# Patient Record
Sex: FEMALE | Race: WHITE | NOT HISPANIC OR LATINO | Employment: OTHER | URBAN - METROPOLITAN AREA
[De-identification: names, ages, dates, MRNs, and addresses within clinical notes are randomized per-mention and may not be internally consistent; named-entity substitution may affect disease eponyms.]

---

## 2019-08-16 ENCOUNTER — AMBULATORY - HEALTHEAST (OUTPATIENT)
Dept: ADDICTION MEDICINE | Facility: CLINIC | Age: 44
End: 2019-08-16

## 2019-08-16 ENCOUNTER — OFFICE VISIT - HEALTHEAST (OUTPATIENT)
Dept: ADDICTION MEDICINE | Facility: CLINIC | Age: 44
End: 2019-08-16

## 2019-08-16 DIAGNOSIS — F10.20 MODERATE ALCOHOL USE DISORDER (H): ICD-10-CM

## 2019-08-16 DIAGNOSIS — F12.20 CANNABIS USE DISORDER, SEVERE, DEPENDENCE (H): ICD-10-CM

## 2019-08-19 ENCOUNTER — OFFICE VISIT - HEALTHEAST (OUTPATIENT)
Dept: ADDICTION MEDICINE | Facility: CLINIC | Age: 44
End: 2019-08-19

## 2019-08-19 DIAGNOSIS — F12.20 CANNABIS USE DISORDER, SEVERE, DEPENDENCE (H): ICD-10-CM

## 2019-08-20 ENCOUNTER — AMBULATORY - HEALTHEAST (OUTPATIENT)
Dept: ADDICTION MEDICINE | Facility: CLINIC | Age: 44
End: 2019-08-20

## 2019-08-21 ENCOUNTER — OFFICE VISIT - HEALTHEAST (OUTPATIENT)
Dept: ADDICTION MEDICINE | Facility: CLINIC | Age: 44
End: 2019-08-21

## 2019-08-21 DIAGNOSIS — F12.20 CANNABIS USE DISORDER, SEVERE, DEPENDENCE (H): ICD-10-CM

## 2019-08-22 ENCOUNTER — AMBULATORY - HEALTHEAST (OUTPATIENT)
Dept: ADDICTION MEDICINE | Facility: CLINIC | Age: 44
End: 2019-08-22

## 2019-08-23 ENCOUNTER — OFFICE VISIT - HEALTHEAST (OUTPATIENT)
Dept: ADDICTION MEDICINE | Facility: CLINIC | Age: 44
End: 2019-08-23

## 2019-08-23 DIAGNOSIS — F12.20 CANNABIS USE DISORDER, SEVERE, DEPENDENCE (H): ICD-10-CM

## 2019-08-23 DIAGNOSIS — F10.20 MODERATE ALCOHOL USE DISORDER (H): ICD-10-CM

## 2019-08-26 ENCOUNTER — OFFICE VISIT - HEALTHEAST (OUTPATIENT)
Dept: ADDICTION MEDICINE | Facility: CLINIC | Age: 44
End: 2019-08-26

## 2019-08-26 DIAGNOSIS — F12.20 CANNABIS USE DISORDER, SEVERE, DEPENDENCE (H): ICD-10-CM

## 2019-08-28 ENCOUNTER — OFFICE VISIT - HEALTHEAST (OUTPATIENT)
Dept: BEHAVIORAL HEALTH | Facility: CLINIC | Age: 44
End: 2019-08-28

## 2019-08-28 ENCOUNTER — OFFICE VISIT - HEALTHEAST (OUTPATIENT)
Dept: ADDICTION MEDICINE | Facility: CLINIC | Age: 44
End: 2019-08-28

## 2019-08-28 DIAGNOSIS — F10.20 SEVERE ALCOHOL USE DISORDER (H): ICD-10-CM

## 2019-08-28 DIAGNOSIS — F17.200 TOBACCO USE DISORDER: ICD-10-CM

## 2019-08-28 DIAGNOSIS — F31.9 BIPOLAR I DISORDER (H): ICD-10-CM

## 2019-08-28 DIAGNOSIS — F12.20 CANNABIS USE DISORDER, SEVERE, DEPENDENCE (H): ICD-10-CM

## 2019-08-28 LAB
AMPHETAMINES UR QL SCN: ABNORMAL
BARBITURATES UR QL: ABNORMAL
BENZODIAZ UR QL: ABNORMAL
CANNABINOIDS UR QL SCN: ABNORMAL
COCAINE UR QL: ABNORMAL
CREAT UR-MCNC: 185.2 MG/DL
METHADONE UR QL SCN: ABNORMAL
OPIATES UR QL SCN: ABNORMAL
OXYCODONE UR QL: ABNORMAL
PCP UR QL SCN: ABNORMAL

## 2019-08-28 ASSESSMENT — MIFFLIN-ST. JEOR: SCORE: 1388.39

## 2019-08-30 ENCOUNTER — OFFICE VISIT - HEALTHEAST (OUTPATIENT)
Dept: ADDICTION MEDICINE | Facility: CLINIC | Age: 44
End: 2019-08-30

## 2019-08-30 DIAGNOSIS — F12.20 CANNABIS USE DISORDER, SEVERE, DEPENDENCE (H): ICD-10-CM

## 2019-09-01 LAB
ETHYL GLUCURONIDE UR CFM-MCNC: <100 NG/ML
ETHYL SULFATE UR CFM-MCNC: <100 NG/ML

## 2019-09-04 ENCOUNTER — OFFICE VISIT - HEALTHEAST (OUTPATIENT)
Dept: ADDICTION MEDICINE | Facility: CLINIC | Age: 44
End: 2019-09-04

## 2019-09-04 DIAGNOSIS — F12.20 CANNABIS USE DISORDER, SEVERE, DEPENDENCE (H): ICD-10-CM

## 2019-09-05 ENCOUNTER — AMBULATORY - HEALTHEAST (OUTPATIENT)
Dept: ADDICTION MEDICINE | Facility: CLINIC | Age: 44
End: 2019-09-05

## 2019-09-06 ENCOUNTER — OFFICE VISIT - HEALTHEAST (OUTPATIENT)
Dept: ADDICTION MEDICINE | Facility: CLINIC | Age: 44
End: 2019-09-06

## 2019-09-06 DIAGNOSIS — F12.20 CANNABIS USE DISORDER, SEVERE, DEPENDENCE (H): ICD-10-CM

## 2019-09-09 ENCOUNTER — OFFICE VISIT - HEALTHEAST (OUTPATIENT)
Dept: ADDICTION MEDICINE | Facility: CLINIC | Age: 44
End: 2019-09-09

## 2019-09-09 DIAGNOSIS — F12.20 CANNABIS USE DISORDER, SEVERE, DEPENDENCE (H): ICD-10-CM

## 2019-09-10 ENCOUNTER — OFFICE VISIT - HEALTHEAST (OUTPATIENT)
Dept: BEHAVIORAL HEALTH | Facility: CLINIC | Age: 44
End: 2019-09-10

## 2019-09-10 ENCOUNTER — AMBULATORY - HEALTHEAST (OUTPATIENT)
Dept: ADDICTION MEDICINE | Facility: CLINIC | Age: 44
End: 2019-09-10

## 2019-09-10 DIAGNOSIS — F10.20 SEVERE ALCOHOL USE DISORDER (H): ICD-10-CM

## 2019-09-10 DIAGNOSIS — F60.3 BORDERLINE PERSONALITY DISORDER (H): ICD-10-CM

## 2019-09-10 DIAGNOSIS — F33.1 DEPRESSION, MAJOR, RECURRENT, MODERATE (H): ICD-10-CM

## 2019-09-10 DIAGNOSIS — F12.20 CANNABIS USE DISORDER, SEVERE, DEPENDENCE (H): ICD-10-CM

## 2019-09-10 DIAGNOSIS — F31.9 BIPOLAR I DISORDER (H): ICD-10-CM

## 2019-09-10 DIAGNOSIS — F41.1 GENERALIZED ANXIETY DISORDER: ICD-10-CM

## 2019-09-11 ENCOUNTER — OFFICE VISIT - HEALTHEAST (OUTPATIENT)
Dept: ADDICTION MEDICINE | Facility: CLINIC | Age: 44
End: 2019-09-11

## 2019-09-11 DIAGNOSIS — F12.20 CANNABIS USE DISORDER, SEVERE, DEPENDENCE (H): ICD-10-CM

## 2019-09-12 ENCOUNTER — AMBULATORY - HEALTHEAST (OUTPATIENT)
Dept: ADDICTION MEDICINE | Facility: CLINIC | Age: 44
End: 2019-09-12

## 2019-09-13 ENCOUNTER — OFFICE VISIT - HEALTHEAST (OUTPATIENT)
Dept: ADDICTION MEDICINE | Facility: CLINIC | Age: 44
End: 2019-09-13

## 2019-09-13 DIAGNOSIS — F12.20 CANNABIS USE DISORDER, SEVERE, DEPENDENCE (H): ICD-10-CM

## 2019-09-16 ENCOUNTER — OFFICE VISIT - HEALTHEAST (OUTPATIENT)
Dept: ADDICTION MEDICINE | Facility: CLINIC | Age: 44
End: 2019-09-16

## 2019-09-16 DIAGNOSIS — F12.20 CANNABIS USE DISORDER, SEVERE, DEPENDENCE (H): ICD-10-CM

## 2019-09-18 ENCOUNTER — OFFICE VISIT - HEALTHEAST (OUTPATIENT)
Dept: ADDICTION MEDICINE | Facility: CLINIC | Age: 44
End: 2019-09-18

## 2019-09-18 DIAGNOSIS — F12.20 CANNABIS USE DISORDER, SEVERE, DEPENDENCE (H): ICD-10-CM

## 2019-09-19 ENCOUNTER — AMBULATORY - HEALTHEAST (OUTPATIENT)
Dept: ADDICTION MEDICINE | Facility: CLINIC | Age: 44
End: 2019-09-19

## 2019-09-23 ENCOUNTER — OFFICE VISIT - HEALTHEAST (OUTPATIENT)
Dept: ADDICTION MEDICINE | Facility: CLINIC | Age: 44
End: 2019-09-23

## 2019-09-23 DIAGNOSIS — F12.20 CANNABIS USE DISORDER, SEVERE, DEPENDENCE (H): ICD-10-CM

## 2019-09-24 ENCOUNTER — OFFICE VISIT - HEALTHEAST (OUTPATIENT)
Dept: BEHAVIORAL HEALTH | Facility: CLINIC | Age: 44
End: 2019-09-24

## 2019-09-24 DIAGNOSIS — F31.9 BIPOLAR I DISORDER (H): ICD-10-CM

## 2019-09-24 DIAGNOSIS — F60.3 BORDERLINE PERSONALITY DISORDER (H): ICD-10-CM

## 2019-09-24 DIAGNOSIS — F10.21 ALCOHOL DEPENDENCE IN EARLY, EARLY PARTIAL, SUSTAINED FULL, OR SUSTAINED PARTIAL REMISSION (H): ICD-10-CM

## 2019-09-24 DIAGNOSIS — F12.20 CANNABIS USE DISORDER, SEVERE, DEPENDENCE (H): ICD-10-CM

## 2019-09-24 DIAGNOSIS — F41.1 GENERALIZED ANXIETY DISORDER: ICD-10-CM

## 2019-09-25 ENCOUNTER — OFFICE VISIT - HEALTHEAST (OUTPATIENT)
Dept: ADDICTION MEDICINE | Facility: CLINIC | Age: 44
End: 2019-09-25

## 2019-09-25 ENCOUNTER — OFFICE VISIT - HEALTHEAST (OUTPATIENT)
Dept: BEHAVIORAL HEALTH | Facility: CLINIC | Age: 44
End: 2019-09-25

## 2019-09-25 DIAGNOSIS — F41.1 GENERALIZED ANXIETY DISORDER: ICD-10-CM

## 2019-09-25 DIAGNOSIS — F12.20 CANNABIS USE DISORDER, SEVERE, DEPENDENCE (H): ICD-10-CM

## 2019-09-25 DIAGNOSIS — F60.3 BORDERLINE PERSONALITY DISORDER (H): ICD-10-CM

## 2019-09-25 DIAGNOSIS — F31.32 BIPOLAR AFFECTIVE DISORDER, CURRENTLY DEPRESSED, MODERATE (H): ICD-10-CM

## 2019-09-25 DIAGNOSIS — F10.20 SEVERE ALCOHOL USE DISORDER (H): ICD-10-CM

## 2019-09-25 LAB
AMPHETAMINES UR QL SCN: ABNORMAL
BARBITURATES UR QL: ABNORMAL
BENZODIAZ UR QL: ABNORMAL
CANNABINOIDS UR QL SCN: ABNORMAL
COCAINE UR QL: ABNORMAL
CREAT UR-MCNC: 214.9 MG/DL
METHADONE UR QL SCN: ABNORMAL
OPIATES UR QL SCN: ABNORMAL
OXYCODONE UR QL: ABNORMAL
PCP UR QL SCN: ABNORMAL

## 2019-09-25 RX ORDER — TRAZODONE HYDROCHLORIDE 50 MG/1
50 TABLET, FILM COATED ORAL
Qty: 30 TABLET | Refills: 0 | Status: SHIPPED | OUTPATIENT
Start: 2019-09-25

## 2019-09-25 RX ORDER — LANOLIN ALCOHOL/MO/W.PET/CERES
9 CREAM (GRAM) TOPICAL AT BEDTIME
Qty: 90 TABLET | Refills: 2 | Status: SHIPPED | OUTPATIENT
Start: 2019-09-25

## 2019-09-25 ASSESSMENT — ANXIETY QUESTIONNAIRES
5. BEING SO RESTLESS THAT IT IS HARD TO SIT STILL: NEARLY EVERY DAY
3. WORRYING TOO MUCH ABOUT DIFFERENT THINGS: NEARLY EVERY DAY
1. FEELING NERVOUS, ANXIOUS, OR ON EDGE: NEARLY EVERY DAY
7. FEELING AFRAID AS IF SOMETHING AWFUL MIGHT HAPPEN: NEARLY EVERY DAY
4. TROUBLE RELAXING: NEARLY EVERY DAY
2. NOT BEING ABLE TO STOP OR CONTROL WORRYING: NEARLY EVERY DAY
GAD7 TOTAL SCORE: 21
IF YOU CHECKED OFF ANY PROBLEMS ON THIS QUESTIONNAIRE, HOW DIFFICULT HAVE THESE PROBLEMS MADE IT FOR YOU TO DO YOUR WORK, TAKE CARE OF THINGS AT HOME, OR GET ALONG WITH OTHER PEOPLE: EXTREMELY DIFFICULT
6. BECOMING EASILY ANNOYED OR IRRITABLE: NEARLY EVERY DAY

## 2019-09-25 ASSESSMENT — MIFFLIN-ST. JEOR: SCORE: 1388.39

## 2019-09-25 ASSESSMENT — PATIENT HEALTH QUESTIONNAIRE - PHQ9: SUM OF ALL RESPONSES TO PHQ QUESTIONS 1-9: 18

## 2019-09-26 ENCOUNTER — AMBULATORY - HEALTHEAST (OUTPATIENT)
Dept: ADDICTION MEDICINE | Facility: CLINIC | Age: 44
End: 2019-09-26

## 2019-09-27 ENCOUNTER — OFFICE VISIT - HEALTHEAST (OUTPATIENT)
Dept: ADDICTION MEDICINE | Facility: CLINIC | Age: 44
End: 2019-09-27

## 2019-09-27 DIAGNOSIS — F12.20 CANNABIS USE DISORDER, SEVERE, DEPENDENCE (H): ICD-10-CM

## 2019-09-28 LAB
ETHYL GLUCURONIDE UR CFM-MCNC: <100 NG/ML
ETHYL SULFATE UR CFM-MCNC: <100 NG/ML

## 2019-09-30 ENCOUNTER — OFFICE VISIT - HEALTHEAST (OUTPATIENT)
Dept: ADDICTION MEDICINE | Facility: CLINIC | Age: 44
End: 2019-09-30

## 2019-09-30 DIAGNOSIS — F12.20 CANNABIS USE DISORDER, SEVERE, DEPENDENCE (H): ICD-10-CM

## 2019-10-02 ENCOUNTER — OFFICE VISIT - HEALTHEAST (OUTPATIENT)
Dept: ADDICTION MEDICINE | Facility: CLINIC | Age: 44
End: 2019-10-02

## 2019-10-02 DIAGNOSIS — F12.20 CANNABIS USE DISORDER, SEVERE, DEPENDENCE (H): ICD-10-CM

## 2019-10-04 ENCOUNTER — OFFICE VISIT - HEALTHEAST (OUTPATIENT)
Dept: ADDICTION MEDICINE | Facility: CLINIC | Age: 44
End: 2019-10-04

## 2019-10-04 ENCOUNTER — AMBULATORY - HEALTHEAST (OUTPATIENT)
Dept: ADDICTION MEDICINE | Facility: CLINIC | Age: 44
End: 2019-10-04

## 2019-10-04 DIAGNOSIS — F12.20 CANNABIS USE DISORDER, SEVERE, DEPENDENCE (H): ICD-10-CM

## 2019-10-07 ENCOUNTER — AMBULATORY - HEALTHEAST (OUTPATIENT)
Dept: LAB | Facility: CLINIC | Age: 44
End: 2019-10-07

## 2019-10-07 ENCOUNTER — OFFICE VISIT - HEALTHEAST (OUTPATIENT)
Dept: ADDICTION MEDICINE | Facility: CLINIC | Age: 44
End: 2019-10-07

## 2019-10-07 ENCOUNTER — COMMUNICATION - HEALTHEAST (OUTPATIENT)
Dept: TELEHEALTH | Facility: CLINIC | Age: 44
End: 2019-10-07

## 2019-10-07 DIAGNOSIS — F10.20 MODERATE ALCOHOL USE DISORDER (H): ICD-10-CM

## 2019-10-07 DIAGNOSIS — F12.20 CANNABIS USE DISORDER, SEVERE, DEPENDENCE (H): ICD-10-CM

## 2019-10-07 DIAGNOSIS — F10.20 SEVERE ALCOHOL USE DISORDER (H): ICD-10-CM

## 2019-10-07 LAB
AMPHETAMINES UR QL SCN: NORMAL
BARBITURATES UR QL: NORMAL
BENZODIAZ UR QL: NORMAL
CANNABINOIDS UR QL SCN: NORMAL
COCAINE UR QL: NORMAL
CREAT UR-MCNC: 110.3 MG/DL
ETHANOL UR CFM-MCNC: <10 MG/DL
METHADONE UR QL SCN: NORMAL
OPIATES UR QL SCN: NORMAL
OXYCODONE UR QL: NORMAL
PCP UR QL SCN: NORMAL

## 2019-10-08 ENCOUNTER — AMBULATORY - HEALTHEAST (OUTPATIENT)
Dept: BEHAVIORAL HEALTH | Facility: CLINIC | Age: 44
End: 2019-10-08

## 2019-10-08 ENCOUNTER — OFFICE VISIT - HEALTHEAST (OUTPATIENT)
Dept: BEHAVIORAL HEALTH | Facility: CLINIC | Age: 44
End: 2019-10-08

## 2019-10-08 DIAGNOSIS — F10.21 ALCOHOL DEPENDENCE IN EARLY, EARLY PARTIAL, SUSTAINED FULL, OR SUSTAINED PARTIAL REMISSION (H): ICD-10-CM

## 2019-10-08 DIAGNOSIS — F41.1 GENERALIZED ANXIETY DISORDER: ICD-10-CM

## 2019-10-08 DIAGNOSIS — F60.3 BORDERLINE PERSONALITY DISORDER (H): ICD-10-CM

## 2019-10-08 DIAGNOSIS — F12.21 MODERATE TETRAHYDROCANNABINOL (THC) DEPENDENCE IN EARLY REMISSION (H): ICD-10-CM

## 2019-10-08 DIAGNOSIS — F31.32 BIPOLAR AFFECTIVE DISORDER, CURRENTLY DEPRESSED, MODERATE (H): ICD-10-CM

## 2019-10-09 ENCOUNTER — OFFICE VISIT - HEALTHEAST (OUTPATIENT)
Dept: ADDICTION MEDICINE | Facility: CLINIC | Age: 44
End: 2019-10-09

## 2019-10-09 DIAGNOSIS — F12.20 CANNABIS USE DISORDER, SEVERE, DEPENDENCE (H): ICD-10-CM

## 2019-10-09 LAB
ETHYL GLUCURONIDE UR CFM-MCNC: <100 NG/ML
ETHYL SULFATE UR CFM-MCNC: <100 NG/ML

## 2019-10-10 ENCOUNTER — AMBULATORY - HEALTHEAST (OUTPATIENT)
Dept: ADDICTION MEDICINE | Facility: CLINIC | Age: 44
End: 2019-10-10

## 2019-10-11 ENCOUNTER — OFFICE VISIT - HEALTHEAST (OUTPATIENT)
Dept: ADDICTION MEDICINE | Facility: CLINIC | Age: 44
End: 2019-10-11

## 2019-10-11 DIAGNOSIS — F12.20 CANNABIS USE DISORDER, SEVERE, DEPENDENCE (H): ICD-10-CM

## 2019-10-14 ENCOUNTER — OFFICE VISIT - HEALTHEAST (OUTPATIENT)
Dept: ADDICTION MEDICINE | Facility: CLINIC | Age: 44
End: 2019-10-14

## 2019-10-14 DIAGNOSIS — F12.20 CANNABIS USE DISORDER, SEVERE, DEPENDENCE (H): ICD-10-CM

## 2019-10-17 ENCOUNTER — AMBULATORY - HEALTHEAST (OUTPATIENT)
Dept: ADDICTION MEDICINE | Facility: CLINIC | Age: 44
End: 2019-10-17

## 2019-10-18 ENCOUNTER — OFFICE VISIT - HEALTHEAST (OUTPATIENT)
Dept: ADDICTION MEDICINE | Facility: CLINIC | Age: 44
End: 2019-10-18

## 2019-10-18 DIAGNOSIS — F12.20 CANNABIS USE DISORDER, SEVERE, DEPENDENCE (H): ICD-10-CM

## 2019-10-22 ENCOUNTER — OFFICE VISIT - HEALTHEAST (OUTPATIENT)
Dept: BEHAVIORAL HEALTH | Facility: CLINIC | Age: 44
End: 2019-10-22

## 2019-10-22 DIAGNOSIS — F41.1 GENERALIZED ANXIETY DISORDER: ICD-10-CM

## 2019-10-22 DIAGNOSIS — F12.21 MODERATE TETRAHYDROCANNABINOL (THC) DEPENDENCE IN EARLY REMISSION (H): ICD-10-CM

## 2019-10-22 DIAGNOSIS — F31.32 BIPOLAR AFFECTIVE DISORDER, CURRENTLY DEPRESSED, MODERATE (H): ICD-10-CM

## 2019-10-22 DIAGNOSIS — F10.21 ALCOHOL DEPENDENCE IN EARLY, EARLY PARTIAL, SUSTAINED FULL, OR SUSTAINED PARTIAL REMISSION (H): ICD-10-CM

## 2019-10-22 DIAGNOSIS — F60.3 BORDERLINE PERSONALITY DISORDER (H): ICD-10-CM

## 2019-10-23 ENCOUNTER — OFFICE VISIT - HEALTHEAST (OUTPATIENT)
Dept: ADDICTION MEDICINE | Facility: CLINIC | Age: 44
End: 2019-10-23

## 2019-10-23 DIAGNOSIS — F12.20 CANNABIS USE DISORDER, SEVERE, DEPENDENCE (H): ICD-10-CM

## 2019-10-24 ENCOUNTER — AMBULATORY - HEALTHEAST (OUTPATIENT)
Dept: ADDICTION MEDICINE | Facility: CLINIC | Age: 44
End: 2019-10-24

## 2019-10-25 ENCOUNTER — OFFICE VISIT - HEALTHEAST (OUTPATIENT)
Dept: ADDICTION MEDICINE | Facility: CLINIC | Age: 44
End: 2019-10-25

## 2019-10-25 DIAGNOSIS — F12.20 CANNABIS USE DISORDER, SEVERE, DEPENDENCE (H): ICD-10-CM

## 2019-10-30 ENCOUNTER — OFFICE VISIT - HEALTHEAST (OUTPATIENT)
Dept: ADDICTION MEDICINE | Facility: CLINIC | Age: 44
End: 2019-10-30

## 2019-10-30 DIAGNOSIS — F12.20 CANNABIS USE DISORDER, SEVERE, DEPENDENCE (H): ICD-10-CM

## 2019-10-31 ENCOUNTER — AMBULATORY - HEALTHEAST (OUTPATIENT)
Dept: ADDICTION MEDICINE | Facility: CLINIC | Age: 44
End: 2019-10-31

## 2019-11-05 ENCOUNTER — AMBULATORY - HEALTHEAST (OUTPATIENT)
Dept: BEHAVIORAL HEALTH | Facility: CLINIC | Age: 44
End: 2019-11-05

## 2019-11-07 ENCOUNTER — AMBULATORY - HEALTHEAST (OUTPATIENT)
Dept: ADDICTION MEDICINE | Facility: CLINIC | Age: 44
End: 2019-11-07

## 2019-11-08 ENCOUNTER — COMMUNICATION - HEALTHEAST (OUTPATIENT)
Dept: ADDICTION MEDICINE | Facility: CLINIC | Age: 44
End: 2019-11-08

## 2019-11-11 ENCOUNTER — OFFICE VISIT - HEALTHEAST (OUTPATIENT)
Dept: ADDICTION MEDICINE | Facility: CLINIC | Age: 44
End: 2019-11-11

## 2019-11-11 DIAGNOSIS — F12.20 CANNABIS USE DISORDER, SEVERE, DEPENDENCE (H): ICD-10-CM

## 2019-11-13 ENCOUNTER — OFFICE VISIT - HEALTHEAST (OUTPATIENT)
Dept: ADDICTION MEDICINE | Facility: CLINIC | Age: 44
End: 2019-11-13

## 2019-11-13 DIAGNOSIS — F12.20 CANNABIS USE DISORDER, SEVERE, DEPENDENCE (H): ICD-10-CM

## 2019-11-14 ENCOUNTER — OFFICE VISIT - HEALTHEAST (OUTPATIENT)
Dept: ADDICTION MEDICINE | Facility: CLINIC | Age: 44
End: 2019-11-14

## 2019-11-14 ENCOUNTER — AMBULATORY - HEALTHEAST (OUTPATIENT)
Dept: ADDICTION MEDICINE | Facility: CLINIC | Age: 44
End: 2019-11-14

## 2019-11-14 DIAGNOSIS — F12.20 CANNABIS USE DISORDER, SEVERE, DEPENDENCE (H): ICD-10-CM

## 2019-11-15 ENCOUNTER — OFFICE VISIT - HEALTHEAST (OUTPATIENT)
Dept: ADDICTION MEDICINE | Facility: CLINIC | Age: 44
End: 2019-11-15

## 2019-11-15 DIAGNOSIS — F12.20 CANNABIS USE DISORDER, SEVERE, DEPENDENCE (H): ICD-10-CM

## 2019-11-18 ENCOUNTER — OFFICE VISIT - HEALTHEAST (OUTPATIENT)
Dept: ADDICTION MEDICINE | Facility: CLINIC | Age: 44
End: 2019-11-18

## 2019-11-18 DIAGNOSIS — F12.20 CANNABIS USE DISORDER, SEVERE, DEPENDENCE (H): ICD-10-CM

## 2019-11-19 ENCOUNTER — OFFICE VISIT - HEALTHEAST (OUTPATIENT)
Dept: BEHAVIORAL HEALTH | Facility: CLINIC | Age: 44
End: 2019-11-19

## 2019-11-19 DIAGNOSIS — F31.32 BIPOLAR AFFECTIVE DISORDER, CURRENTLY DEPRESSED, MODERATE (H): ICD-10-CM

## 2019-11-19 DIAGNOSIS — F10.21 ALCOHOL DEPENDENCE IN EARLY, EARLY PARTIAL, SUSTAINED FULL, OR SUSTAINED PARTIAL REMISSION (H): ICD-10-CM

## 2019-11-19 DIAGNOSIS — F12.21 MODERATE TETRAHYDROCANNABINOL (THC) DEPENDENCE IN EARLY REMISSION (H): ICD-10-CM

## 2019-11-19 DIAGNOSIS — F41.1 GENERALIZED ANXIETY DISORDER: ICD-10-CM

## 2019-11-19 DIAGNOSIS — F60.3 BORDERLINE PERSONALITY DISORDER (H): ICD-10-CM

## 2019-11-21 ENCOUNTER — AMBULATORY - HEALTHEAST (OUTPATIENT)
Dept: ADDICTION MEDICINE | Facility: CLINIC | Age: 44
End: 2019-11-21

## 2019-11-22 ENCOUNTER — OFFICE VISIT - HEALTHEAST (OUTPATIENT)
Dept: ADDICTION MEDICINE | Facility: CLINIC | Age: 44
End: 2019-11-22

## 2019-11-22 DIAGNOSIS — F12.20 CANNABIS USE DISORDER, SEVERE, DEPENDENCE (H): ICD-10-CM

## 2019-11-25 ENCOUNTER — OFFICE VISIT - HEALTHEAST (OUTPATIENT)
Dept: ADDICTION MEDICINE | Facility: CLINIC | Age: 44
End: 2019-11-25

## 2019-11-25 DIAGNOSIS — F12.20 CANNABIS USE DISORDER, SEVERE, DEPENDENCE (H): ICD-10-CM

## 2019-11-27 ENCOUNTER — AMBULATORY - HEALTHEAST (OUTPATIENT)
Dept: ADDICTION MEDICINE | Facility: CLINIC | Age: 44
End: 2019-11-27

## 2019-12-04 ENCOUNTER — OFFICE VISIT - HEALTHEAST (OUTPATIENT)
Dept: ADDICTION MEDICINE | Facility: CLINIC | Age: 44
End: 2019-12-04

## 2019-12-04 DIAGNOSIS — F12.20 CANNABIS USE DISORDER, SEVERE, DEPENDENCE (H): ICD-10-CM

## 2019-12-05 ENCOUNTER — AMBULATORY - HEALTHEAST (OUTPATIENT)
Dept: ADDICTION MEDICINE | Facility: CLINIC | Age: 44
End: 2019-12-05

## 2019-12-06 ENCOUNTER — OFFICE VISIT - HEALTHEAST (OUTPATIENT)
Dept: ADDICTION MEDICINE | Facility: CLINIC | Age: 44
End: 2019-12-06

## 2019-12-06 DIAGNOSIS — F12.20 CANNABIS USE DISORDER, SEVERE, DEPENDENCE (H): ICD-10-CM

## 2019-12-09 ENCOUNTER — OFFICE VISIT - HEALTHEAST (OUTPATIENT)
Dept: ADDICTION MEDICINE | Facility: CLINIC | Age: 44
End: 2019-12-09

## 2019-12-09 DIAGNOSIS — F12.20 CANNABIS USE DISORDER, SEVERE, DEPENDENCE (H): ICD-10-CM

## 2019-12-11 ENCOUNTER — OFFICE VISIT - HEALTHEAST (OUTPATIENT)
Dept: ADDICTION MEDICINE | Facility: CLINIC | Age: 44
End: 2019-12-11

## 2019-12-11 DIAGNOSIS — F12.20 CANNABIS USE DISORDER, SEVERE, DEPENDENCE (H): ICD-10-CM

## 2019-12-12 ENCOUNTER — AMBULATORY - HEALTHEAST (OUTPATIENT)
Dept: ADDICTION MEDICINE | Facility: CLINIC | Age: 44
End: 2019-12-12

## 2019-12-16 ENCOUNTER — AMBULATORY - HEALTHEAST (OUTPATIENT)
Dept: ADDICTION MEDICINE | Facility: CLINIC | Age: 44
End: 2019-12-16

## 2019-12-17 ENCOUNTER — OFFICE VISIT - HEALTHEAST (OUTPATIENT)
Dept: BEHAVIORAL HEALTH | Facility: CLINIC | Age: 44
End: 2019-12-17

## 2019-12-17 DIAGNOSIS — F60.3 BORDERLINE PERSONALITY DISORDER (H): ICD-10-CM

## 2019-12-17 DIAGNOSIS — F31.32 BIPOLAR AFFECTIVE DISORDER, CURRENTLY DEPRESSED, MODERATE (H): ICD-10-CM

## 2019-12-17 DIAGNOSIS — F10.21 ALCOHOL DEPENDENCE IN EARLY, EARLY PARTIAL, SUSTAINED FULL, OR SUSTAINED PARTIAL REMISSION (H): ICD-10-CM

## 2019-12-17 DIAGNOSIS — F41.1 GENERALIZED ANXIETY DISORDER: ICD-10-CM

## 2019-12-18 ENCOUNTER — OFFICE VISIT - HEALTHEAST (OUTPATIENT)
Dept: BEHAVIORAL HEALTH | Facility: CLINIC | Age: 44
End: 2019-12-18

## 2019-12-18 DIAGNOSIS — F12.20 CANNABIS USE DISORDER, SEVERE, DEPENDENCE (H): ICD-10-CM

## 2019-12-18 DIAGNOSIS — F10.20 SEVERE ALCOHOL USE DISORDER (H): ICD-10-CM

## 2019-12-18 DIAGNOSIS — F60.3 BORDERLINE PERSONALITY DISORDER (H): ICD-10-CM

## 2019-12-18 DIAGNOSIS — F31.32 BIPOLAR AFFECTIVE DISORDER, CURRENTLY DEPRESSED, MODERATE (H): ICD-10-CM

## 2019-12-18 DIAGNOSIS — F41.1 GENERALIZED ANXIETY DISORDER: ICD-10-CM

## 2019-12-18 LAB
AMPHETAMINES UR QL SCN: NORMAL
BARBITURATES UR QL: NORMAL
BENZODIAZ UR QL: NORMAL
CANNABINOIDS UR QL SCN: NORMAL
COCAINE UR QL: NORMAL
CREAT UR-MCNC: 85.8 MG/DL
METHADONE UR QL SCN: NORMAL
OPIATES UR QL SCN: NORMAL
OXYCODONE UR QL: NORMAL
PCP UR QL SCN: NORMAL

## 2019-12-18 RX ORDER — GABAPENTIN 300 MG/1
300 CAPSULE ORAL 3 TIMES DAILY
Qty: 90 CAPSULE | Refills: 2 | Status: SHIPPED | OUTPATIENT
Start: 2019-12-18

## 2019-12-18 RX ORDER — DIVALPROEX SODIUM 250 MG/1
250 TABLET, EXTENDED RELEASE ORAL
Refills: 1 | Status: SHIPPED | COMMUNITY
Start: 2019-11-20

## 2019-12-18 RX ORDER — RISPERIDONE 2 MG/1
2 TABLET ORAL 3 TIMES DAILY
Status: SHIPPED | COMMUNITY
Start: 2019-11-09

## 2019-12-18 RX ORDER — NALTREXONE HYDROCHLORIDE 50 MG/1
50 TABLET, FILM COATED ORAL DAILY
Qty: 30 TABLET | Refills: 2 | Status: SHIPPED | OUTPATIENT
Start: 2019-12-18

## 2019-12-18 ASSESSMENT — PATIENT HEALTH QUESTIONNAIRE - PHQ9: SUM OF ALL RESPONSES TO PHQ QUESTIONS 1-9: 14

## 2019-12-18 ASSESSMENT — ANXIETY QUESTIONNAIRES
4. TROUBLE RELAXING: NEARLY EVERY DAY
GAD7 TOTAL SCORE: 18
5. BEING SO RESTLESS THAT IT IS HARD TO SIT STILL: SEVERAL DAYS
3. WORRYING TOO MUCH ABOUT DIFFERENT THINGS: NEARLY EVERY DAY
1. FEELING NERVOUS, ANXIOUS, OR ON EDGE: NEARLY EVERY DAY
6. BECOMING EASILY ANNOYED OR IRRITABLE: MORE THAN HALF THE DAYS
IF YOU CHECKED OFF ANY PROBLEMS ON THIS QUESTIONNAIRE, HOW DIFFICULT HAVE THESE PROBLEMS MADE IT FOR YOU TO DO YOUR WORK, TAKE CARE OF THINGS AT HOME, OR GET ALONG WITH OTHER PEOPLE: VERY DIFFICULT
7. FEELING AFRAID AS IF SOMETHING AWFUL MIGHT HAPPEN: NEARLY EVERY DAY
2. NOT BEING ABLE TO STOP OR CONTROL WORRYING: NEARLY EVERY DAY

## 2019-12-20 LAB
ETHYL GLUCURONIDE UR CFM-MCNC: <100 NG/ML
ETHYL SULFATE UR CFM-MCNC: <100 NG/ML

## 2020-01-14 ENCOUNTER — OFFICE VISIT - HEALTHEAST (OUTPATIENT)
Dept: BEHAVIORAL HEALTH | Facility: CLINIC | Age: 45
End: 2020-01-14

## 2020-01-14 DIAGNOSIS — F12.21 MODERATE TETRAHYDROCANNABINOL (THC) DEPENDENCE IN EARLY REMISSION (H): ICD-10-CM

## 2020-01-14 DIAGNOSIS — F60.3 BORDERLINE PERSONALITY DISORDER (H): ICD-10-CM

## 2020-01-14 DIAGNOSIS — F10.21 ALCOHOL DEPENDENCE IN EARLY, EARLY PARTIAL, SUSTAINED FULL, OR SUSTAINED PARTIAL REMISSION (H): ICD-10-CM

## 2020-01-14 DIAGNOSIS — F41.1 GENERALIZED ANXIETY DISORDER: ICD-10-CM

## 2020-01-14 DIAGNOSIS — F31.32 BIPOLAR AFFECTIVE DISORDER, CURRENTLY DEPRESSED, MODERATE (H): ICD-10-CM

## 2020-01-21 ENCOUNTER — OFFICE VISIT - HEALTHEAST (OUTPATIENT)
Dept: BEHAVIORAL HEALTH | Facility: CLINIC | Age: 45
End: 2020-01-21

## 2020-01-21 DIAGNOSIS — F10.21 ALCOHOL DEPENDENCE IN EARLY, EARLY PARTIAL, SUSTAINED FULL, OR SUSTAINED PARTIAL REMISSION (H): ICD-10-CM

## 2020-01-21 DIAGNOSIS — F12.21 MODERATE TETRAHYDROCANNABINOL (THC) DEPENDENCE IN EARLY REMISSION (H): ICD-10-CM

## 2020-01-21 DIAGNOSIS — F60.3 BORDERLINE PERSONALITY DISORDER (H): ICD-10-CM

## 2020-01-21 DIAGNOSIS — F31.32 BIPOLAR AFFECTIVE DISORDER, CURRENTLY DEPRESSED, MODERATE (H): ICD-10-CM

## 2020-01-21 DIAGNOSIS — F41.1 GENERALIZED ANXIETY DISORDER: ICD-10-CM

## 2020-02-04 ENCOUNTER — COMMUNICATION - HEALTHEAST (OUTPATIENT)
Dept: BEHAVIORAL HEALTH | Facility: CLINIC | Age: 45
End: 2020-02-04

## 2020-02-18 ENCOUNTER — AMBULATORY - HEALTHEAST (OUTPATIENT)
Dept: BEHAVIORAL HEALTH | Facility: CLINIC | Age: 45
End: 2020-02-18

## 2020-02-18 ENCOUNTER — OFFICE VISIT - HEALTHEAST (OUTPATIENT)
Dept: BEHAVIORAL HEALTH | Facility: CLINIC | Age: 45
End: 2020-02-18

## 2020-02-18 DIAGNOSIS — F60.3 BORDERLINE PERSONALITY DISORDER (H): ICD-10-CM

## 2020-02-18 DIAGNOSIS — F10.21 ALCOHOL DEPENDENCE IN EARLY, EARLY PARTIAL, SUSTAINED FULL, OR SUSTAINED PARTIAL REMISSION (H): ICD-10-CM

## 2020-02-18 DIAGNOSIS — F31.32 BIPOLAR AFFECTIVE DISORDER, CURRENTLY DEPRESSED, MODERATE (H): ICD-10-CM

## 2020-02-18 DIAGNOSIS — F41.1 GENERALIZED ANXIETY DISORDER: ICD-10-CM

## 2020-02-18 DIAGNOSIS — F12.21 MODERATE TETRAHYDROCANNABINOL (THC) DEPENDENCE IN EARLY REMISSION (H): ICD-10-CM

## 2020-02-18 ASSESSMENT — ANXIETY QUESTIONNAIRES
4. TROUBLE RELAXING: NEARLY EVERY DAY
IF YOU CHECKED OFF ANY PROBLEMS ON THIS QUESTIONNAIRE, HOW DIFFICULT HAVE THESE PROBLEMS MADE IT FOR YOU TO DO YOUR WORK, TAKE CARE OF THINGS AT HOME, OR GET ALONG WITH OTHER PEOPLE: EXTREMELY DIFFICULT
1. FEELING NERVOUS, ANXIOUS, OR ON EDGE: NEARLY EVERY DAY
3. WORRYING TOO MUCH ABOUT DIFFERENT THINGS: NEARLY EVERY DAY
7. FEELING AFRAID AS IF SOMETHING AWFUL MIGHT HAPPEN: NEARLY EVERY DAY
6. BECOMING EASILY ANNOYED OR IRRITABLE: NEARLY EVERY DAY
GAD7 TOTAL SCORE: 21
2. NOT BEING ABLE TO STOP OR CONTROL WORRYING: NEARLY EVERY DAY
5. BEING SO RESTLESS THAT IT IS HARD TO SIT STILL: NEARLY EVERY DAY

## 2020-02-18 ASSESSMENT — PATIENT HEALTH QUESTIONNAIRE - PHQ9: SUM OF ALL RESPONSES TO PHQ QUESTIONS 1-9: 17

## 2020-03-03 ENCOUNTER — OFFICE VISIT - HEALTHEAST (OUTPATIENT)
Dept: BEHAVIORAL HEALTH | Facility: CLINIC | Age: 45
End: 2020-03-03

## 2020-03-03 DIAGNOSIS — F12.21 MODERATE TETRAHYDROCANNABINOL (THC) DEPENDENCE IN EARLY REMISSION (H): ICD-10-CM

## 2020-03-03 DIAGNOSIS — F31.32 BIPOLAR AFFECTIVE DISORDER, CURRENTLY DEPRESSED, MODERATE (H): ICD-10-CM

## 2020-03-03 DIAGNOSIS — F60.3 BORDERLINE PERSONALITY DISORDER (H): ICD-10-CM

## 2020-03-03 DIAGNOSIS — F41.1 GENERALIZED ANXIETY DISORDER: ICD-10-CM

## 2020-03-03 DIAGNOSIS — F10.21 ALCOHOL DEPENDENCE IN EARLY, EARLY PARTIAL, SUSTAINED FULL, OR SUSTAINED PARTIAL REMISSION (H): ICD-10-CM

## 2020-03-05 ASSESSMENT — ANXIETY QUESTIONNAIRES
2. NOT BEING ABLE TO STOP OR CONTROL WORRYING: NEARLY EVERY DAY
3. WORRYING TOO MUCH ABOUT DIFFERENT THINGS: NEARLY EVERY DAY
1. FEELING NERVOUS, ANXIOUS, OR ON EDGE: NEARLY EVERY DAY
5. BEING SO RESTLESS THAT IT IS HARD TO SIT STILL: NEARLY EVERY DAY
6. BECOMING EASILY ANNOYED OR IRRITABLE: NEARLY EVERY DAY
4. TROUBLE RELAXING: NEARLY EVERY DAY
7. FEELING AFRAID AS IF SOMETHING AWFUL MIGHT HAPPEN: NEARLY EVERY DAY
IF YOU CHECKED OFF ANY PROBLEMS ON THIS QUESTIONNAIRE, HOW DIFFICULT HAVE THESE PROBLEMS MADE IT FOR YOU TO DO YOUR WORK, TAKE CARE OF THINGS AT HOME, OR GET ALONG WITH OTHER PEOPLE: EXTREMELY DIFFICULT
GAD7 TOTAL SCORE: 21

## 2020-03-05 ASSESSMENT — PATIENT HEALTH QUESTIONNAIRE - PHQ9: SUM OF ALL RESPONSES TO PHQ QUESTIONS 1-9: 17

## 2020-03-16 ENCOUNTER — AMBULATORY - HEALTHEAST (OUTPATIENT)
Dept: BEHAVIORAL HEALTH | Facility: CLINIC | Age: 45
End: 2020-03-16

## 2020-03-17 ENCOUNTER — OFFICE VISIT - HEALTHEAST (OUTPATIENT)
Dept: BEHAVIORAL HEALTH | Facility: CLINIC | Age: 45
End: 2020-03-17

## 2020-03-17 DIAGNOSIS — F31.32 BIPOLAR AFFECTIVE DISORDER, CURRENTLY DEPRESSED, MODERATE (H): ICD-10-CM

## 2020-03-17 DIAGNOSIS — F12.21 MODERATE TETRAHYDROCANNABINOL (THC) DEPENDENCE IN EARLY REMISSION (H): ICD-10-CM

## 2020-03-17 DIAGNOSIS — F60.3 BORDERLINE PERSONALITY DISORDER (H): ICD-10-CM

## 2020-03-17 DIAGNOSIS — F10.21 ALCOHOL DEPENDENCE IN EARLY, EARLY PARTIAL, SUSTAINED FULL, OR SUSTAINED PARTIAL REMISSION (H): ICD-10-CM

## 2020-03-17 DIAGNOSIS — F41.1 GENERALIZED ANXIETY DISORDER: ICD-10-CM

## 2020-03-26 ENCOUNTER — OFFICE VISIT - HEALTHEAST (OUTPATIENT)
Dept: BEHAVIORAL HEALTH | Facility: CLINIC | Age: 45
End: 2020-03-26

## 2020-03-26 DIAGNOSIS — F41.1 GENERALIZED ANXIETY DISORDER: ICD-10-CM

## 2020-03-26 DIAGNOSIS — F60.3 BORDERLINE PERSONALITY DISORDER (H): ICD-10-CM

## 2020-03-26 DIAGNOSIS — F31.32 BIPOLAR AFFECTIVE DISORDER, CURRENTLY DEPRESSED, MODERATE (H): ICD-10-CM

## 2020-03-26 DIAGNOSIS — F10.10 ALCOHOL ABUSE: ICD-10-CM

## 2020-03-31 ENCOUNTER — OFFICE VISIT - HEALTHEAST (OUTPATIENT)
Dept: BEHAVIORAL HEALTH | Facility: CLINIC | Age: 45
End: 2020-03-31

## 2020-03-31 DIAGNOSIS — F31.32 BIPOLAR AFFECTIVE DISORDER, CURRENTLY DEPRESSED, MODERATE (H): ICD-10-CM

## 2020-03-31 DIAGNOSIS — F41.1 GENERALIZED ANXIETY DISORDER: ICD-10-CM

## 2020-03-31 DIAGNOSIS — F10.10 ALCOHOL ABUSE: ICD-10-CM

## 2020-03-31 DIAGNOSIS — F12.21 MODERATE TETRAHYDROCANNABINOL (THC) DEPENDENCE IN EARLY REMISSION (H): ICD-10-CM

## 2020-03-31 DIAGNOSIS — F60.3 BORDERLINE PERSONALITY DISORDER (H): ICD-10-CM

## 2020-04-14 ENCOUNTER — OFFICE VISIT - HEALTHEAST (OUTPATIENT)
Dept: BEHAVIORAL HEALTH | Facility: CLINIC | Age: 45
End: 2020-04-14

## 2020-04-14 DIAGNOSIS — F12.21 MODERATE TETRAHYDROCANNABINOL (THC) DEPENDENCE IN EARLY REMISSION (H): ICD-10-CM

## 2020-04-14 DIAGNOSIS — F41.1 GENERALIZED ANXIETY DISORDER: ICD-10-CM

## 2020-04-14 DIAGNOSIS — F60.3 BORDERLINE PERSONALITY DISORDER (H): ICD-10-CM

## 2020-04-14 DIAGNOSIS — F31.32 BIPOLAR AFFECTIVE DISORDER, CURRENTLY DEPRESSED, MODERATE (H): ICD-10-CM

## 2020-04-14 DIAGNOSIS — F10.21 ALCOHOL DEPENDENCE IN EARLY, EARLY PARTIAL, SUSTAINED FULL, OR SUSTAINED PARTIAL REMISSION (H): ICD-10-CM

## 2020-04-28 ENCOUNTER — OFFICE VISIT - HEALTHEAST (OUTPATIENT)
Dept: BEHAVIORAL HEALTH | Facility: CLINIC | Age: 45
End: 2020-04-28

## 2020-04-28 DIAGNOSIS — F41.1 GENERALIZED ANXIETY DISORDER: ICD-10-CM

## 2020-04-28 DIAGNOSIS — F31.32 BIPOLAR AFFECTIVE DISORDER, CURRENTLY DEPRESSED, MODERATE (H): ICD-10-CM

## 2020-04-28 DIAGNOSIS — F12.21 MODERATE TETRAHYDROCANNABINOL (THC) DEPENDENCE IN EARLY REMISSION (H): ICD-10-CM

## 2020-04-28 DIAGNOSIS — F60.3 BORDERLINE PERSONALITY DISORDER (H): ICD-10-CM

## 2020-04-28 DIAGNOSIS — F10.21 ALCOHOL DEPENDENCE IN EARLY, EARLY PARTIAL, SUSTAINED FULL, OR SUSTAINED PARTIAL REMISSION (H): ICD-10-CM

## 2020-05-12 ENCOUNTER — OFFICE VISIT - HEALTHEAST (OUTPATIENT)
Dept: BEHAVIORAL HEALTH | Facility: CLINIC | Age: 45
End: 2020-05-12

## 2020-05-12 DIAGNOSIS — F60.3 BORDERLINE PERSONALITY DISORDER (H): ICD-10-CM

## 2020-05-12 DIAGNOSIS — F12.21 MODERATE TETRAHYDROCANNABINOL (THC) DEPENDENCE IN EARLY REMISSION (H): ICD-10-CM

## 2020-05-12 DIAGNOSIS — F31.32 BIPOLAR AFFECTIVE DISORDER, CURRENTLY DEPRESSED, MODERATE (H): ICD-10-CM

## 2020-05-12 DIAGNOSIS — F41.1 GENERALIZED ANXIETY DISORDER: ICD-10-CM

## 2020-05-12 DIAGNOSIS — F10.21 ALCOHOL DEPENDENCE IN EARLY, EARLY PARTIAL, SUSTAINED FULL, OR SUSTAINED PARTIAL REMISSION (H): ICD-10-CM

## 2020-05-18 ENCOUNTER — AMBULATORY - HEALTHEAST (OUTPATIENT)
Dept: BEHAVIORAL HEALTH | Facility: CLINIC | Age: 45
End: 2020-05-18

## 2020-05-26 ENCOUNTER — OFFICE VISIT - HEALTHEAST (OUTPATIENT)
Dept: BEHAVIORAL HEALTH | Facility: CLINIC | Age: 45
End: 2020-05-26

## 2020-05-26 DIAGNOSIS — F31.32 BIPOLAR AFFECTIVE DISORDER, CURRENTLY DEPRESSED, MODERATE (H): ICD-10-CM

## 2020-05-26 DIAGNOSIS — F41.1 GENERALIZED ANXIETY DISORDER: ICD-10-CM

## 2020-05-26 DIAGNOSIS — F10.21 ALCOHOL DEPENDENCE IN EARLY, EARLY PARTIAL, SUSTAINED FULL, OR SUSTAINED PARTIAL REMISSION (H): ICD-10-CM

## 2020-05-26 DIAGNOSIS — F12.21 MODERATE TETRAHYDROCANNABINOL (THC) DEPENDENCE IN EARLY REMISSION (H): ICD-10-CM

## 2020-05-26 DIAGNOSIS — F60.3 BORDERLINE PERSONALITY DISORDER (H): ICD-10-CM

## 2020-05-26 ASSESSMENT — ANXIETY QUESTIONNAIRES
2. NOT BEING ABLE TO STOP OR CONTROL WORRYING: NEARLY EVERY DAY
IF YOU CHECKED OFF ANY PROBLEMS ON THIS QUESTIONNAIRE, HOW DIFFICULT HAVE THESE PROBLEMS MADE IT FOR YOU TO DO YOUR WORK, TAKE CARE OF THINGS AT HOME, OR GET ALONG WITH OTHER PEOPLE: EXTREMELY DIFFICULT
4. TROUBLE RELAXING: NEARLY EVERY DAY
7. FEELING AFRAID AS IF SOMETHING AWFUL MIGHT HAPPEN: NEARLY EVERY DAY
5. BEING SO RESTLESS THAT IT IS HARD TO SIT STILL: MORE THAN HALF THE DAYS
3. WORRYING TOO MUCH ABOUT DIFFERENT THINGS: NEARLY EVERY DAY
GAD7 TOTAL SCORE: 20
6. BECOMING EASILY ANNOYED OR IRRITABLE: NEARLY EVERY DAY
1. FEELING NERVOUS, ANXIOUS, OR ON EDGE: NEARLY EVERY DAY

## 2020-05-26 ASSESSMENT — PATIENT HEALTH QUESTIONNAIRE - PHQ9: SUM OF ALL RESPONSES TO PHQ QUESTIONS 1-9: 13

## 2020-06-09 ENCOUNTER — OFFICE VISIT - HEALTHEAST (OUTPATIENT)
Dept: BEHAVIORAL HEALTH | Facility: CLINIC | Age: 45
End: 2020-06-09

## 2020-06-09 DIAGNOSIS — F60.3 BORDERLINE PERSONALITY DISORDER (H): ICD-10-CM

## 2020-06-09 DIAGNOSIS — F31.32 BIPOLAR AFFECTIVE DISORDER, CURRENTLY DEPRESSED, MODERATE (H): ICD-10-CM

## 2020-06-09 DIAGNOSIS — F12.21 MODERATE TETRAHYDROCANNABINOL (THC) DEPENDENCE IN EARLY REMISSION (H): ICD-10-CM

## 2020-06-09 DIAGNOSIS — F41.1 GENERALIZED ANXIETY DISORDER: ICD-10-CM

## 2020-06-09 DIAGNOSIS — F10.21 ALCOHOL DEPENDENCE IN EARLY, EARLY PARTIAL, SUSTAINED FULL, OR SUSTAINED PARTIAL REMISSION (H): ICD-10-CM

## 2020-06-23 ENCOUNTER — OFFICE VISIT - HEALTHEAST (OUTPATIENT)
Dept: BEHAVIORAL HEALTH | Facility: CLINIC | Age: 45
End: 2020-06-23

## 2020-06-23 DIAGNOSIS — F12.21 MODERATE TETRAHYDROCANNABINOL (THC) DEPENDENCE IN EARLY REMISSION (H): ICD-10-CM

## 2020-06-23 DIAGNOSIS — F60.3 BORDERLINE PERSONALITY DISORDER (H): ICD-10-CM

## 2020-06-23 DIAGNOSIS — F10.21 ALCOHOL DEPENDENCE IN EARLY, EARLY PARTIAL, SUSTAINED FULL, OR SUSTAINED PARTIAL REMISSION (H): ICD-10-CM

## 2020-06-23 DIAGNOSIS — F31.32 BIPOLAR AFFECTIVE DISORDER, CURRENTLY DEPRESSED, MODERATE (H): ICD-10-CM

## 2020-06-23 DIAGNOSIS — F41.1 GENERALIZED ANXIETY DISORDER: ICD-10-CM

## 2020-07-07 ENCOUNTER — OFFICE VISIT - HEALTHEAST (OUTPATIENT)
Dept: BEHAVIORAL HEALTH | Facility: CLINIC | Age: 45
End: 2020-07-07

## 2020-07-07 DIAGNOSIS — F60.3 BORDERLINE PERSONALITY DISORDER (H): ICD-10-CM

## 2020-07-07 DIAGNOSIS — F31.32 BIPOLAR AFFECTIVE DISORDER, CURRENTLY DEPRESSED, MODERATE (H): ICD-10-CM

## 2020-07-07 DIAGNOSIS — F12.21 MODERATE TETRAHYDROCANNABINOL (THC) DEPENDENCE IN EARLY REMISSION (H): ICD-10-CM

## 2020-07-07 DIAGNOSIS — F10.21 ALCOHOL DEPENDENCE IN EARLY, EARLY PARTIAL, SUSTAINED FULL, OR SUSTAINED PARTIAL REMISSION (H): ICD-10-CM

## 2020-07-07 DIAGNOSIS — F41.1 GENERALIZED ANXIETY DISORDER: ICD-10-CM

## 2020-07-21 ENCOUNTER — OFFICE VISIT - HEALTHEAST (OUTPATIENT)
Dept: BEHAVIORAL HEALTH | Facility: CLINIC | Age: 45
End: 2020-07-21

## 2020-07-21 DIAGNOSIS — F31.32 BIPOLAR AFFECTIVE DISORDER, CURRENTLY DEPRESSED, MODERATE (H): ICD-10-CM

## 2020-07-21 DIAGNOSIS — F60.3 BORDERLINE PERSONALITY DISORDER (H): ICD-10-CM

## 2020-07-21 DIAGNOSIS — F10.21 ALCOHOL DEPENDENCE IN EARLY, EARLY PARTIAL, SUSTAINED FULL, OR SUSTAINED PARTIAL REMISSION (H): ICD-10-CM

## 2020-07-21 DIAGNOSIS — F12.21 MODERATE TETRAHYDROCANNABINOL (THC) DEPENDENCE IN EARLY REMISSION (H): ICD-10-CM

## 2020-07-21 DIAGNOSIS — F41.1 GENERALIZED ANXIETY DISORDER: ICD-10-CM

## 2020-08-04 ENCOUNTER — OFFICE VISIT - HEALTHEAST (OUTPATIENT)
Dept: BEHAVIORAL HEALTH | Facility: CLINIC | Age: 45
End: 2020-08-04

## 2020-08-04 DIAGNOSIS — F60.3 BORDERLINE PERSONALITY DISORDER (H): ICD-10-CM

## 2020-08-04 DIAGNOSIS — F41.1 GENERALIZED ANXIETY DISORDER: ICD-10-CM

## 2020-08-04 DIAGNOSIS — F31.32 BIPOLAR AFFECTIVE DISORDER, CURRENTLY DEPRESSED, MODERATE (H): ICD-10-CM

## 2020-08-04 DIAGNOSIS — F10.21 ALCOHOL DEPENDENCE IN EARLY, EARLY PARTIAL, SUSTAINED FULL, OR SUSTAINED PARTIAL REMISSION (H): ICD-10-CM

## 2020-08-04 DIAGNOSIS — F12.21 MODERATE TETRAHYDROCANNABINOL (THC) DEPENDENCE IN EARLY REMISSION (H): ICD-10-CM

## 2020-08-27 ENCOUNTER — AMBULATORY - HEALTHEAST (OUTPATIENT)
Dept: BEHAVIORAL HEALTH | Facility: CLINIC | Age: 45
End: 2020-08-27

## 2020-08-27 ENCOUNTER — OFFICE VISIT - HEALTHEAST (OUTPATIENT)
Dept: BEHAVIORAL HEALTH | Facility: CLINIC | Age: 45
End: 2020-08-27

## 2020-08-27 DIAGNOSIS — F31.32 BIPOLAR AFFECTIVE DISORDER, CURRENTLY DEPRESSED, MODERATE (H): ICD-10-CM

## 2020-08-27 DIAGNOSIS — F10.21 ALCOHOL DEPENDENCE IN EARLY, EARLY PARTIAL, SUSTAINED FULL, OR SUSTAINED PARTIAL REMISSION (H): ICD-10-CM

## 2020-08-27 DIAGNOSIS — F60.3 BORDERLINE PERSONALITY DISORDER (H): ICD-10-CM

## 2020-08-27 DIAGNOSIS — F41.1 GENERALIZED ANXIETY DISORDER: ICD-10-CM

## 2020-09-10 ENCOUNTER — OFFICE VISIT - HEALTHEAST (OUTPATIENT)
Dept: BEHAVIORAL HEALTH | Facility: CLINIC | Age: 45
End: 2020-09-10

## 2020-09-10 DIAGNOSIS — F41.1 GENERALIZED ANXIETY DISORDER: ICD-10-CM

## 2020-09-10 DIAGNOSIS — F12.21 MODERATE TETRAHYDROCANNABINOL (THC) DEPENDENCE IN EARLY REMISSION (H): ICD-10-CM

## 2020-09-10 DIAGNOSIS — F10.21 ALCOHOL DEPENDENCE IN EARLY, EARLY PARTIAL, SUSTAINED FULL, OR SUSTAINED PARTIAL REMISSION (H): ICD-10-CM

## 2020-09-10 DIAGNOSIS — F60.3 BORDERLINE PERSONALITY DISORDER (H): ICD-10-CM

## 2020-09-10 DIAGNOSIS — F31.32 BIPOLAR AFFECTIVE DISORDER, CURRENTLY DEPRESSED, MODERATE (H): ICD-10-CM

## 2020-09-24 ENCOUNTER — OFFICE VISIT - HEALTHEAST (OUTPATIENT)
Dept: BEHAVIORAL HEALTH | Facility: CLINIC | Age: 45
End: 2020-09-24

## 2020-09-24 DIAGNOSIS — F60.3 BORDERLINE PERSONALITY DISORDER (H): ICD-10-CM

## 2020-09-24 DIAGNOSIS — F31.32 BIPOLAR AFFECTIVE DISORDER, CURRENTLY DEPRESSED, MODERATE (H): ICD-10-CM

## 2020-09-24 DIAGNOSIS — F10.21 ALCOHOL DEPENDENCE IN EARLY, EARLY PARTIAL, SUSTAINED FULL, OR SUSTAINED PARTIAL REMISSION (H): ICD-10-CM

## 2020-09-24 DIAGNOSIS — F12.21 MODERATE TETRAHYDROCANNABINOL (THC) DEPENDENCE IN EARLY REMISSION (H): ICD-10-CM

## 2020-09-24 DIAGNOSIS — F41.1 GENERALIZED ANXIETY DISORDER: ICD-10-CM

## 2020-10-08 ENCOUNTER — AMBULATORY - HEALTHEAST (OUTPATIENT)
Dept: BEHAVIORAL HEALTH | Facility: CLINIC | Age: 45
End: 2020-10-08

## 2020-10-08 ENCOUNTER — OFFICE VISIT - HEALTHEAST (OUTPATIENT)
Dept: BEHAVIORAL HEALTH | Facility: CLINIC | Age: 45
End: 2020-10-08

## 2020-10-08 DIAGNOSIS — F10.21 ALCOHOL DEPENDENCE IN EARLY, EARLY PARTIAL, SUSTAINED FULL, OR SUSTAINED PARTIAL REMISSION (H): ICD-10-CM

## 2020-10-08 DIAGNOSIS — F60.3 BORDERLINE PERSONALITY DISORDER (H): ICD-10-CM

## 2020-10-08 DIAGNOSIS — F41.1 GENERALIZED ANXIETY DISORDER: ICD-10-CM

## 2020-10-08 DIAGNOSIS — F12.21 MODERATE TETRAHYDROCANNABINOL (THC) DEPENDENCE IN EARLY REMISSION (H): ICD-10-CM

## 2020-10-08 DIAGNOSIS — F31.32 BIPOLAR AFFECTIVE DISORDER, CURRENTLY DEPRESSED, MODERATE (H): ICD-10-CM

## 2020-10-13 ENCOUNTER — OFFICE VISIT - HEALTHEAST (OUTPATIENT)
Dept: BEHAVIORAL HEALTH | Facility: CLINIC | Age: 45
End: 2020-10-13

## 2020-10-13 DIAGNOSIS — F41.1 GENERALIZED ANXIETY DISORDER: ICD-10-CM

## 2020-10-13 DIAGNOSIS — F31.32 BIPOLAR AFFECTIVE DISORDER, CURRENTLY DEPRESSED, MODERATE (H): ICD-10-CM

## 2020-10-13 DIAGNOSIS — F60.3 BORDERLINE PERSONALITY DISORDER (H): ICD-10-CM

## 2020-10-13 DIAGNOSIS — F10.21 ALCOHOL DEPENDENCE IN EARLY, EARLY PARTIAL, SUSTAINED FULL, OR SUSTAINED PARTIAL REMISSION (H): ICD-10-CM

## 2020-10-13 DIAGNOSIS — F12.21 MODERATE TETRAHYDROCANNABINOL (THC) DEPENDENCE IN EARLY REMISSION (H): ICD-10-CM

## 2020-10-22 ENCOUNTER — OFFICE VISIT - HEALTHEAST (OUTPATIENT)
Dept: BEHAVIORAL HEALTH | Facility: CLINIC | Age: 45
End: 2020-10-22

## 2020-10-22 DIAGNOSIS — F31.32 BIPOLAR AFFECTIVE DISORDER, CURRENTLY DEPRESSED, MODERATE (H): ICD-10-CM

## 2020-10-22 DIAGNOSIS — F60.3 BORDERLINE PERSONALITY DISORDER (H): ICD-10-CM

## 2020-10-22 DIAGNOSIS — F10.21 ALCOHOL DEPENDENCE IN EARLY, EARLY PARTIAL, SUSTAINED FULL, OR SUSTAINED PARTIAL REMISSION (H): ICD-10-CM

## 2020-10-22 DIAGNOSIS — F41.1 GENERALIZED ANXIETY DISORDER: ICD-10-CM

## 2020-10-22 DIAGNOSIS — F12.21 MODERATE TETRAHYDROCANNABINOL (THC) DEPENDENCE IN EARLY REMISSION (H): ICD-10-CM

## 2020-11-05 ENCOUNTER — OFFICE VISIT - HEALTHEAST (OUTPATIENT)
Dept: BEHAVIORAL HEALTH | Facility: CLINIC | Age: 45
End: 2020-11-05

## 2020-11-05 DIAGNOSIS — F60.3 BORDERLINE PERSONALITY DISORDER (H): ICD-10-CM

## 2020-11-05 DIAGNOSIS — F41.1 GENERALIZED ANXIETY DISORDER: ICD-10-CM

## 2020-11-05 DIAGNOSIS — F10.21 ALCOHOL DEPENDENCE IN EARLY, EARLY PARTIAL, SUSTAINED FULL, OR SUSTAINED PARTIAL REMISSION (H): ICD-10-CM

## 2020-11-05 DIAGNOSIS — F31.32 BIPOLAR AFFECTIVE DISORDER, CURRENTLY DEPRESSED, MODERATE (H): ICD-10-CM

## 2020-11-11 ENCOUNTER — OFFICE VISIT - HEALTHEAST (OUTPATIENT)
Dept: BEHAVIORAL HEALTH | Facility: CLINIC | Age: 45
End: 2020-11-11

## 2020-11-11 DIAGNOSIS — F41.1 GENERALIZED ANXIETY DISORDER: ICD-10-CM

## 2020-11-11 DIAGNOSIS — F31.32 BIPOLAR AFFECTIVE DISORDER, CURRENTLY DEPRESSED, MODERATE (H): ICD-10-CM

## 2020-11-11 DIAGNOSIS — F12.21 MODERATE TETRAHYDROCANNABINOL (THC) DEPENDENCE IN EARLY REMISSION (H): ICD-10-CM

## 2020-11-11 DIAGNOSIS — F60.3 BORDERLINE PERSONALITY DISORDER (H): ICD-10-CM

## 2020-11-11 DIAGNOSIS — F10.21 ALCOHOL DEPENDENCE IN EARLY, EARLY PARTIAL, SUSTAINED FULL, OR SUSTAINED PARTIAL REMISSION (H): ICD-10-CM

## 2020-11-19 ENCOUNTER — OFFICE VISIT - HEALTHEAST (OUTPATIENT)
Dept: BEHAVIORAL HEALTH | Facility: CLINIC | Age: 45
End: 2020-11-19

## 2020-11-19 DIAGNOSIS — F10.21 ALCOHOL DEPENDENCE IN EARLY, EARLY PARTIAL, SUSTAINED FULL, OR SUSTAINED PARTIAL REMISSION (H): ICD-10-CM

## 2020-11-19 DIAGNOSIS — F41.1 GENERALIZED ANXIETY DISORDER: ICD-10-CM

## 2020-11-19 DIAGNOSIS — F12.21 MODERATE TETRAHYDROCANNABINOL (THC) DEPENDENCE IN EARLY REMISSION (H): ICD-10-CM

## 2020-11-19 DIAGNOSIS — F60.3 BORDERLINE PERSONALITY DISORDER (H): ICD-10-CM

## 2020-11-19 DIAGNOSIS — F31.32 BIPOLAR AFFECTIVE DISORDER, CURRENTLY DEPRESSED, MODERATE (H): ICD-10-CM

## 2020-12-03 ENCOUNTER — OFFICE VISIT - HEALTHEAST (OUTPATIENT)
Dept: BEHAVIORAL HEALTH | Facility: CLINIC | Age: 45
End: 2020-12-03

## 2020-12-03 DIAGNOSIS — F31.32 BIPOLAR AFFECTIVE DISORDER, CURRENTLY DEPRESSED, MODERATE (H): ICD-10-CM

## 2020-12-03 DIAGNOSIS — F10.21 ALCOHOL DEPENDENCE IN EARLY, EARLY PARTIAL, SUSTAINED FULL, OR SUSTAINED PARTIAL REMISSION (H): ICD-10-CM

## 2020-12-03 DIAGNOSIS — F12.21 MODERATE TETRAHYDROCANNABINOL (THC) DEPENDENCE IN EARLY REMISSION (H): ICD-10-CM

## 2020-12-03 DIAGNOSIS — F60.3 BORDERLINE PERSONALITY DISORDER (H): ICD-10-CM

## 2020-12-03 DIAGNOSIS — F41.1 GENERALIZED ANXIETY DISORDER: ICD-10-CM

## 2020-12-17 ENCOUNTER — AMBULATORY - HEALTHEAST (OUTPATIENT)
Dept: BEHAVIORAL HEALTH | Facility: CLINIC | Age: 45
End: 2020-12-17

## 2020-12-17 ENCOUNTER — OFFICE VISIT - HEALTHEAST (OUTPATIENT)
Dept: BEHAVIORAL HEALTH | Facility: CLINIC | Age: 45
End: 2020-12-17

## 2020-12-17 ENCOUNTER — COMMUNICATION - HEALTHEAST (OUTPATIENT)
Dept: BEHAVIORAL HEALTH | Facility: CLINIC | Age: 45
End: 2020-12-17

## 2020-12-17 DIAGNOSIS — F41.1 GENERALIZED ANXIETY DISORDER: ICD-10-CM

## 2020-12-17 DIAGNOSIS — F12.21 MODERATE TETRAHYDROCANNABINOL (THC) DEPENDENCE IN EARLY REMISSION (H): ICD-10-CM

## 2020-12-17 DIAGNOSIS — F60.3 BORDERLINE PERSONALITY DISORDER (H): ICD-10-CM

## 2020-12-17 DIAGNOSIS — F10.21 ALCOHOL DEPENDENCE IN EARLY, EARLY PARTIAL, SUSTAINED FULL, OR SUSTAINED PARTIAL REMISSION (H): ICD-10-CM

## 2020-12-17 DIAGNOSIS — F31.32 BIPOLAR AFFECTIVE DISORDER, CURRENTLY DEPRESSED, MODERATE (H): ICD-10-CM

## 2020-12-17 ASSESSMENT — ANXIETY QUESTIONNAIRES
2. NOT BEING ABLE TO STOP OR CONTROL WORRYING: MORE THAN HALF THE DAYS
3. WORRYING TOO MUCH ABOUT DIFFERENT THINGS: SEVERAL DAYS
GAD7 TOTAL SCORE: 12
GAD7 TOTAL SCORE: 12
1. FEELING NERVOUS, ANXIOUS, OR ON EDGE: MORE THAN HALF THE DAYS
7. FEELING AFRAID AS IF SOMETHING AWFUL MIGHT HAPPEN: NEARLY EVERY DAY
4. TROUBLE RELAXING: SEVERAL DAYS
2. NOT BEING ABLE TO STOP OR CONTROL WORRYING: SEVERAL DAYS
6. BECOMING EASILY ANNOYED OR IRRITABLE: SEVERAL DAYS
1. FEELING NERVOUS, ANXIOUS, OR ON EDGE: MORE THAN HALF THE DAYS
4. TROUBLE RELAXING: SEVERAL DAYS
7. FEELING AFRAID AS IF SOMETHING AWFUL MIGHT HAPPEN: NEARLY EVERY DAY
5. BEING SO RESTLESS THAT IT IS HARD TO SIT STILL: MORE THAN HALF THE DAYS
3. WORRYING TOO MUCH ABOUT DIFFERENT THINGS: SEVERAL DAYS
5. BEING SO RESTLESS THAT IT IS HARD TO SIT STILL: NEARLY EVERY DAY
6. BECOMING EASILY ANNOYED OR IRRITABLE: SEVERAL DAYS

## 2020-12-17 ASSESSMENT — PATIENT HEALTH QUESTIONNAIRE - PHQ9: SUM OF ALL RESPONSES TO PHQ QUESTIONS 1-9: 14

## 2021-01-14 ENCOUNTER — OFFICE VISIT - HEALTHEAST (OUTPATIENT)
Dept: BEHAVIORAL HEALTH | Facility: CLINIC | Age: 46
End: 2021-01-14

## 2021-01-14 DIAGNOSIS — F41.1 GENERALIZED ANXIETY DISORDER: ICD-10-CM

## 2021-01-14 DIAGNOSIS — F10.21 ALCOHOL DEPENDENCE IN EARLY, EARLY PARTIAL, SUSTAINED FULL, OR SUSTAINED PARTIAL REMISSION (H): ICD-10-CM

## 2021-01-14 DIAGNOSIS — F31.32 BIPOLAR AFFECTIVE DISORDER, CURRENTLY DEPRESSED, MODERATE (H): ICD-10-CM

## 2021-01-14 DIAGNOSIS — F60.3 BORDERLINE PERSONALITY DISORDER (H): ICD-10-CM

## 2021-01-14 DIAGNOSIS — F12.21 MODERATE TETRAHYDROCANNABINOL (THC) DEPENDENCE IN EARLY REMISSION (H): ICD-10-CM

## 2021-01-18 ENCOUNTER — COMMUNICATION - HEALTHEAST (OUTPATIENT)
Dept: BEHAVIORAL HEALTH | Facility: CLINIC | Age: 46
End: 2021-01-18

## 2021-01-21 ENCOUNTER — OFFICE VISIT - HEALTHEAST (OUTPATIENT)
Dept: BEHAVIORAL HEALTH | Facility: CLINIC | Age: 46
End: 2021-01-21

## 2021-01-21 DIAGNOSIS — F12.21 MODERATE TETRAHYDROCANNABINOL (THC) DEPENDENCE IN EARLY REMISSION (H): ICD-10-CM

## 2021-01-21 DIAGNOSIS — F60.3 BORDERLINE PERSONALITY DISORDER (H): ICD-10-CM

## 2021-01-21 DIAGNOSIS — F10.21 ALCOHOL DEPENDENCE IN EARLY, EARLY PARTIAL, SUSTAINED FULL, OR SUSTAINED PARTIAL REMISSION (H): ICD-10-CM

## 2021-01-21 DIAGNOSIS — F31.32 BIPOLAR AFFECTIVE DISORDER, CURRENTLY DEPRESSED, MODERATE (H): ICD-10-CM

## 2021-01-21 DIAGNOSIS — F41.1 GENERALIZED ANXIETY DISORDER: ICD-10-CM

## 2021-01-28 ENCOUNTER — OFFICE VISIT - HEALTHEAST (OUTPATIENT)
Dept: BEHAVIORAL HEALTH | Facility: CLINIC | Age: 46
End: 2021-01-28

## 2021-01-28 DIAGNOSIS — F60.3 BORDERLINE PERSONALITY DISORDER (H): ICD-10-CM

## 2021-01-28 DIAGNOSIS — F12.21 MODERATE TETRAHYDROCANNABINOL (THC) DEPENDENCE IN EARLY REMISSION (H): ICD-10-CM

## 2021-01-28 DIAGNOSIS — F31.32 BIPOLAR AFFECTIVE DISORDER, CURRENTLY DEPRESSED, MODERATE (H): ICD-10-CM

## 2021-01-28 DIAGNOSIS — F10.21 ALCOHOL DEPENDENCE IN EARLY, EARLY PARTIAL, SUSTAINED FULL, OR SUSTAINED PARTIAL REMISSION (H): ICD-10-CM

## 2021-01-28 DIAGNOSIS — F41.1 GENERALIZED ANXIETY DISORDER: ICD-10-CM

## 2021-03-04 ENCOUNTER — OFFICE VISIT - HEALTHEAST (OUTPATIENT)
Dept: BEHAVIORAL HEALTH | Facility: CLINIC | Age: 46
End: 2021-03-04

## 2021-03-04 DIAGNOSIS — F10.21 ALCOHOL DEPENDENCE IN EARLY, EARLY PARTIAL, SUSTAINED FULL, OR SUSTAINED PARTIAL REMISSION (H): ICD-10-CM

## 2021-03-04 DIAGNOSIS — F12.21 MODERATE TETRAHYDROCANNABINOL (THC) DEPENDENCE IN EARLY REMISSION (H): ICD-10-CM

## 2021-03-04 DIAGNOSIS — F60.3 BORDERLINE PERSONALITY DISORDER (H): ICD-10-CM

## 2021-03-04 DIAGNOSIS — F31.32 BIPOLAR AFFECTIVE DISORDER, CURRENTLY DEPRESSED, MODERATE (H): ICD-10-CM

## 2021-03-04 DIAGNOSIS — F41.1 GENERALIZED ANXIETY DISORDER: ICD-10-CM

## 2021-03-18 ENCOUNTER — OFFICE VISIT - HEALTHEAST (OUTPATIENT)
Dept: BEHAVIORAL HEALTH | Facility: CLINIC | Age: 46
End: 2021-03-18

## 2021-03-18 DIAGNOSIS — F31.32 BIPOLAR AFFECTIVE DISORDER, CURRENTLY DEPRESSED, MODERATE (H): ICD-10-CM

## 2021-03-18 DIAGNOSIS — F10.21 ALCOHOL DEPENDENCE IN EARLY, EARLY PARTIAL, SUSTAINED FULL, OR SUSTAINED PARTIAL REMISSION (H): ICD-10-CM

## 2021-03-18 DIAGNOSIS — F41.1 GENERALIZED ANXIETY DISORDER: ICD-10-CM

## 2021-03-18 DIAGNOSIS — F12.21 MODERATE TETRAHYDROCANNABINOL (THC) DEPENDENCE IN EARLY REMISSION (H): ICD-10-CM

## 2021-03-18 DIAGNOSIS — F60.3 BORDERLINE PERSONALITY DISORDER (H): ICD-10-CM

## 2021-04-08 ENCOUNTER — AMBULATORY - HEALTHEAST (OUTPATIENT)
Dept: BEHAVIORAL HEALTH | Facility: CLINIC | Age: 46
End: 2021-04-08

## 2021-04-08 ENCOUNTER — OFFICE VISIT - HEALTHEAST (OUTPATIENT)
Dept: BEHAVIORAL HEALTH | Facility: CLINIC | Age: 46
End: 2021-04-08

## 2021-04-08 DIAGNOSIS — F41.1 GENERALIZED ANXIETY DISORDER: ICD-10-CM

## 2021-04-08 DIAGNOSIS — F60.3 BORDERLINE PERSONALITY DISORDER (H): ICD-10-CM

## 2021-04-08 DIAGNOSIS — F12.21 MODERATE TETRAHYDROCANNABINOL (THC) DEPENDENCE IN EARLY REMISSION (H): ICD-10-CM

## 2021-04-08 DIAGNOSIS — F10.21 ALCOHOL DEPENDENCE IN EARLY, EARLY PARTIAL, SUSTAINED FULL, OR SUSTAINED PARTIAL REMISSION (H): ICD-10-CM

## 2021-04-08 DIAGNOSIS — F31.32 BIPOLAR AFFECTIVE DISORDER, CURRENTLY DEPRESSED, MODERATE (H): ICD-10-CM

## 2021-04-22 ENCOUNTER — OFFICE VISIT - HEALTHEAST (OUTPATIENT)
Dept: BEHAVIORAL HEALTH | Facility: CLINIC | Age: 46
End: 2021-04-22

## 2021-04-22 ENCOUNTER — COMMUNICATION - HEALTHEAST (OUTPATIENT)
Dept: BEHAVIORAL HEALTH | Facility: CLINIC | Age: 46
End: 2021-04-22

## 2021-04-22 DIAGNOSIS — F12.21 MODERATE TETRAHYDROCANNABINOL (THC) DEPENDENCE IN EARLY REMISSION (H): ICD-10-CM

## 2021-04-22 DIAGNOSIS — F60.3 BORDERLINE PERSONALITY DISORDER (H): ICD-10-CM

## 2021-04-22 DIAGNOSIS — F31.32 BIPOLAR AFFECTIVE DISORDER, CURRENTLY DEPRESSED, MODERATE (H): ICD-10-CM

## 2021-04-22 DIAGNOSIS — F41.1 GENERALIZED ANXIETY DISORDER: ICD-10-CM

## 2021-04-22 DIAGNOSIS — F10.21 ALCOHOL DEPENDENCE IN EARLY, EARLY PARTIAL, SUSTAINED FULL, OR SUSTAINED PARTIAL REMISSION (H): ICD-10-CM

## 2021-04-22 ASSESSMENT — ANXIETY QUESTIONNAIRES
2. NOT BEING ABLE TO STOP OR CONTROL WORRYING: SEVERAL DAYS
5. BEING SO RESTLESS THAT IT IS HARD TO SIT STILL: NOT AT ALL
6. BECOMING EASILY ANNOYED OR IRRITABLE: NOT AT ALL
GAD7 TOTAL SCORE: 8
5. BEING SO RESTLESS THAT IT IS HARD TO SIT STILL: SEVERAL DAYS
6. BECOMING EASILY ANNOYED OR IRRITABLE: NOT AT ALL
3. WORRYING TOO MUCH ABOUT DIFFERENT THINGS: SEVERAL DAYS
1. FEELING NERVOUS, ANXIOUS, OR ON EDGE: SEVERAL DAYS
4. TROUBLE RELAXING: SEVERAL DAYS
GAD7 TOTAL SCORE: 6
7. FEELING AFRAID AS IF SOMETHING AWFUL MIGHT HAPPEN: MORE THAN HALF THE DAYS
1. FEELING NERVOUS, ANXIOUS, OR ON EDGE: SEVERAL DAYS
4. TROUBLE RELAXING: SEVERAL DAYS
2. NOT BEING ABLE TO STOP OR CONTROL WORRYING: MORE THAN HALF THE DAYS
3. WORRYING TOO MUCH ABOUT DIFFERENT THINGS: SEVERAL DAYS
7. FEELING AFRAID AS IF SOMETHING AWFUL MIGHT HAPPEN: MORE THAN HALF THE DAYS

## 2021-04-22 ASSESSMENT — PATIENT HEALTH QUESTIONNAIRE - PHQ9: SUM OF ALL RESPONSES TO PHQ QUESTIONS 1-9: 9

## 2021-05-06 ENCOUNTER — OFFICE VISIT - HEALTHEAST (OUTPATIENT)
Dept: BEHAVIORAL HEALTH | Facility: CLINIC | Age: 46
End: 2021-05-06

## 2021-05-06 DIAGNOSIS — F31.32 BIPOLAR AFFECTIVE DISORDER, CURRENTLY DEPRESSED, MODERATE (H): ICD-10-CM

## 2021-05-06 DIAGNOSIS — F41.1 GENERALIZED ANXIETY DISORDER: ICD-10-CM

## 2021-05-06 DIAGNOSIS — F10.21 ALCOHOL DEPENDENCE IN EARLY, EARLY PARTIAL, SUSTAINED FULL, OR SUSTAINED PARTIAL REMISSION (H): ICD-10-CM

## 2021-05-06 DIAGNOSIS — F60.3 BORDERLINE PERSONALITY DISORDER (H): ICD-10-CM

## 2021-05-06 DIAGNOSIS — F12.21 MODERATE TETRAHYDROCANNABINOL (THC) DEPENDENCE IN EARLY REMISSION (H): ICD-10-CM

## 2021-05-20 ENCOUNTER — OFFICE VISIT - HEALTHEAST (OUTPATIENT)
Dept: BEHAVIORAL HEALTH | Facility: CLINIC | Age: 46
End: 2021-05-20

## 2021-05-20 DIAGNOSIS — F41.1 GENERALIZED ANXIETY DISORDER: ICD-10-CM

## 2021-05-20 DIAGNOSIS — F12.21 MODERATE TETRAHYDROCANNABINOL (THC) DEPENDENCE IN EARLY REMISSION (H): ICD-10-CM

## 2021-05-20 DIAGNOSIS — F60.3 BORDERLINE PERSONALITY DISORDER (H): ICD-10-CM

## 2021-05-20 DIAGNOSIS — F10.21 ALCOHOL DEPENDENCE IN EARLY, EARLY PARTIAL, SUSTAINED FULL, OR SUSTAINED PARTIAL REMISSION (H): ICD-10-CM

## 2021-05-20 DIAGNOSIS — F31.32 BIPOLAR AFFECTIVE DISORDER, CURRENTLY DEPRESSED, MODERATE (H): ICD-10-CM

## 2021-05-26 ASSESSMENT — PATIENT HEALTH QUESTIONNAIRE - PHQ9
SUM OF ALL RESPONSES TO PHQ QUESTIONS 1-9: 14
SUM OF ALL RESPONSES TO PHQ QUESTIONS 1-9: 18

## 2021-05-27 ASSESSMENT — PATIENT HEALTH QUESTIONNAIRE - PHQ9
SUM OF ALL RESPONSES TO PHQ QUESTIONS 1-9: 17
SUM OF ALL RESPONSES TO PHQ QUESTIONS 1-9: 13
SUM OF ALL RESPONSES TO PHQ QUESTIONS 1-9: 9
SUM OF ALL RESPONSES TO PHQ QUESTIONS 1-9: 14

## 2021-05-28 ASSESSMENT — ANXIETY QUESTIONNAIRES
GAD7 TOTAL SCORE: 20
GAD7 TOTAL SCORE: 21
GAD7 TOTAL SCORE: 21
GAD7 TOTAL SCORE: 12
GAD7 TOTAL SCORE: 6
GAD7 TOTAL SCORE: 18

## 2021-05-31 NOTE — PROGRESS NOTES
Taylor Bryan attended 3 hours of group on 8/19/19    The group topic was Stress Management, patient was responsive to topic.     Patients engagement in the group session: high     Total group size: 7    Supervising MD: Dr. Sushma Dhillon, [facility]  8/19/2019, 12:09 PM

## 2021-05-31 NOTE — PROGRESS NOTES
"Addiction Services - Initial Services Plan     Patient  Name: Taylor Bryan  MRN: [id number]   : 1975  Admit Date: 19       Patient describes their immediate need: To learn recovery skills to prevent relapse. Establishing services with Occupational Therapy. Developing self-care skills.    Are there any immediate Safety Needs such as (physical, stability, mobility):  Pt is able to get medical care as needed. Pt denies immediate concerns.     Immediate Health Needs and Plan:   None    Vulnerable Adult: No     Issues to be addressed in the first sessions:   Orientation to program    Patient strengths and needs:   Strengths identified as \"persaverence, open-minded, considerate, compassionate, kind, good with boundaries, communicative, and able to hold multiple perspectives at the same time.\"  Needs identified as \"Insecurity, self-care, meeting responsibilities, motivation, opening up to people, trust issues, but primarily I got to find that motivation, that drive.\"    Plan for patient for time between intake and completion of the treatment plan:   Attend all group therapy sessions as directed, complete all written and oral assignments as directed, and remain clean and sober. A relapse, if any, must be reported to staff immediately in order to ensure you are receiving the proper level of care. If you cannot attend a group therapy session you must call contact information provided in intake folder and leave a message before or during group hours.       Vulnerable Adult Review   [X] Review of the Facility Abuse Prevention plan was reviewed with the patient   [X] No Individual Abuse Plan is necessary   [ ] In addition to the Facility Abuse Prevention plan, an Individual Abuse Plan will be put in place       Staff Name/Title: Hilary Hussein   Date: 2019  Time: 9:59 AM        "

## 2021-05-31 NOTE — PROGRESS NOTES
Correct pharmacy verified with patient and confirmed in snapshot? [x] yes []no    Charge captured ? [x] yes  [] no    Medications Phoned  to Pharmacy [] yes [x]no  Name of Pharmacist:  List Medications, including dose, quantity and instructions      Medication Prescriptions given to patient   [] yes  [x] no   List the name of the drug the prescription was written for.       Medications ordered this visit were e-scribed.  Verified by order class [] yes  [x] no    Medication changes or discontinuations were communicated to patient's pharmacy: [] yes  [x] no    UA collected [x] yes  [] no    [place] Prescription Monitoring Program Reviewed? [x] yes  [] no    Referrals were made to:  Psychology     Future appointment was made: [x] yes  [] no  09/25/2019  Dictation completed at time of chart check: [] yes  [x] no    I have checked the documentation for today s encounters and the above information has been reviewed and completed.
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
## 2021-05-31 NOTE — PROGRESS NOTES
Taylor Bryan attended 3 hours of group on 8/21/2019.     The group topic was Stress Management, patient was responsive to topic.     Patients engagement in the group session: low     Total number of patients present 7.     Supervising MD: Dr. Sushma Dhillon, [facility]  8/21/2019, 12:12 PM

## 2021-05-31 NOTE — PROGRESS NOTES
Taylor Carbone attended 3 hours of group today.      The group topic was Stress Management.       Patients engagement in the group session: high      Total group size: 4.      Supervising MD during this group was Dr. Ordaz.          Rizwan Orozco [facility]  8/23/2019  12 pm
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
## 2021-05-31 NOTE — PROGRESS NOTES
Taylor Griffinerner attended 3 hours of group on 8/30/2019.     The group topic was Dual Diagnosis I, patient was responsive to topic.     Patients engagement in the group session: medium     Total number of patients present 8.     Supervising MD: Dr. Sushma Dhillon, [facility]  8/30/2019, 12:25 PM

## 2021-05-31 NOTE — PROGRESS NOTES
Taylor Bryan attended 3 hours of group on 8/26/2019.     The group topic was Dual Diagnosis I, patient was responsive to topic.     Patients engagement in the group session: high     Total number of patients present 6.     Supervising MD: Dr. Sushma Roman, LMFT, [facility]  8/26/2019, 2:20 PM

## 2021-05-31 NOTE — PROGRESS NOTES
Taylor Bryan attended 3 hours of group on 8/28/2019.     The group topic was Dual Diagnosis I, patient was responsive to topic.     Patients engagement in the group session: high     Total number of patients present 6.     Supervising MD: Dr. Sushma Roman  8/28/2019, 1:35 PM

## 2021-05-31 NOTE — PROGRESS NOTES
Patient completed Diagnostic Assessment for Mental Health on 7/23/19 with JOSE Fontenot LADC. Diagnoses at that time were:   Bipolar I disorder, moderate, current or most recent episode depressed  Alcohol use disorder, severe  Cannabis use disorder, severe  Tobacco use disorder, severe  Unspecified intellectual disability     Assessment was reviewed by [facility] staff.     STACIA Johns LADC  8/20/2019, 9:07 AM
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
## 2021-06-01 NOTE — PROGRESS NOTES
Taylor Bryan attended 3 hours of group on 9/6/2019.     The group topic was Dual Diagnosis II, patient was responsive to topic.     Patients engagement in the group session: high    Total number of patients present 12.     Supervising MD: Dr. Sushma Roman  9/6/2019, 2:22 PM

## 2021-06-01 NOTE — PROGRESS NOTES
Taylor Bryan attended 3 hours of group on 9/13/2019.     The group topic was Feelings/Emotions, patient was responsive to topic.     Patients engagement in the group session: high     Total number of patients present 8.     Supervising MD: Dr. Asha Donohue, [facility]  9/13/2019, 2:05 PM

## 2021-06-01 NOTE — PROGRESS NOTES
Taylor Bryan attended 2 hours of group on 9/25/2019.     The group topic was Change, patient was responsive to topic.     Patients engagement in the group session: high     Total number of patients present 11.     Supervising MD: Dr. Sushma Sharma, [facility]  9/25/2019, 1:38 PM

## 2021-06-01 NOTE — PROGRESS NOTES
Taylor Bryan attended 1 hours of group on 9/16/2019.     The group topic was Thinking, patient was responsive to topic.     Patients engagement in the group session: high     Total number of patients present 10.     Supervising MD: Dr. Sushma Roman  9/16/2019, 2:06 PM

## 2021-06-01 NOTE — PROGRESS NOTES
Taylor Bryan attended 2 hours of group on 9/4/2019.     The group topic was Dual Diagnosis II, patient was responsive to topic.     Patients engagement in the group session: high     Total number of patients present 8.     Supervising MD: Dr. Sushma Roman  9/4/2019, 2:27 PM

## 2021-06-01 NOTE — PROGRESS NOTES
Taylor Bryan attended 3 hours of group on 9/11/2019.     The group topic was Feelings/Emotions, patient was responsive to topic.     Patients engagement in the group session: high     Total number of patients present 5.     Supervising MD: Dr. Sushma Roman  9/11/2019, 1:52 PM

## 2021-06-01 NOTE — PROGRESS NOTES
INDIVIDUAL SESSION NOTE    D) Patient met 1:1 with counselor for an individual session lasting 40 minutes.     A) Writer and patient discussed patient's housing concerns. Writer and patient also discussed food resources patient can access due to living on a fixed income. Patient and writer discussed ways to develop skill use.    R) Patient reports being shekhar on DBT skill use and states she wants to begin practicing skills again. Patient reports running out of money this month and that her mother is going to help her. Patient discussed concerns about which place to rent next month and weighed the pros and cons between her options.     T) Patient will be given a DBT diary card to complete weekly.       Patient treatment was reviewed. Changes were  made. Patient was actively and directly involved in the treatment plan review and updates.     Tresa Roman MA, LMFT, [facility]  9/9/2019, 2:43 PM

## 2021-06-01 NOTE — PROGRESS NOTES
Taylor Bryan attended 3 hours of group on 10/2/2019.     The group topic was Addiction 101, patient was responsive to topic.     Patients engagement in the group session: high     Total number of patients present 9.     Supervising MD: Dr. Sushma Roman  10/2/2019, 2:58 PM

## 2021-06-01 NOTE — PROGRESS NOTES
Taylor Bryan attended 3 hours of group today.     The group topic was Dual Diagnosis II, patient was responsive to topic.     Patients engagement in the group session: high     Total group size: 12      Tresa Roman  9/6/2019, 2:10 PM

## 2021-06-01 NOTE — PROGRESS NOTES
Taylor Bryan attended 3 hours of group on 9/27/2019.     The group topic was Change, patient was responsive to topic.     Patients engagement in the group session: high     Total number of patients present 8.     Supervising MD: Dr. Asha Sharma, [facility]  9/27/2019, 12:12 PM

## 2021-06-01 NOTE — PROGRESS NOTES
Taylor Bryan attended 3 hours of group on 9/18/2019.     The group topic was Thinking, patient was responsive to topic.     Patients engagement in the group session: high     Total number of patients present 6.     Supervising MD: Dr. Sushma Roman  9/18/2019, 2:03 PM
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
## 2021-06-01 NOTE — PROGRESS NOTES
Taylor Bryan attended 3 hours of group on 9/9/2019.     The group topic was Feelings/Emotions, patient was responsive to topic.     Patients engagement in the group session: high     Total number of patients present 6.     Supervising MD: Dr. Sushma Roman  9/9/2019, 2:20 PM
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
## 2021-06-01 NOTE — PROGRESS NOTES
Taylor Bryan attended 3 hours of group on 9/30/2019.     The group topic was Addiction 101, patient was responsive to topic.     Patients engagement in the group session: medium     Total number of patients present 8.     Supervising MD: Dr. Sushma Roman  9/30/2019, 1:38 PM

## 2021-06-01 NOTE — PROGRESS NOTES
Taylor Bryan attended 3 hours of group on 9/23/2019.     The group topic was Change, patient was responsive to topic.     Patients engagement in the group session: high     Total number of patients present 10.     Supervising MD: Dr. Sushma Sharma, [facility]  9/23/2019, 2:03 PM
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
## 2021-06-02 NOTE — PROGRESS NOTES
Taylor Bryan attended 3 hours of group on 10/9/2019.     The group topic was Relapse Prevention, patient was responsive to topic.     Patient's engagement in the group session: high     Total number of patients present 11.     Supervising MD: Dr. Sushma Roman  10/9/2019, 4:05 PM

## 2021-06-02 NOTE — PROGRESS NOTES
Taylor Bryan attended 3 hours of group on 10/30/2019.     The group topic was Grief and Loss, patient was responsive to topic.     Patient's engagement in the group session: high     Total number of patients present 11.     Supervising MD: Dr. Sushma Miller  10/30/2019, 12:15 PM

## 2021-06-02 NOTE — PROGRESS NOTES
Taylor Bryan attended 2 hours of group on 10/23/2019.     The group topic was Relationships/Boundaries, patient was responsive to topic.     Patient's engagement in the group session: medium     Total number of patients present 10.     Supervising MD: Dr. Sushma Roman  10/23/2019, 4:26 PM

## 2021-06-02 NOTE — PROGRESS NOTES
Outpatient Mental Health Treatment Plan    Name:  Taylor Bryan  :  1975  MRN:  [id number]    Treatment Plan:  Initial Treatment Plan 10/8/2019  Intake/initial treatment plan date:  10/8/2019  Benefit and risks and alternatives have been discussed: Yes  Is this treatment appropriate with minimal intrusion/restrictions: Yes  Estimated duration of treatment:  Approximately 10+ sessions.  Anticipated frequency of services:  Every 2 weeks  Necessity for frequency: This frequency is needed to establish therapeutic goals and for continuity of care in order to monitor progress.  Necessity for treatment: To address cognitive, behavioral, and/or emotional barriers in order to work toward goals and to improve quality of life.    Session Type: Patient is presenting for an Individual session.    Plan:      ? Depression    Goal:  Decrease average depression level from 10 to 3.   Strategies:    ?[x] Decrease social isolation     [x] Increase involvement in meaningful activities     ?[x] Discuss sleep hygiene     ?[x] Explore thoughts and expectations about self and others     ?[] Process grief (loss of significant person, independence, role,        etc.)     ?[x] Assess for suicide risk     ?[x] Implement physical activity routine (with physician approval)     [x] Consider introduction of bibliotherapy and/or videos     [x] Continue compliance with medical treatment plan (or explore         barriers)       ?  ?Degree to which this is a problem: 4  Degree to which goal is met: 0  Date of Review: 10/8/2019            ?   ? Anxiety    Goal:  Decrease average anxiety level from 9 to 3.   Strategies: ? [x]Learn and practice relaxation techniques and other coping        strategies (e.g., thought stopping, reframing, meditation)     ? [x] Increase involvement in meaningful activities     ? [x] Discuss sleep hygiene     ? [x] Explore thoughts and expectations about self and others     ? [x] Identify and monitor triggers for  panic/anxiety symptoms     ? [x] Implement physical activity routine (with physician approval)     ? [x] Consider introduction of bibliotherapy and/or videos     ? [x] Continue compliance with medical treatment plan (or explore          barriers)                                       []     Degree to which this is a problem: 4  Degree to which goal is met: 0  Date of Review: 10/8/2019     Substance use  Goal:  To be 100% free of substance abuse.   Strategies: ? [x] Consider referral for chemical dependency evaluation     ? [x] Discuss barriers to participating in AA or other peer-facilitated           groups         [x] Address environmental factors which may interfere with                                                    sobriety     ? [x] Explore short-term versus long-term consequences of use     ? [x] Continue compliance with medical treatment plan (or explore          barriers)         ?  Degree to which this is a problem: 3  Degree to which goal is met: 1  Date of Review: 10/8/2019         Functional Impairment:  1=Not at all/Rarely  2=Some days  3=Most Days  4=Every Day    Personal : 4  Family : 3  Social : 4   Work/school : 4    Diagnosis:  Bipolar I, depressed versus Major Depressive Disorder, moderate to severe  Generalized anxiety Disorder  Social anxiety disorder  Borderline Personality Disorder in partial remission  Alcohol Dependency in early remission  THC dependency in early remission    WHODAS 2.0 12-item version: Self-administered: 69%       Scores presented in qualifiers to represent level of disability.  SEVERE Problem (high, extreme, ...) 50-95%     H1= 30  H2= 30  H3= 30    Clinical assessments and measures completed:. KADEN-7 and PHQ-9, Menard Suicide assessment scale     Strengths:  Intelligence, gutsy, tenacious, survivor, kind, knows how to be considerate.  Limitations:  Addiction, dependence on others, laziness, lack of motivation, lack of worth ethic.  Cultural Considerations: Patient  "raised in a middle class family where Catholicism, \"purity\" and rules to live by. Patient raised and now identifies as a \"Roman Catholic witch\" or a \"Roman Catholic douglass\".  Higher power is \"gender neutral Universe\".    Persons responsible for this plan: Patient and Provider            Psychotherapist Signature           Patient Signature:              Guardian Signature             Provider: Performed and documented by JOSE Balderas   Date:  10/8/2019      "

## 2021-06-02 NOTE — PROGRESS NOTES
"INDIVIDUAL SESSION NOTE    D) Patient met 1:1 with counselor for an individual session lasting 45 minutes.     A) Writer and patient discussed patient's recent skill use and urges. Patient and writer worked to challenge some of patient's recent perceptions and judgmental thoughts.     R) Patient endorsed noticing that recently old \"borderline\" behaviors have been increasing. She has noticed being more judgmental of others and that her perception of situations and people have been off. Patient endorsed active skill use and states that she has been able to cope with urges to use by using skills.     T) Patient will continue to fill out DBT diary cards and attend group 3x weekly.       Patient treatment was reviewed. Changes were not made. Patient was actively and directly involved in the treatment plan review and updates.     ROCKY Elliott, [facility]  10/8/2019, 12:15 PM      "
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
## 2021-06-02 NOTE — PROGRESS NOTES
Taylor Bryan attended 3 hours of group on 10/11/2019.     The group topic was Relapse Prevention, patient was responsive to topic.     Patient's engagement in the group session: high     Total number of patients present 10.     Supervising MD: Dr. Sushma Roman  10/11/2019, 1:51 PM

## 2021-06-02 NOTE — PROGRESS NOTES
Taylor Bryan attended 3 hours of group on 10/7/2019.     The group topic was Relapse Prevention, patient was responsive to topic.     Patients engagement in the group session: high     Total number of patients present 9.     Supervising MD: Dr. Sushma Roman  10/7/2019, 4:06 PM

## 2021-06-02 NOTE — PROGRESS NOTES
Taylor Bryan attended 3 hours of group on 10/25/2019.     The group topic was Relationships/Boundaries, patient was responsive to topic.     Patient's engagement in the group session: high     Total number of patients present 8.     Supervising MD: Dr. Sushma Roman  10/25/2019, 1:46 PM

## 2021-06-02 NOTE — PROGRESS NOTES
Taylor Bryan attended 1 hours of group on 10/18/2019. Patient called staff indicating that she would be in late due to lack of sleep.     The group topic was Recovery Support, patient was responsive to topic.     Patient's engagement in the group session: high     Total number of patients present 10.     Supervising MD: Dr. Asha Donohue, [facility]  10/18/2019, 12:08 PM

## 2021-06-02 NOTE — PROGRESS NOTES
Taylor Bryan attended 3 hours of group on 10/14/2019.     The group topic was Recovery Support, patient was responsive to topic.     Patient's engagement in the group session: high     Total number of patients present 12.     Supervising MD: Dr. Asha Donohue, [facility] [place][note 2]  10/14/2019, 12:10 PM

## 2021-06-02 NOTE — PROGRESS NOTES
Taylor Bryan attended 3 hours of group on 10/4/2019.     The group topic was Addiction 101, patient was responsive to topic.     Patients engagement in the group session: high     Total number of patients present 6.     Supervising MD: Dr. Sushma Roman  10/4/2019, 3:04 PM

## 2021-06-02 NOTE — PROGRESS NOTES
"Mental Health Visit Note    10/22/2019    Start time: 3:09pm   Stop Time: 3:58pm   Session # 4    Session Type: Patient is presenting for an Individual session.   Persons present include patient and therapist.    Taylor Bryan is a 44 y.o. female is being seen today for   Chief Complaint   Patient presents with     MH Follow Up     Depression     low mood with interpersonal difficulties     Anxiety     anxiety in the context of interpersonal difficulties   .     New symptoms or complaints: Low mood and anxiety in the context of interpersonal difficulties    Functional Impairment:   Personal: 4  Family: 4  Work: 4  Social:4    Clinical assessment of mental status: [Same MS as 10/8/2019]  Patient's grooming is Disheveled, attire is Appropriate, and age appears Appears Stated. Behavior towards examiner is Cooperative, motor activity is Within normal, and eye contact is Variable contact.  Patient's mood is anxious and Depressed, and affect is Flat, Anxious and Depressed.  Speech/language is Dramatic, attention is Within normal, and concentration is Within normal. Thought process is Within normal, thought content is Within noraml and  Within normal.  Patient's orientation is X 3, memory is  No Evidence of Impairment, judgement is Impairment and Minimal, and estimated intelligence is Average. Demonstrated insight is Adequate while fund of knowledge is adequate.      Suicidal/Homicidal Ideation present: None Reported This Session    Patient's impression of their current status:   \"I walk into a room and it's crickets.\"  Reports mood is anxious and somewhat low.  Processes difficult interpersonal interactions at her sober house.   Processes stress related to needing to move because of pain with doing stairs at her sober house.  Feels that CBD oil she is using is helpful to her anxiety.  Processes some anxiety related to invitation by previous employer to apply for a job. Last worked in Feb-March of 2019.  Previously " "worked as Certified Peer Specialist. \"I'm nervous about office politics.\"     Patient reports follow up with therapy homework: Patient utilized cognitive/DBT skills discussed in session to challenge negative thoughts that came up in the context of difficult interpersonal difficulites.  She structured alone time into her days, using parked car to get away.  She continued attendance at outpt CD treatment, remains sober from substance abuse.    Therapist impression of patients current state: Patient mood stressed (anxiety, depression) in the midst of interpersonal difficulties but is managing stressors without acting out or self-harm.  She remains open and cooperative in session.     Processed negative cognitions, reinforced strengths, validated patient efforts and feelings.  Brainstormed and brainstormed DBT \"crumbling responses\": patient identifies she may quit, hide, let them win.  Discussed use of DEAR MAN: Describe, Express, Assert, Reinforce, Mindful, Act confident, Negotiate.   Discussed running use of CBD oil by her CD treatment providers.     Type of psychotherapeutic technique provided: Client centered, Solution-focused, CBT and DBT, Harm Reduction    Progress toward short term goals:Progress as expected,   Patient utilized cognitive/DBT skills discussed in session to challenge negative thoughts that came up in the context of difficult interpersonal difficulites.  She structured alone time into her days, using parked car to get away.  She continued attendance at outpt CD treatment, remains sober from substance abuse.    Review of long term goals: Not done at today's visit and Date of last review 10/8/2019    Diagnosis:   1. Generalized anxiety disorder    2. Bipolar affective disorder, currently depressed, moderate (H)    3. Borderline personality disorder (H)    4. Alcohol dependence in early, early partial, sustained full, or sustained partial remission (H)    5. Moderate tetrahydrocannabinol (THC) " dependence in early remission (H)        Plan and Follow up: Patient will return for follow-up in 2 weeks. She will run her use of CBD oil past her prescriber.   She will utlize cognitive and DBT skills as discussed in session (DEAR man, fact checking) to challenge negative thoughts that come up in the context of interpersonal difficulties.  She will continue to structure alone time into her day, in her car as necessary but will not drive if she feels strong emotions.  She will adhere to safety plan to call crisis numbers or present to ER should she have impulses to harm hersefl.  She will continue attendance at outpatient CD treatment and abstain from substance abuse.    Discharge Criteria/Planning: Client has chronic symptoms and ongoing therapy for maintenance stability recommended.    Jacquelyn MALDONADO, LICSW  10/22/2019
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
## 2021-06-03 ENCOUNTER — OFFICE VISIT - HEALTHEAST (OUTPATIENT)
Dept: BEHAVIORAL HEALTH | Facility: CLINIC | Age: 46
End: 2021-06-03

## 2021-06-03 VITALS — WEIGHT: 181 LBS | HEIGHT: 61 IN | BODY MASS INDEX: 34.17 KG/M2

## 2021-06-03 VITALS
SYSTOLIC BLOOD PRESSURE: 112 MMHG | BODY MASS INDEX: 34.17 KG/M2 | DIASTOLIC BLOOD PRESSURE: 71 MMHG | HEIGHT: 61 IN | HEART RATE: 75 BPM | WEIGHT: 181 LBS

## 2021-06-03 DIAGNOSIS — F10.21 ALCOHOL DEPENDENCE IN EARLY, EARLY PARTIAL, SUSTAINED FULL, OR SUSTAINED PARTIAL REMISSION (H): ICD-10-CM

## 2021-06-03 DIAGNOSIS — F31.32 BIPOLAR AFFECTIVE DISORDER, CURRENTLY DEPRESSED, MODERATE (H): ICD-10-CM

## 2021-06-03 DIAGNOSIS — F41.1 GENERALIZED ANXIETY DISORDER: ICD-10-CM

## 2021-06-03 DIAGNOSIS — F12.21 MODERATE TETRAHYDROCANNABINOL (THC) DEPENDENCE IN EARLY REMISSION (H): ICD-10-CM

## 2021-06-03 DIAGNOSIS — F60.3 BORDERLINE PERSONALITY DISORDER (H): ICD-10-CM

## 2021-06-03 NOTE — PROGRESS NOTES
Taylor Bryan attended 2 hours of group on 11/22/2019.     The group topic was Wellness, patient was responsive to topic.     Patient's engagement in the group session: medium     Total number of patients present 10.     Supervising MD: Dr. Sushma Roman  11/22/2019, 2:27 PM

## 2021-06-03 NOTE — PROGRESS NOTES
Taylor Bryan attended 3 hours of group on 11/11/2019.     The group topic was Stress Management, patient was responsive to topic.     Patient's engagement in the group session: high     Total number of patients present 8.    Patient discussed recent hospitalization and relapse. Writer and patient scheduled individual session on 11/14 to discuss further.      Supervising MD: Dr. Sushma Roman  11/11/2019, 1:55 PM
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
## 2021-06-03 NOTE — PROGRESS NOTES
"Mental Health Visit Note    11/19/2019    Start time: 3:33pm   Stop Time: 4:02pm [31 minutes]  Session # 5    Session Type: Patient is presenting for an Individual session.   Persons present include patient and therapist.    Taylor Bryan is a 44 y.o. female is being seen today for   Chief Complaint   Patient presents with     Follow-up     Anger     anger with increased stress     Anxiety     anxiety with relational fears on new job     Depression     low mood with difficult emotions     Addiction     in early remission     Alcohol Problem     in early remission   .     New symptoms or complaints: acute anger and anxiety in the context of stress and relational issues in new job    Functional Impairment:   Personal: 4  Family: 4  Work: 4  Social:4    Clinical assessment of mental status: Patient's grooming is Disheveled, attire is Appropriate, and age appears Appears Stated. Behavior towards examiner is Cooperative, motor activity is Within normal, and eye contact is Variable contact.  Patient's mood is Angry, Anxious and Depressed, and affect is Irritable, Flat, Anxious and Depressed.  Speech/language is Dramatic, attention is Within normal, and concentration is Within normal. Thought process is Within normal, thought content is Within noraml and  Within normal.  Patient's orientation is X 3, memory is  No Evidence of Impairment, judgement is Impairment and Minimal, and estimated intelligence is Average. Demonstrated insight is Adequate while fund of knowledge is adequate.      Suicidal/Homicidal Ideation present: None Reported This Session    Patient's impression of their current status:   \"I'm angry and aggressive.  I want to see blood.\"  Patient reports mood as angry.  Patient processes increasing anger x 2 weeks. Endorses \"violent thoughts\" without specific target or imagined action.   Blames medications: took herself off all psych meds while she was using TBD oil, now back on her psych medications.  " "Suggests withdrawal of a medication caused her anger, while starting it again caused anger as well.    Patient reports mixed follow up with therapy homework: She did meet with MD and stopped use of CBD oil.  States she \"lost track\" of coping strategies she has available to her to challenge negative thoughts.  Continues to structure alone time into her day, remaining committed not to drive if she is feeling strong emotions. Remains committed to call crisis numbers or present to ER if she has impulses to harm herself or other person. [facility] and [facility] from [facility].    Therapist impression of patients current state:  Acute emotional dysregulation in the context of stressors with upcoming job, but notable that patient is managing same without acting out towards self or others. She is open and cooperative in session, with some minor lack of insight into her own emotional status. Borderline traits, but willing to utilize skills to manage sx.  Open and cooperative in session.    Processed negative cognitions, reinforced strengths, validated patient efforts and feelings.  Processed anger as a secondary emotion, explored possible triggers.  Patient identifies that she's been worried about a co-worker that she experienced as bullying when she last worked there.  Identifies fear underneath the anger. Discussed strategies to handle behavior from co-worker, actually feeding of the behavior when she is reactive and \"hooked\", and less issue when she can keep her reaction to herself. Discussed alternatives to \"crumbling\" responses (quit, hide, letting them win).   Reminded patient she can choose to manage her reactions outside of her work environment, and can expect that her co-workers would do so as well.     Type of psychotherapeutic technique provided: Client centered, Solution-focused, CBT and DBT, Harm Reduction    Progress toward short term goals:Mixed progress:  She did meet with " "MD and stopped use of CBD oil.  States she \"lost track\" of coping strategies she has available to her to challenge negative thoughts.  Continues to structure alone time into her day, remaining committed not to drive if she is feeling strong emotions. Remains committed to call crisis numbers or present to ER if she has impulses to harm herself or other person. She continues to attend out[facility] treatment and abstains from substance abuse.    Review of long term goals: Not done at today's visit and Date of last review 10/8/2019    Diagnosis:   1. Generalized anxiety disorder    2. Bipolar affective disorder, currently depressed, moderate (H)    3. Borderline personality disorder (H)    4. Alcohol dependence in early, early partial, sustained full, or sustained partial remission (H)    5. Moderate tetrahydrocannabinol (THC) dependence in early remission (H)        Plan and Follow up: Patient will return for follow-up in 2 weeks. She will utilize self-calming techniques with any altercation or perceived maltreatment at her new job.  She will utilize cognitive strategies to challenge negative thoughts that come up in the context of interpersonal difficulties. She will adhere to safety plan to call crisis numbers or present to ED if she has impulses to harm herself or other.  She will continue attendance at out[facility] treatment and abstain from substance abuse.    Discharge Criteria/Planning: Client has chronic symptoms and ongoing therapy for maintenance stability recommended.    Jacquelyn MALDONADO, LICSW  11/19/2019  "
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
## 2021-06-03 NOTE — PROGRESS NOTES
Taylor Bryan attended 3 hours of group on 11/18/2019.     The group topic was Wellness, patient was responsive to topic.     Patient's engagement in the group session: medium     Total number of patients present 9.     Supervising MD: Dr. Sushma Roman  11/18/2019, 3:23 PM

## 2021-06-03 NOTE — PROGRESS NOTES
Taylor Bryan attended 3 hours of group on 11/25/2019.     The group topic was Dual Diagnosis I, patient was responsive to topic.     Patient's engagement in the group session: medium     Total number of patients present 10.     Supervising MD: Dr. Sushma Roman  11/25/2019, 3:22 PM

## 2021-06-03 NOTE — PROGRESS NOTES
"INDIVIDUAL SESSION NOTE    D) Patient met 1:1 with counselor for an individual session lasting 40 minutes.     A) Writer inquired about patient's current mental health stability and how her sobriety has been impacted. Current effectiveness of treatment services was discussed. Writer also discussed concerns about patient's level of disclosure and transparency about mental health concerns and substance use issues. Patient and writer discussed how patient's perception of safety could be impacting her ability to trust providers. Writer informed patient that she would be continuing to attend 2x per week instead of stepping down due to recent relapses, patient was in agreement.     R) Patient reported continuing to experience high levels of rage and attributed this to her medications. Patient reports plan to discuss with her psychiatrist later this afternoon. Patient expressed frustration about this appointment as she felt she knew how it would end. Writer attempted to fact check this and patient level of dysregulation increased. Patient verbalized not trusting writer, her psychiatry, or her therapist and therefore has not been honest with these providers. Patient endorsed drinking a bottle of wine last night as a way to get \"relief\".     T) Patient and writer to follow up in 2 weeks to determine if patient is in need of different treatment services.       Patient treatment was reviewed. Changes were made. Patient was actively and directly involved in the treatment plan review and updates.     Tresa Roman MA, LMFT, [facility]  11/14/2019, 12:18 PM      "

## 2021-06-03 NOTE — PROGRESS NOTES
No show (Late Cancel) for psychotherapy today.  Patient left message that she was admitted (inpatient?). Care Everywhere not signed and does not show admisison.  Will not 'count' this as no show, pending proof of MH or medical admission.  Jacquelyn Amanda, GIANFRANCOSW

## 2021-06-03 NOTE — PROGRESS NOTES
Taylor Bryan attended 3 hours of group today.     The group topic was Stress Management, patient was responsive to topic.     Patient's engagement in the group session: high     Total group size: 7      SHANNA Johns  11/13/2019, 12:18 PM

## 2021-06-03 NOTE — TELEPHONE ENCOUNTER
Writer reached out to patient regarding recent hospitalization. Requested a call back to discuss.
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
## 2021-06-04 VITALS
HEART RATE: 76 BPM | DIASTOLIC BLOOD PRESSURE: 75 MMHG | BODY MASS INDEX: 35.9 KG/M2 | WEIGHT: 190 LBS | SYSTOLIC BLOOD PRESSURE: 120 MMHG

## 2021-06-04 NOTE — PROGRESS NOTES
Taylor Bryan attended 3 hours of group on 12/6/2019.     The group topic was Dual Diagnosis II, patient was responsive to topic.     Patient's engagement in the group session: high     Total number of patients present 8.     Supervising MD: Dr. Sushma Roman  12/6/2019, 1:49 PM
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
## 2021-06-04 NOTE — PROGRESS NOTES
Taylor Bryan attended 2 hours of group on 12/4/2019.     The group topic was Dual Diagnosis II, patient was responsive to topic.     Patient's engagement in the group session: medium     Total number of patients present 7.     Supervising MD: Dr. Sushma Roman  12/4/2019, 1:46 PM

## 2021-06-04 NOTE — PROGRESS NOTES
Taylor Bryan attended 1 hours of group on 12/11/2019.     The group topic was Feelings/Emotions, patient was responsive to topic.     Patient's engagement in the group session: low     Total number of patients present 10.     Supervising MD: Dr. Ordaz     Patient signed IRAIDA for [facility] and stated that today was her last day in group as she was requesting transfer to [facility]'s program.     Tresa Roman  12/11/2019, 3:27 PM

## 2021-06-04 NOTE — PROGRESS NOTES
Taylor Bryan attended 1 hours of group on 12/9/2019.     The group topic was Feelings/Emotions, patient was responsive to topic.     Patient's engagement in the group session: medium     Total number of patients present 7.     Supervising MD: Dr. Sushma Roman  12/9/2019, 2:39 PM
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
## 2021-06-04 NOTE — PROGRESS NOTES
INDIVIDUAL SESSION NOTE    D) Patient met 1:1 with counselor for an individual session lasting 35 minutes.     A) Writer and patient discussed aftercare plans.     R) Patient expressed interest in [employer] [employer] program due to difficulties she was experiencing finding housing and that she would be applying for supplemental insurance due to qualifying for a program for working individuals with a disability. Writer informed patient that she may or may not be accepted into this program and Rayne would need to review her information. If patient does decided to move forward with the referral she would be discharged from the current program due to being referred to another outpatient program.     T) Patient will provide writer with her decision on Friday as to whether or not she wants a referral to [facility] program.       Patient treatment was reviewed. Changes were not made. Patient was actively and directly involved in the treatment plan review and updates.     Tresa Roman   12/9/2019, 2:40 PM

## 2021-06-04 NOTE — PROGRESS NOTES
"Mental Health Visit Note    12/17/2019    Start time: 3:12pm   Stop Time: 3:59  Session # 5    Session Type: Patient is presenting for an Individual session.   Persons present include patient and therapist.    Taylor Bryan is a 44 y.o. female is being seen today for   Chief Complaint   Patient presents with     MH Follow Up     Depression     somewhat improved, working PT     Anxiety     moderate anxiety with housing situation     Alcohol Problem     in early remission   .     New symptoms or complaints: Stress with being unable to find housing that will accept her.    Functional Impairment:   Personal: 3  Family: 3  Work: 2-3  Social: 3    Clinical assessment of mental status: Patient's grooming is Disheveled, attire is Appropriate, and age appears Appears Stated. Behavior towards examiner is Cooperative, motor activity is Within normal, and eye contact is Variable contact.  Patient's mood is Euthymic, and affect is Congruent w/content of speech.  Speech/language is Dramatic, attention is Within normal, and concentration is Within normal. Thought process is Within normal, thought content is Within noraml and  Within normal.  Patient's orientation is X 3, memory is  No Evidence of Impairment, judgement is Impairment and Minimal, and estimated intelligence is Average. Demonstrated insight is Adequate while fund of knowledge is adequate.    Suicidal/Homicidal Ideation present: None Reported This Session    Patient's impression of their current status:   \"I have no drama in my life right now.  It's kind of refreshing.\"  Patient reports mood as still somewhat anxious but improved both in depression and anxiety.  She is working [employer]  for  [employer] 3x/week, 5 hours/week.  \"It's stressful but it's pretty good.  Processes current stressors: staying with her mother right now due to being unable to rent an apartment due to credit history.    She will see psychiatry this week, wants to increase her " "Gabapentin for anxiety.    Patient reports follow up with therapy homework:  She utilized self-soothing strategies on the job: started crocheting to utilize self-soothing on both sides of the brain. Practiced self-calming techniques and cognitive strategies to deal with difficult interpersonal dynamics at her job.  Continues committed to safety plan to call crisis numbers or present to ED should she have impulses to harm herself or others. Continues outpatient CD treatment and is abstaining from Etoh.    Therapist impression of patients current state: Improved mental status, mostly stabilized from acute distress identified last session. Handling new PT job reasonably well. Using skills to navigate borderline personality traits.  Remains open and cooperative in session.     Processed negative cognitions, reinforced strengths, validated patient efforts and feelings. Validated the positive coping skills she utilized to interpersonal issues instead of \"crumbling\" responses (quitting, hiding).  Patient able to identify positives: \"I'm a survivor.  I've built skills over the years.\"     Type of psychotherapeutic technique provided: Client centered, Solution-focused, CBT and DBT, Harm Reduction    Progress toward short term goals:Mixed progress:  She utilized self-soothing strategies on the job: started crocheting to utilize self-soothing on both sides of the brain. Practiced self-calming techniques and cognitive strategies to deal with difficult interpersonal dynamics at her job.  Continues committed to safety plan to call crisis numbers or present to ED should she have impulses to harm herself or others. Continues outpatient CD treatment and is abstaining from Etoh.    Review of long term goals: Not done at today's visit and Date of last review 10/8/2019    Diagnosis:   1. Bipolar affective disorder, currently depressed, moderate (H)    2. Generalized anxiety disorder    3. Borderline personality disorder (H)    4. " Alcohol dependence in early, early partial, sustained full, or sustained partial remission (H)        Plan and Follow up: Patient will return for follow-up in 2 weeks.  She will utilize self calming techniques with any interpersonal altercation or perceived maltreatment at her new job.  She will utilize cognitive strategies to challenge negative thoughts that come up in that context of interpersonal difficulties.  She will adhere to safety plan to call crisis numbers or present to ED if she has impulses to hurt herself or another.  She will continue attendance at outpatient CD treatment and abstain from substance abuse.    Discharge Criteria/Planning: Client has chronic symptoms and ongoing therapy for maintenance stability recommended.    Jacquelyn Amanda MSW, LICSW  12/17/2019

## 2021-06-05 NOTE — PROGRESS NOTES
"Mental Health Visit Note    1/21/2020    Start time: 3:10pm   Stop Time: 3:56  Session # 2    Session Type: Patient is presenting for an Individual session.   Persons present include patient and therapist.    Taylor Bryan is a 44 y.o. female is being seen today for   Chief Complaint   Patient presents with      Follow Up     Anxiety     moderate social anxiety in the context of interpersonal difficulties     Depression     moderate depression with difficulty with low mood and motivation     Addiction     THC addiction in early remission     Alcohol Problem     in early remission   .     New symptoms or complaints: None    Functional Impairment:   Personal: 2-3  Family: 3  Work: 2-3  Social: 3    Clinical assessment of mental status: [Same MS as 1/14/2020]  Patient's grooming is Disheveled, attire is Appropriate, and age appears Appears Stated. Behavior towards examiner is Cooperative, motor activity is Within normal, and eye contact is Variable contact.  Patient's mood is Euthymic, and affect is Congruent w/content of speech.  Speech/language is Dramatic, attention is Within normal, and concentration is Within normal. Thought process is Within normal, thought content is Within noraml and  Within normal.  Patient's orientation is X 3, memory is  No Evidence of Impairment, judgement is Impairment and Minimal, and estimated intelligence is Average. Demonstrated insight is Adequate while fund of knowledge is adequate.    Suicidal/Homicidal Ideation present: None Reported This Session    Patient's impression of their current status:   Patient reports mood is \"pretty good\".    Says that she considers herself jazmine to have the resources that she has in place including new housing and mental health support via psychotherapy with writer.  Patient processes her difficulty with friend who has cut her off saying that patient is a \"downer\".  Patient reports ongoing difficulty maintaining friendships and \"constantly pissing " "people off\".  Has some difficulty making it through her 3-day a week job and she thinks she should reduce to 2 days/week.    Patient reports follow-up with therapy homework: She is using self calming techniques and cognitive strategies to challenge negative thoughts that came up in the context of interpersonal difficulty with a friend/acquaintance.  She remains committed to adhere to safety plan to call crisis numbers or present to ED if she has impulses to hurt herself or another person.  She is attending AA and abstaining from substance abuse.    Therapist impression of patients current state: Patient has some insight into the ways her borderline traits affect relationships and leave her feeling hurt and lonely.  Overall improvement as she is holding down work 2-3 shifts per week.  She is open and cooperative in session.    Processed negative cognitions, reinforced strengths, validated patient efforts and feelings. Offered empathic listening and reflections and questions intended to evoke change talk, explored needs and resources, and provided emotional support. Discussed pros and cons of keeping her hours higher versus reducing.    Type of psychotherapeutic technique provided: Client centered, Solution-focused, CBT and DBT, Motivational Interviewing, Harm Reduction    Progress toward short term goals:Progress as expected,   She is using self calming techniques and cognitive strategies to challenge negative thoughts that came up in the context of interpersonal difficulty with a friend/acquaintance.  She remains committed to adhere to safety plan to call crisis numbers or present to ED if she has impulses to hurt herself or another person.  She is attending AA and abstaining from substance abuse.    Review of long term goals: Not done at today's visit and Date of last review 1/14/2020    Diagnosis:   1. Bipolar affective disorder, currently depressed, moderate (H)    2. Generalized anxiety disorder    3. Borderline " personality disorder (H)    4. Alcohol dependence in early, early partial, sustained full, or sustained partial remission (H)    5. Moderate tetrahydrocannabinol (THC) dependence in early remission (H)        Plan and Follow up: Patient will return for follow-up in 2 weeks.  Patient will utilize self calming techniques as practiced in session to work with interpersonal altercation or difficulties.  She will utilize cognitive strategies to challenge negative thoughts that come up in the context of interpersonal difficulties.  She will adhere to safety plan to call crisis numbers or present to ED if she has impulses to hurt herself or another.  She will continue attendance at recovery support and AA and abstain from substance abuse.    Discharge Criteria/Planning: Client has chronic symptoms and ongoing therapy for maintenance stability recommended.    Jacquelyn MALDONADO, LICSW  1/21/2020

## 2021-06-05 NOTE — TELEPHONE ENCOUNTER
Taylor did not get the message that you were out today.  She showed up at her appointment time and said she wasn't doing good.  Jesica, RN, came out to talk to her, but she states she really needs to see you.  You have nothing for two weeks.  Would you be able to call her or maybe schedule with her during your lunch on Thursday?

## 2021-06-05 NOTE — PROGRESS NOTES
"Mental Health Visit Note    1/14/2020    Start time: 3:10pm   Stop Time: 3:56  Session # 1    Session Type: Patient is presenting for an Individual session.   Persons present include patient and therapist.    Taylor Bryan is a 44 y.o. female is being seen today for   Chief Complaint   Patient presents with     MH Follow Up     Depression     some motivational issues, but mood is overall improved     Anxiety     generalized anxiety, irritability, anxious about driving, social anxiety     Alcohol Problem     in early remission, two month sobriety   .     New symptoms or complaints: None    Functional Impairment:   Personal: 2  Family: 3  Work: 2-3  Social: 3    Clinical assessment of mental status: Patient's grooming is Disheveled, attire is Appropriate, and age appears Appears Stated. Behavior towards examiner is Cooperative, motor activity is Within normal, and eye contact is Variable contact.  Patient's mood is Euthymic, and affect is Congruent w/content of speech.  Speech/language is Dramatic, attention is Within normal, and concentration is Within normal. Thought process is Within normal, thought content is Within noraml and  Within normal.  Patient's orientation is X 3, memory is  No Evidence of Impairment, judgement is Impairment and Minimal, and estimated intelligence is Average. Demonstrated insight is Adequate while fund of knowledge is adequate.    Suicidal/Homicidal Ideation present: None Reported This Session    Patient's impression of their current status:   Patient reports mood is \"pretty good\".    Says that she considers herself jazmine to have the resources that she has in place including new housing and mental health support via psychotherapy.    Patient reports follow-up with therapy homework: Patient is using self calming techniques and cognitive strategies to challenge negative thoughts that came up with interpersonal difficulty at her new job.  Says \"it worked really well, it seemed to turn " "into a nonissue\".  She remains committed to adhere to safety plan to call crisis numbers or present to ED if she has impulses to hurt herself or another.  She is no longer attending chemical dependency treatment but is attending AA and is abstaining from substance use.    Therapist impression of patients current state: Improved emotional status, utilizing skills to manage distress that comes up and that to context of interpersonal relationships.  Mood is improved, traits of borderline personality seem to be the biggest obstacle for patient.  Handling her new part-time job reasonably well.  She remains open and cooperative in session.    Processed negative cognitions, reinforced strengths, validated patient efforts and feelings.  Offered empathic listening and reflections and questions intended to evoke change talk, explored needs and resources, and provided emotional support.   Validated the ways that she has navigated difficult interpersonal dynamics without consequences in her new job.  Discussed the ways that \"crumbling\" responses (quitting, hiding on (have not worked as well for her as using her skills.    Type of psychotherapeutic technique provided: Client centered, Solution-focused, CBT and DBT, Harm Reduction    Progress toward short term goals:Progress as expected,   Patient is using self calming techniques and cognitive strategies to challenge negative thoughts that came up with interpersonal difficulty at her new job.  Says \"it worked really well, it seemed to turn into a nonissue\".  She remains committed to adhere to safety plan to call crisis numbers or present to ED if she has impulses to hurt herself or another.  She is no longer attending chemical dependency treatment but is attending AA and is abstaining from substance use.    Review of long term goals: Long-term goals reviewed   and Treatment Plan updated    Diagnosis:   1. Generalized anxiety disorder    2. Bipolar affective disorder, currently " depressed, moderate (H)    3. Borderline personality disorder (H)    4. Moderate tetrahydrocannabinol (THC) dependence in early remission (H)    5. Alcohol dependence in early, early partial, sustained full, or sustained partial remission (H)        Plan and Follow up: Patient will return for follow-up in 2 weeks. Goals continued from last session: She will utilize self calming techniques with any interpersonal altercation or perceived maltreatment at her new job.  She will utilize cognitive strategies to challenge negative thoughts that come up in that context of interpersonal difficulties.  She will adhere to safety plan to call crisis numbers or present to ED if she has impulses to hurt herself or another.  She will continue attendance at recovery support at  and abstain from substance abuse.    Discharge Criteria/Planning: Client has chronic symptoms and ongoing therapy for maintenance stability recommended.    Jacquelyn MALDONADO, LICSW  1/14/2020

## 2021-06-06 NOTE — PROGRESS NOTES
"Telephone Visit Note    Patient Name: Taylor Bryan                   Date: 3/17/2020                                          Service Type:            Telephone Visit  **Regular 30 minute charge is dropped due to Epic not allowing this encounter to be transferred to telephone visit encounter.  Format not accepting 17993 charge.                The patient has been notified of following:     \"This telephone visit will be conducted via a call between you and your provider. We have found that certain health care needs can be provided without the need for an office visit.  This service lets us provide the care you need with a short phone conversation.    If during the course of the call the provider feels a telephone visit is not appropriate, you will not be charged for this service.\"                 Session Start Time:  4:08pm                   Session End Time:   4:38pm                Session Length:        30 minutes    Session #:      5    Attendees:  persons present include patient and therapist                DATA                            Progress Since Last Session (Related to Symptoms / Goals / Homework):  Patient reports panic attacks and acute anxiety the context of current COVID- public health crisis.  Mood is somewhat low in the context of psychosocial stressors.                             Episode of Care Goals: Patient reports mixed follow up with therapy homework: She has not yet completed DBT flashcards given by writer or choose to skills to bring back to session.  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  She utilized cognitive strategies to challenge negative thoughts that came up around her challenges in helping her clients with the current health crisis.  She remains committed to safety plan to call crisis numbers or present to ED if she has impulses to hurt herself or another person.  She is connected by " "telephone with her peers in recovery AA support and has abstained from substance abuse.                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  She is frightened of contagion in the community as well.  Anxiety and panic around her mother's building going on lockdown, worries about her vulnerability with age.  He realizes that she has been quite dependent financially on her mother.  \"I do not think I could manage without her\".                Intervention:            Processed negative cognitions, reinforced strengths, validated patient efforts and feelings.   Normalized patient challenges on the job given the nature of the crisis.Discussed safety in the community and utilization of handwashing and social distancing to maintain safety.  Discussed the ways that alcohol abuse would put her at more risk for impulsive actions that could increase her exposure.  Brainstormed ways to become more financially independent.                              ASSESSMENT: Current Emotional / Mental Status (status of significant symptoms):              Risk status (Self / Other harm or suicidal ideation)              Patient states commitment to healthy behaviors.              Patient denies current or recent suicidal ideation or behaviors.              Patient denies current or recent homicidal ideation or behaviors.              Patient denies current or recent self injurious behavior or ideation.              Patient denies other safety concerns.              Patient reports no other risk factors.              Patient remains committed to safety plan to call crisis numbers or present to ED if she has impulses to hurt herself or another person.  She is connected by telephone with her peers in recovery AA support and has " abstained from substance abuse.                Recommended that patient call 911 or go to the local ED should there be a change in any of these risk factors.                Attitude:                                   Cooperative               Orientation:                             Oriented x3.              Speech                          Rate / Production:       Normal                           Volume:                       Normal               Mood:                                      Irritable  Anxious, Depressed               Thought Content:                    Clear               Thought Form:                        Coherent  Logical               Insight:                                     Good                 Medication Compliance:              Reports taking all medications as prescribed.                Changes in Health Issues:                No further health issues. Does worry about smoking putting her at increased risk for COVID infection.                  Chemical Use Review:              Substance Use: Denies, Remains sober from both alcohol and THC.                Tobacco Use: Yes  Smoking a pack a day. Due to stress she says she is not interested in reducing cigarettes, understands that she can talk to MD about smoking cessation aides.    Diagnosis:  1. Bipolar affective disorder, currently depressed, moderate (H)    2. Generalized anxiety disorder    3. Borderline personality disorder (H)    4. Alcohol dependence in early, early partial, sustained full, or sustained partial remission (H)    5. Moderate tetrahydrocannabinol (THC) dependence in early remission (H)          PLAN: (Patient Tasks / Therapist Tasks / Other)  Patient to return for care in two weeks.  She asks for extra session given level of her panic.  Patient advised she can schedule into available opening next week.    Continued from last session: She will cut out and make into cards the DBT flashcards given by writer, choose two  skills to bring back to session. Patient will utilize self calming techniques as identified in session to work with interpersonal altercation or difficulties.  She will utilize cognitive strategies to challenge negative thoughts that come up in the context of interpersonal difficulties.  She will adhere to safety plan to call crisis numbers or present to ED if she has impulses to hurt herself or another.  She will continue attendance at recovery support and AA and abstain from substance abuse.      I have reviewed the note as documented above.  This accurately captures the substance of my conversation with the patient.  Jacquelyn Amanda, GIANFRANCOSW

## 2021-06-06 NOTE — PROGRESS NOTES
"Mental Health Visit Note    2/18/2020    Start time: 2:16pm   Stop Time: 3:02pm Session # 3    Session Type: Patient is presenting for an Individual session.   Persons present include patient and therapist.    Taylor Bryan is a 44 y.o. female is being seen today for   Chief Complaint   Patient presents with      Follow Up     Anxiety     anxiety with interpersonal difficulty     Depression     low mood with interpersonal difficulty     Alcohol Problem     in early remission     Addiction     in early remission   .     New symptoms or complaints: None    Functional Impairment:   Personal: 3  Family: 3  Work: 2-3  Social: 3    Clinical assessment of mental status: Patient's grooming is Disheveled, attire is Appropriate, and age appears Appears Stated. Behavior towards examiner is Cooperative, motor activity is Within normal, and eye contact is Variable contact.  Patient's mood is Anxious, and affect is Congruent w/content of speech, Anxious and Depressed.  Speech/language is Dramatic, attention is Within normal, and concentration is Within normal. Thought process is Within normal, thought content is Within noraml and  Within normal.  Patient's orientation is X 3, memory is  No Evidence of Impairment, judgement is Impairment and Minimal, and estimated intelligence is Average. Demonstrated insight is Adequate while fund of knowledge is adequate.    Suicidal/Homicidal Ideation present: None Reported This Session    Patient's impression of their current status:    \"I was so close to drinking but didn't do it.\"  \"I carry around these resentments.\"  \"I get so wrapped up in how other people see me.\"  \"I beat myself up when others comment on my mental illness.\"  Patient reports mood as anxious, mild to moderately depressed in the context of interpersonal difficulties.   Processes difficulty she has when others give her advice. Feels it is indirectly critical, will  ruminate on it for a day or two and allow it to spoil " "other activities for herself.    Patient reports follow up on therapy homework: She utilized self-calming techniques identified in session to work with interpersonal altercation: saying old Orthodox prayers, Netflix movies.  Felt she was able to keep from Etoh abuse by using the skills.   Attending [facility] and is working on 4th step with her peer recovery group. Abstaining from substance use. Reduced work days and felt that this was helpful to her mood.      Therapist impression of patients current state: Patient utilizing new skills to manage emotional dysregulation that results from interpersonal distress.  Some insight into ways that borderline traits affect interpersonal issues. 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  Cooperative and appreciative in session.  Patient would decompensate without regular therapy sessions to support the use of coping and emotional regulation skills.     Processed negative cognitions, reinforced strengths, validated patient efforts and feelings.  Patient admits she gets anxious and afraid that \"the others might be right\" that she's less worthy, lazy, doesn't try hard enough. Discussed using DBT skills to address emotional irregulation.   Brainstormed options to use to self-soothe: songs, old Buddhist prayers, Netflix movies.  Provided patient with DBT skill flashcards.      Type of psychotherapeutic technique provided: Client centered, Solution-focused, CBT and DBT, Harm Reduction    Progress toward short term goals:Progress as expected,   She utilized self-calming techniques identified in session to work with interpersonal altercation: saying old Orthodox prayers, Netflix movies.  Felt she was able to keep from Etoh abuse by using the skills.   Attending [facility] and is working on 4th step with her peer recovery group. Abstaining from substance use. Reduced work days and felt that this was helpful to her mood.      Review of long term goals: Not done at " today's visit and Date of last review 1/14/2020    Diagnosis:   1. Bipolar affective disorder, currently depressed, moderate (H)    2. Generalized anxiety disorder    3. Borderline personality disorder (H)    4. Alcohol dependence in early, early partial, sustained full, or sustained partial remission (H)    5. Moderate tetrahydrocannabinol (THC) dependence in early remission (H)        Plan and Follow up: Patient will return for follow-up in 2 weeks. She will cut out and make into cards the DBT flashcards given by writer, choose two skills to bring back to session.  Goals continued from last session: Patient will utilize self calming techniques as identified in session to work with interpersonal altercation or difficulties.  She will utilize cognitive strategies to challenge negative thoughts that come up in the context of interpersonal difficulties.  She will adhere to safety plan to call crisis numbers or present to ED if she has impulses to hurt herself or another.  She will continue attendance at recovery support and AA and abstain from substance abuse.    Discharge Criteria/Planning: Client has chronic symptoms and ongoing therapy for maintenance stability recommended.    Jacquelyn Amanda MSW, LICSW  2/18/2020

## 2021-06-06 NOTE — PROGRESS NOTES
Called patient at home earlier this evening to offer tele-medicine for her appt tomorrow.  Voicemail left to inform patient that writer will call her and that she can cancel if she wishes not to do tele-health.  Jacquelyn Amanda, GIANFRANCOSW

## 2021-06-06 NOTE — PROGRESS NOTES
"Mental Health Visit Note    3/3/2020    Start time: 4:11pm   Stop Time: 4:56pm Session # 4    Session Type: Patient is presenting for an Individual session.   Persons present include patient and therapist.    Taylor Bryan is a 45 y.o. female is being seen today for   Chief Complaint   Patient presents with     MH Follow Up     Depression     low mood with interpersonal stress     Anxiety     anxiety with interpersonal stress     Alcohol Problem     in early remission   .     New symptoms or complaints: None    Functional Impairment:   Personal: 3  Family: 3  Work: 2-3  Social: 3    Clinical assessment of mental status: Patient's grooming is Disheveled, attire is Appropriate, and age appears Appears Stated. Behavior towards examiner is Cooperative, motor activity is Within normal, and eye contact is Variable contact.  Patient's mood is Depressed and anxious, and affect is Congruent w/content of speech, Anxious and Depressed.  Speech/language is Dramatic, attention is Within normal, and concentration is Within normal. Thought process is Within normal, thought content is Within noraml and  Within normal.  Patient's orientation is X 3, memory is  No Evidence of Impairment, judgement is Impairment and Minimal, and estimated intelligence is Average. Demonstrated insight is Adequate while fund of knowledge is adequate.    Suicidal/Homicidal Ideation present: None Reported This Session    Patient's impression of their current status:    \"When I get stressed I take everyone personally.\"  \"I'd really like to see you every week.\"  Patient reports mood is depressed and anxious.  Patient reports moderately severe depressive symptoms including little interest or pleasure in doing things, depressed mood, sleeping too much, poor energy, overeating, feeling bad about herself.  She denies suicidal ideation, plan, or intent.  Reports \"severe\" symptoms of anxiety including feeling nervous and on edge, not being able to control or " "stop her worry, worrying too much about different things, difficulty relaxing, restlessness, irritability, and feeling afraid that something awful might happen.    Processes her difficulty getting along with others, taking what others do personally.  Processes her financial stress with [employer], discusses her desire for subsidized housing.   She'd like to get an [employer] worker to help solve day to day problems but says she does not qualify for MA at this time.    Patient reports mixed follow-up with therapy homework: Did not yet prepare DBT flashcards given by writer.  Utilize self calming techniques as discussed in session 3 times per week including sensory grounding, watching her TV show, taking a timeout.  States she had a more difficult time utilizing cognitive strategies discussed in session to challenge negative thoughts that came up in the context of interpersonal difficulties.  Remains committed to safety plan to call crisis numbers or present to ED if she has impulses to hurt herself or another.  Continues attendance at  recovery support and abstains from substance use.    Therapist impression of patients current state: Ongoing mood issues in the context of interpersonal stress and difficulty regulating her emotions.  Some question about reliability of assessment scores indicating severe anxiety and depression: suspect that patient may be emphasizing her overall distress level (\"extremely difficult) given that she is holding down PT job, but she is emphatic about the her difficulties.    Processed negative cognitions, reinforced strengths, validated patient efforts and feelings.  Discussed personal boundaries in interpersonal relationships and the value of  remembering that others statements and actions are about them and not about her.  Brainstormed issues around financial stress.  Provided referral to [employer] for exploration of subsidized housing application.  Encourage patient to get " "on  list to increase her options.    Patient brought in MH assessments given on 2/18 for MH treatment plan.  Initially she said scores were from that day (entered for 2/18) then indicates they were more recent (entered for today).  She also indicates that both depression and anxiety sx make it \"Extremely difficult\" to do her work, take care of things at home, or get along with other people.    Scores include: KADEN 7: Total Score 21 and PHQ9 :  PHQ-9 Total Score: 17  , [place] Suicide Rating Scale    Type of psychotherapeutic technique provided: Client centered, Solution-focused, CBT and DBT, Harm Reduction    Progress toward short term goals:Progress as expected,   Did not yet prepare DBT flashcards given by writer.  Utilize self calming techniques as discussed in session 3 times per week including sensory grounding, watching her TV show, taking a timeout.  States she had a more difficult time utilizing cognitive strategies discussed in session to challenge negative thoughts that came up in the context of interpersonal difficulties.  Remains committed to safety plan to call crisis numbers or present to ED if she has impulses to hurt herself or another.  Continues attendance at  recovery support and abstains from substance use.    Review of long term goals: Not done at today's visit and Date of last review 2/18/2019    Diagnosis:   1. Bipolar affective disorder, currently depressed, moderate (H)    2. Generalized anxiety disorder    3. Borderline personality disorder (H)    4. Alcohol dependence in early, early partial, sustained full, or sustained partial remission (H)    5. Moderate tetrahydrocannabinol (THC) dependence in early remission (H)        Plan and Follow up: Patient will return for follow-up in 2 weeks. Homework continued from last session: She will cut out and make into cards the DBT flashcards given by writer, choose two skills to bring back to session. Patient will utilize self calming techniques as " identified in session to work with interpersonal altercation or difficulties.  She will utilize cognitive strategies to challenge negative thoughts that come up in the context of interpersonal difficulties.  She will adhere to safety plan to call crisis numbers or present to ED if she has impulses to hurt herself or another.  She will continue attendance at recovery support and AA and abstain from substance abuse.    Discharge Criteria/Planning: Client has chronic symptoms and ongoing therapy for maintenance stability recommended.    Jacquelyn Amanda MSW, LICSW  3/3/2020

## 2021-06-06 NOTE — PROGRESS NOTES
Outpatient Mental Health Treatment Plan    Name:  Taylor Bryan  :  1975  MRN:  [id number]    Treatment Plan:  Initial Treatment Plan 2020  Intake/initial treatment plan date:  10/8/2019  Benefit and risks and alternatives have been discussed: Yes  Is this treatment appropriate with minimal intrusion/restrictions: Yes  Estimated duration of treatment:  Approximately 10+ sessions.  Anticipated frequency of services:  Every 2 weeks  Necessity for frequency: This frequency is needed to establish therapeutic goals and for continuity of care in order to monitor progress.  Necessity for treatment: To address cognitive, behavioral, and/or emotional barriers in order to work toward goals and to improve quality of life.    Session Type: Patient is presenting for an Individual session.    Plan:      ? Depression    Goal:  Decrease average depression level from 6 to 3.   Strategies:    ?[x] Decrease social isolation     [x] Increase involvement in meaningful activities     ?[x] Discuss sleep hygiene     ?[x] Explore thoughts and expectations about self and others     ?[] Process grief (loss of significant person, independence, role,        etc.)     ?[x] Assess for suicide risk     ?[x] Implement physical activity routine (with physician approval)     [x] Consider introduction of bibliotherapy and/or videos     [x] Continue compliance with medical treatment plan (or explore         barriers)       ?  ?Degree to which this is a problem: 3  Degree to which goal is met: !  Date of Review: 2020            ?   ? Anxiety    Goal:  Decrease average anxiety level from 7 to 3.   Strategies: ? [x]Learn and practice relaxation techniques and other coping        strategies (e.g., thought stopping, reframing, meditation)     ? [x] Increase involvement in meaningful activities     ? [x] Discuss sleep hygiene     ? [x] Explore thoughts and expectations about self and others     ? [x] Identify and monitor triggers for  panic/anxiety symptoms     ? [x] Implement physical activity routine (with physician approval)     ? [x] Consider introduction of bibliotherapy and/or videos     ? [x] Continue compliance with medical treatment plan (or explore          barriers)                                       []     Degree to which this is a problem: 3  Degree to which goal is met: 1  Date of Review: 2/18/2020     Substance use  Goal:  To be 100% free of substance abuse. [Sober 33 days, following 60 days and a single slip.]   Strategies: ? [] Consider referral for chemical dependency evaluation     ? [x] Discuss barriers to participating in AA or other peer-facilitated           groups         [x] Address environmental factors which may interfere with                                                    sobriety     ? [x] Explore short-term versus long-term consequences of use     ? [x] Continue compliance with medical treatment plan (or explore          barriers)     ?  Degree to which this is a problem: 3  Degree to which goal is met: 1  Date of Review: 2/18/2020         Functional Impairment:  1=Not at all/Rarely  2=Some days  3=Most Days  4=Every Day    Personal : 3  Family : 3  Social : 3   Work/school : 4    Diagnosis:  Bipolar I, depressed versus Major Depressive Disorder, moderate to severe  Generalized anxiety Disorder  Social anxiety disorder  Borderline Personality Disorder in partial remission  Alcohol Dependency in early remission  THC dependency in early remission    WHODAS 2.0 12-item version: Self-administered: 69%       Scores presented in qualifiers to represent level of disability.  SEVERE Problem (high, extreme, ...) 50-95%     H1= 30  H2= 30  H3= 30    Clinical assessments and measures completed:. KADEN-7 and PHQ-9, [place] Suicide assessment scale     Strengths:  Intelligence, gutsy, tenacious, survivor, kind, knows how to be considerate.  Limitations:  Addiction, dependence on others, laziness, lack of motivation, lack of  "worth ethic.  Cultural Considerations: Patient raised in a middle class family where Catholicism, \"purity\" and rules to live by. Patient raised and now identifies as a \"Shinto witch\" or a \"Shinto douglass\".  Higher power is \"gender neutral Universe\".    Persons responsible for this plan: Patient and Provider            Psychotherapist Signature           Patient Signature:              Guardian Signature             Provider: Performed and documented by JOSE Balderas   Date:  2/18/2020    "

## 2021-06-07 NOTE — PROGRESS NOTES
"Mental Health tele Visit Note    Patient: Taylor Bryan    : 1975 MRN: [id number]    Date: 2020   Start time: 4:18pm   Stop Time: 5:03pm   Session # 10    The patient has been notified of the following:   \"We have found that certain health care needs can be provided without the need for a face to face visit.  This service lets us provide the care you need with a phone conversation.  I will have full access to your [facility] medical record during this entire phone call.   I will be taking notes for your medical record. Since this is like an office visit, we will bill your insurance company for this service.  There are potential benefits and risks of telephone visits (e.g. limits to patient confidentiality) that differ from in-person visits.?  Confidentiality still applies for telephone services, and nobody will record the visit.  It is important to be in a quiet, private space that is free of distractions (including cell phone or other devices) during the visit.?? If during the course of the call I believe a telephone visit is not appropriate, you will not be charged for this service\"  Session Type: Patient is participating in a telemedicine phone visit. (Attempted video visit, patient unable to access ranjana)  Chief Complaint   Patient presents with      Follow Up     Telephone visit     Anxiety     Anxiety, stress in the context of multiple stressors     Depression     low mood in the context of multiple stressors     Alcohol Problem     alcohol dependency in early remission       New symptoms or complaints: None reported    Functional Impairment:   Personal: 3  Family: 3  Work: 4  Social:3          ASSESSMENT: Current Emotional / Mental Status (status of significant symptoms):              Risk status (Self / Other harm or suicidal ideation)              Patient reports efforts at maintaining personal safety through social distance and hand hygiene.              Patient denies current or recent " "suicidal ideation or behaviors.              Patient denies current or recent homicidal ideation or behaviors.              Patient denies current or recent self injurious behavior or ideation.              Patient reports other safety concerns including Impulses to drink.              Patient reports no change in risk factors.              Patient reports no change in protective factors.              Recommended that patient call 911 or go to the local ED should there be a change in any of these risk factors.                Attitude:                                   Cooperative               Orientation:                             Oriented x3.              Speech                          Rate / Production:       Normal                           Volume:                       Normal               Mood:                                      Anxious  Depressed  Irritable               Thought Content:                    Clear               Thought Form:                        Coherent  Logical               Insight:                                     Fair       Patient's impression of their current status:   \"I'm going to lose it when I don't have her support anymore.\"  Patient reports mood as depressed and anxious.  Denies suicidality.  Processes financial stress: she is getting at least $200/month from her mother just to make rent and expenses, knows that this won't always be available to her.   Patient continues to struggle with required isolation in the context of stay at home in order with coronavirus public health crisis.    Therapist impression of patients current state: This 45-year-old  female presenting again with complaints of depression and anxiety in the context of multiple psychosocial stressors.   Borderline personality disorder and alcohol dependency/impulses to drink put her at increased risk for impulsive behavior especially with history of suicide attempts when she is intoxicated.  He is " "agreeable to safety plan to call 911 or present to hospital emergency room should she have impulses to harm herself.  She also states understanding of increased risk with current public health crisis.  Patient would she shows some willingness to look at the way she participates in some of her issues.  Decompensate without regular therapy sessions to support the use of coping and emotional regulation skills.  Open and cooperative in session.    Processed negative cognitions, reinforced strengths, validated patient efforts and feelings.  Offered empathic listening and reflections and questions intended to evoke change talk, explored needs and resources, and provided emotional support.   Discussed DBT skills she has learned in therapy, and which ones seem most relavent to her durrent problems. Patient identifies \"opposite to emotions\", \"mindfulness\" as two skills she'd like to use, and we discussed ways to apply these to current stressors. Problem solved financial issues: patient identifies pending $270/mo on [employer] drinks and another $100 to $200 on cigarettes. Admits this re-enforces her desire to quite smoking (renny with public health crisis), and identifies other beverages she might drink that could take the place of expensive energy drinks.    Type of psychotherapeutic technique provided: Client centered, Solution-focused, CBT and Motivational Interviewing    Progress toward short term goals: Mixed progress: Patient has not yet called my chart helpline for help getting MyChart set up.  Did not further explore possible applications for subsidized housing.  Did identify to DBT skills to bring back to session with coaching.  Using cognitive strategies to challenge negative thoughts when they come up.  Remains committed to safety plan to call crisis numbers for support or present to ED or call 911 if she has impulses to harm herself or another.  She continues attendance at recovery support AA meetings and has " abstained from substance abuse.    Review of long term goals: Not done at today's visit, last review 2/18/2020.     Diagnosis:  1. Bipolar affective disorder, currently depressed, moderate (H)    2. Generalized anxiety disorder    3. Borderline personality disorder (H)    4. Alcohol dependence in early, early partial, sustained full, or sustained partial remission (H)    5. Moderate tetrahydrocannabinol (THC) dependence in early remission (H)        Plan and Follow up: Patient will return for follow-up in 2 weeks. Goals continued from last session: Patient will call My Chart helpline tomorrow for help getting MyChart set up. She will explore [employer] for possible application to subsidized housing.  She will choose two DBT skills to bring back to session for discussion and application.  She will utilize self calming techniques to work with situational stress or interpersonal altercation or difficulties.  Utilize cognitive strategies to challenge negative thoughts that come up for her.  She will adhere to safety plan to call crisis numbers for support or present to ED if she has impulses to hurt herself or another.  Continue attendance at recovery support AA meetings and abstain from substance abuse.    Discharge Criteria/Planning: Client has chronic symptoms and ongoing therapy for maintenance stability recommended.      I have reviewed the note as documented above.  This accurately captures the substance of my conversation with the patient.  As the provider I attest to compliance with applicable laws and regulations related to telemedicine.  Jacquelyn Amanda, [facility] HospitalSW  4/28/2020

## 2021-06-07 NOTE — PROGRESS NOTES
"Mental Health tele Visit Note    Patient: Taylor Bryan    : 1975 MRN: [id number]    Date: 2020   Start time: 4:11   Stop Time: 4:57   Session # 9    The patient has been notified of the following:   \"We have found that certain health care needs can be provided without the need for a face to face visit.  This service lets us provide the care you need with a phone conversation.  I will have full access to your [facility] medical record during this entire phone call.   I will be taking notes for your medical record. Since this is like an office visit, we will bill your insurance company for this service.  There are potential benefits and risks of telephone visits (e.g. limits to patient confidentiality) that differ from in-person visits.?  Confidentiality still applies for telephone services, and nobody will record the visit.  It is important to be in a quiet, private space that is free of distractions (including cell phone or other devices) during the visit.?? If during the course of the call I believe a telephone visit is not appropriate, you will not be charged for this service\"  Session Type: Patient is participating in a telemedicine phone visit. (Attempted video visit, unable to access ranjana)  Chief Complaint   Patient presents with      Follow Up     Anxiety     anxiety in the midst of COVID public health crisis, worry about parents, difficulty with technology     Depression     low mood with social isolation in the midst of COVID health crisis, anxiety     Alcohol Problem     in early remission     Addiction     thc dependence in early remission       New symptoms or complaints: None reported    Functional Impairment:   Personal: 3  Family: 3  Work: 4  Social:3          ASSESSMENT: Current Emotional / Mental Status (status of significant symptoms):              Risk status (Self / Other harm or suicidal ideation)              Patient reports efforts at maintaining personal safety through social " "distance and hand hygiene.              Patient denies current or recent suicidal ideation or behaviors.              Patient denies current or recent homicidal ideation or behaviors.              Patient denies current or recent self injurious behavior or ideation.              Patient reports other safety concerns including Impulses to drink.              Patient reports no change in risk factors.              Patient reports no change in protective factors.              Recommended that patient call 911 or go to the local ED should there be a change in any of these risk factors.                Attitude:                                   Cooperative               Orientation:                             Oriented x3.              Speech                          Rate / Production:       Normal                           Volume:                       Normal               Mood:                                      Anxious  Depressed  Irritable               Thought Content:                    Clear               Thought Form:                        Coherent  Logical               Insight:                                     Fair       Patient's impression of their current status:   \"I'm spending too much time alone.\"  \"I have the urge to get drunk.\"  Patient reports mood is depressed and anxious.  She is struggling with the amount of isolation with stay at home order in the context of COVID public health crisis.  She is working about 15 hours/week from home.  She will be attending staff meeting via on-line meeting.  Remains worried about her aging mother and her own financial dependence on her mother.    Therapist impression of patients current state: This 45-year-old  female presents with complaints of depression, anxiety, irritability in the context of multiple psychosocial stressors.  Borderline personality disorder and alcohol dependency/impulses to drink put her at increased risk for impulsive behavior " "especially with history of suicide threats when she is intoxicated. She is agreeable to safety plan to call 911 or present to hospital emergency room and also cites understanding increased risk with current public health crisis. Patient would decompensate without regular therapy sessions to support the use of coping and emotional regulation skills.  Open and cooperative in session.    Processed negative cognitions, reinforced strengths, validated patient efforts and feelings.Offered empathic listening and reflections and questions intended to evoke change talk, explored needs and resources, and provided emotional support. Patient identifies desire to stay safe and well. Discussed the ways that substance abuse could go wrong: alcohol abuse could bring more risk by lack of inhibitions and possibility of involuntary hospital admission, etc even if she doesn't \"mean it\".    Type of psychotherapeutic technique provided: Client centered, Solution-focused, CBT and Motivational Interviewing    Progress toward short term goals: Mixed progress: She did not explore housing 1 for possible applications to subsidized housing.  She did utilize self calming techniques as identified in session to work with situational stressors including sensory grounding exercises.  Trying to challenge negative thinking utilizing cognitive methods discussed, states she reminded herself \"this will go on forever\".  Remains committed to safety plan to call crisis numbers or present to ED if she has impulses to hurt herself or someone else.  Using Zoom to attend AA meetings, average of 4-5 per week since last session.  Has been sober from alcohol and THC x20 days.   Pt discussing concerns and coping strategies during tele-sessions.    Review of long term goals: Not done at today's visit     Diagnosis:  1. Bipolar affective disorder, currently depressed, moderate (H)    2. Generalized anxiety disorder    3. Borderline personality disorder (H)    4. " Alcohol dependence in early, early partial, sustained full, or sustained partial remission (H)    5. Moderate tetrahydrocannabinol (THC) dependence in early remission (H)        Plan and Follow up: Patient will return for follow-up in 2 weeks.  Patient will call My Chart helpline tomorrow for help getting MyChart set up. She will explore housing [employer] for possible application to subsidized housing.  She will choose two DBT skills to bring back to session for discussion and application.  She will utilize self calming techniques to work with situational stress or interpersonal altercation or difficulties.  Utilize cognitive strategies to challenge negative thoughts that come up for her.  She will adhere to safety plan to call crisis numbers for support or present to ED if she has impulses to hurt herself or another.  Continue attendance at recovery support AA meetings and abstain from substance abuse.    Discharge Criteria/Planning: Client has chronic symptoms and ongoing therapy for maintenance stability recommended.      I have reviewed the note as documented above.  This accurately captures the substance of my conversation with the patient.  As the provider I attest to compliance with applicable laws and regulations related to telemedicine.  Jacquelyn Amanda, LICSW  4/14/2020

## 2021-06-07 NOTE — PROGRESS NOTES
"Telephone Visit Note    Patient Name: Taylor Bryan                   Date:  3/31/2020                                          Service Type:            Telephone Visit                The patient has been notified of the following:      \"We have found that certain health care needs can be provided without the need for a face to face visit.  This service lets us provide the care you need with a phone conversation.       I will have full access to your [place] medical record during this entire phone call.   I will be taking notes for your medical record.      Since this is like an office visit, we will bill your insurance company for this service.       There are potential benefits and risks of telephone visits (e.g. limits to patient confidentiality) that differ from in-person visits.?  Confidentiality still applies for telephone services, and nobody will record the visit.  It is important to be in a quiet, private space that is free of distractions (including cell phone or other devices) during the visit.??      If during the course of the call I believe a telephone visit is not appropriate, you will not be charged for this service\"     Consent has been obtained for this service by care team member: Yes                Session Start Time:  3:15am                   Session End Time:   4pm                Session Length:        45 minutes    Session #:      7    Attendees:     Patient, therapist                DATA                            Progress Since Last Session (Related to Symptoms / Goals / Homework):              Symptoms: Depression, anxiety and feelings of panic in the context of multiple psychosocial stressors.  Difficulty with motivation.    Patient reports mixed follow-up with therapy homework: States she is still not prepared her DBT flashcards as discussed although she states she still wants to do it.  She is used diaphragmatic breathing and sensory grounding to calm herself when feeling panicky.  " States she had no interpersonal altercations because she is quite isolated.  Remains committed to safety plan to call crisis numbers or present to the emergency room if she has impulses to hurt herself or another person.  She is connecting with her peers and recovery support and abstaining from substance abuse.                             Episode of Care Goals: Patient is called at home in order to support patient, assess for safety, provide tools for symptom reduction, strategies to reduce stress.                  Current / Ongoing Stressors and Concerns:             Patient reports acute anxiety in the context of COVID public health crisis.  Not being able to see her mother upsetting to her and she is feeling financially vulnerable if something were to happen to her mother.  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  States she relapsed on alcohol 6 days ago when she had trouble taking the isolation.  Denies risky behavior in the community she drank at home along.                Intervention:                Processed negative cognitions, reinforced strengths, validated patient efforts and feelings.  Discussed the stressors in her life that she is and is not in control of.  Patient given referral to housing [employer]org to explore what properties might be taking applications for subsidized housing.  Discussed riskiness of alcohol abuse given that it is an disinhibitor and could lead to poor judgment and lack of protective behavior.                              ASSESSMENT: Current Emotional / Mental Status (status of significant symptoms):              Risk status (Self / Other harm or suicidal ideation)              Patient relapsed on alcohol and acknowledges this could be risk in context of pandemic.              Patient denies current or recent suicidal ideation or behaviors.              Patient denies current or recent  homicidal ideation or behaviors.              Patient denies current or recent self injurious behavior or ideation.              Patient denies other safety concerns.              Patient change in risk factors.              Patient is change in protective factors.              Recommended that patient call 911 or go to the local ED should there be a change in any of these risk factors.                Attitude:                                   Cooperative               Orientation:                             Oriented x3.              Speech                          Rate / Production:       Normal                           Volume:                       Normal               Mood:                                      Anxious  Depressed  Irritable               Thought Content:                    Clear               Thought Form:                        Coherent  Logical               Insight:                                     Good                 Medication Compliance:             Patient admits she is not always consistent with her medications but takes them as prescribed most of the time.  That she can ask her provider for medication to help with impulses to drink.                Changes in Health Issues:             No health changes reported.                Chemical Use Review:              Substance Use: Relapsed on alcohol.                Tobacco Use: Patient is a smoker.    Diagnosis:  1. Bipolar affective disorder, currently depressed, moderate (H)    2. Generalized anxiety disorder    3. Borderline personality disorder (H)    4. Alcohol abuse    5. Moderate tetrahydrocannabinol (THC) dependence in early remission (H)          PLAN: (Patient Tasks / Therapist Tasks / Other)  Will return in 2 weeks for follow-up session.  She will explore [email or web address] for possible application to subsidized housing.  Homework continued from last session: She will cut out and make into cards the DBT flashcards given by  writer, choose two skills to bring back to session. Patient will utilize self calming techniques as identified in session to work with interpersonal altercation or difficulties.  She will utilize cognitive strategies to challenge negative thoughts that come up in the context of interpersonal difficulties.  She will adhere to safety plan to call crisis numbers or present to ED if she has impulses to hurt herself or another.  She will continue attendance at recovery support and AA and abstain from substance abuse.                I have reviewed the note as documented above.  This accurately captures the substance of my conversation with the patient.  Jacquelyn Amanda, [facility] 3/31/2020

## 2021-06-07 NOTE — PROGRESS NOTES
"Telephone Visit Note    Patient Name: Taylor Bryan                   Date: 3/26/2020                                            Service Type:            Telephone Visit                The patient has been notified of following:     \"This telephone visit will be conducted via a call between you and your provider. We have found that certain health care needs can be provided without the need for an office visit.  This service lets us provide the care you need with a short phone conversation.    If during the course of the call the provider feels a telephone visit is not appropriate, you will not be charged for this service.\"                 Session Start Time:  1:16pm                   Session End Time:   2:01                Session Length:        45 minutes    Session #:      6    Attendees:     Patient and therapist                 DATA                            Progress Since Last Session (Related to Symptoms / Goals / Homework):              Symptoms: Patient reports anxiety, panic, low mood, boredom, worries in the context of multiple psychosocial stressors.    She reports mixed follow-up with therapy homework: She utilize self calming techniques as practiced in session including sensory grounding and diaphragmatic breathing 3 times in a week since last session.  \"I am not sure if it helped, but I kept going\".  She utilized cognitive strategies as practiced in session to challenge negative thoughts that came up in the context of feeling worried about her mother.  Patient connected with her recovery peers and AA and abstain from substance abuse.  She remains committed to safety plan to call crisis numbers or present to ED if she has impulses to harm herself.  She did not prepare DBT cards as arranged or bring to skills back to session to discuss.                Episode of Care Goals: Patient is called at home in order to support patient, assess for safety, provide tools for symptom reduction, strategies to " reduce stress.                Current / Ongoing Stressors and Concerns:             Patient processes low mood around feeling isolated and afraid in the context of COVID public health crisis.  She continues to worry about her mother who is aged and who patient feels is at high risk. She has impulses to drink but has not broken her sobriety since last session.  Discusses feeling bored, anxious, sometimes panicky.  She is realized she is quite dependent on her mother financially and is afraid of having to function by herself.                Intervention:              Processed negative cognitions, reinforced strengths, validated patient efforts and feelings.  Discussed think she has control of in her life and things that she does not.  Discussed the value of focusing on things that she can manage on her own.  Problem solved financial issues and identified community resources such as a food shelf to help reduce financial pressure.                            ASSESSMENT: Current Emotional / Mental Status (status of significant symptoms):              Risk status (Self / Other harm or suicidal ideation)              Patient reports efforts and community safety including social distancing and hand hygiene.              Patient denies current or recent suicidal ideation or behaviors.              Patient denies current or recent homicidal ideation or behaviors.              Patient denies current or recent self injurious behavior or ideation.              Patient denies other safety concerns.              Patient denies change in risk factors.              Patient change and protective factors.              Recommended that patient call 911 or go to the local ED should there be a change in any of these risk factors.                Attitude:                                   Cooperative               Orientation:                             X3.              Speech                          Rate / Production:       Normal   Pressured                           Volume:                       Loud               Mood:                                      Anxious  Depressed  Irritable               Thought Content:                    Clear               Thought Form:                        Coherent  Logical               Insight:                                     Good                 Medication Compliance:             Ports compliance with medications.                Changes in Health Issues:             No reported health issues.                Chemical Use Review:              Substance Use: None since last session, recent alcohol relapse.                Tobacco Use: Yes.    Diagnosis:  1. Bipolar affective disorder, currently depressed, moderate (H)    2. Generalized anxiety disorder    3. Borderline personality disorder (H)    4. Alcohol abuse          PLAN: (Patient Tasks / Therapist Tasks / Other)  Patient will return for follow-up in 1 to 2 weeks.  Therapy homework continued from last session: She will cut out and make into cards the DBT flashcards given by writer, choose two skills to bring back to session. Patient will utilize self calming techniques as identified in session to work with interpersonal altercation or difficulties.  She will utilize cognitive strategies to challenge negative thoughts that come up in the context of interpersonal difficulties.  She will adhere to safety plan to call crisis numbers or present to ED if she has impulses to hurt herself or another.  She will continue attendance at recovery support and AA and abstain from substance abuse.                I have reviewed the note as documented above.  This accurately captures the substance of my conversation with the patient.  Jacquelyn Amanda, [facility] 3/26/2020

## 2021-06-08 NOTE — PROGRESS NOTES
"Mental Health Visit Note    Patient: Taylor Bryan    : 1975 MRN: [id number]    2020    Start time: 4:15pm    Stop Time: 4:59pm   Session # 11    Session Type: Patient is presenting for an Individual session.    Taylor Bryan is a 45 y.o. female is being seen today for    Chief Complaint   Patient presents with     MH Follow Up     Anxiety     anxiety in the context of isolation, coronavirus public health threat,      Depression     low mood with isolation, overwhelmed by \"problems\"     Addiction     early remission from Etoh and car, financial issues   .     Telemedicine Visit: The patient's condition can be safely assessed and treated via synchronous audio and visual telemedicine encounter.      Reason for Telemedicine Visit: Services only offered telehealth    Originating Site (Patient Location): Patient's other patient car    Distant Site (Provider Location): [facility]: [facility]    Consent:  The patient/guardian has verbally consented to: the potential risks and benefits of telemedicine (video visit) versus in person care; bill my insurance or make self-payment for services provided; and responsibility for payment of non-covered services.     Mode of Communication:  Video Conference via [employer]    As the provider I attest to compliance with applicable laws and regulations related to telemedicine.    Those present for this visit: patient, therapist    Follow up in regards to ongoing symptom management of anxiety, depression, addiction    New symptoms or complaints: None    Functional Impairment:   Personal: 3  Family: 2  Social: 3  Work: 3    Clinical assessment of mental status:   Taylor Bryan presented on time.   She was oriented x3, open and cooperative, and dressed appropriately for this session and weather. Her memory was Normal cognitive functioning .  Her speech was  Within normal.  Language was fluent.  Concentration and focus is Within normal. " "Psychosis is not noted or reported. She reports her mood is Irritable, Anxious and Depressed.  Affect is congruent with speech and is Irritable, Anxious and Depressed.  Fund of knowledge is adequate. Insight is adequate for therapy.    Suicidal/Homicidal Ideation present: Patient denies suicidal and homicidal ideations/means or plans.     Patient's impression of their current status:  \"I'm so tense.\"  Patient reports mood as anxious and depressed.  She sometimes feels panicky.  Processes multiple psychosocial stressors, including financial stress with needed car repairs, worry about her mother, distress with social isolation in the context of coronavirus public health crisis.    Therapist impression of patients current state: This 45 y.o. White or  female presents with acute anxiety and depression in the context of multiple psychosocial stressors.   Financial dependency as well as compulsive addictive behavior (energy drinks) all in the context of social isolation exacerbates symptoms.  Patient would decompensate without regular therapy sessions to support the use of coping and emotional regulation skills.  She remains open and cooperative in session.    Processed negative cognitions, reinforced strengths, validated patient efforts and feelings. Discussed value of not catastrophizing her situation. Offered empathic listening and reflections and questions intended to evoke change talk, explored needs and resources, and provided emotional support.   Discussed that she may be forced to change spending habits if she does not change them on her own.  Problem-solved and brainstormed options to reduce financial stress.  Patient identifies she will buy Citlaly and interval that with coffee so that she isn't buying expensive energy drinks.  Discussed focusing her energy on things she has control over: her own behavior and spending, sobriety, seeking out social or on-line emotional support.  Discussed again the " "importance of My Chart so that scales and appts can be pushed out, patient is agreeable to completing.    Assessment tools used today include:  Plan to push out KADEN-7, PHQ-9, for next treatment plan, pending My Chart.    Type of psychotherapeutic technique provided: Insight oriented, Client centered, Solution-focused and Motivational interviewing    Progress toward short term goals: Mixed progress:   Patient has not yet signed up for My Chart as discussed.  \"I just forgot\". She did explore Housing Link to explore options for application to subsidized housing. She utilized self-soothing strategies x2 when she felt panicked: deep breathing, sensory grounding. \"It can help some\".  Utilized cognitive strategies to challenge worst thoughts x3. Remains committed to call crisis numbers or present to ED should she have impulses to harm herself.  She is attending AA by Zoom 4-5x/week, sober from alcohol or THC abuse.    Review of long term goals: Not done at today's visit and Date of last review 2/18/20 .     Diagnosis:   1. Bipolar affective disorder, currently depressed, moderate (H)    2. Generalized anxiety disorder    3. Borderline personality disorder (H)    4. Alcohol dependence in early, early partial, sustained full, or sustained partial remission (H)    5. Moderate tetrahydrocannabinol (THC) dependence in early remission (H)     **worsened with stress    Plan and Follow-up: Patient will return in two weeks for follow up. Homework continued from last session: Patient will call My Chart helpline tomorrow for help getting MyChart set up. She will explore housing Link for possible application to subsidized housing.  She will choose two DBT skills to bring back to session for discussion and application.  She will utilize self calming techniques to work with situational stress or interpersonal altercation or difficulties.  Utilize cognitive strategies to challenge negative thoughts that come up for her.  She will adhere " to safety plan to call crisis numbers for support or present to ED if she has impulses to hurt herself or another.  Continue attendance at recovery support AA meetings and abstain from substance abuse.    Discharge Criteria/Planning: Client has chronic symptoms and ongoing therapy for maintenance stability recommended.    I have reviewed the note as documented above.  This accurately captures the substance of my conversation with the patient.    As the provider I attest to compliance with applicable laws and regulations related to telemedicine.  Performed and documented by Jacquelyn Amanda [facility], 5/12/2020

## 2021-06-08 NOTE — PROGRESS NOTES
"Mental Health Visit Note    Patient: Taylor Bryan    : 1975 MRN: [id number]    2020    Start time: 4:12pm    Stop Time: 4:58pm   Session # 12    Session Type: Patient is presenting for an Individual session.    Taylor Bryan is a 45 y.o. female is being seen today for    Chief Complaint   Patient presents with      Follow Up     Anxiety     \"out of control\" anxiety with financial worries, withdrawal symptoms with caffiene     Depression     depression is lessened since last session with managing $ better     Alcohol Problem     in early remission     Addiction     sober from THC, efforts at reducing caffiene   .     Telemedicine Visit: The patient's condition can be safely assessed and treated via synchronous audio and visual telemedicine encounter.      Reason for Telemedicine Visit: Services only offered telehealth    Originating Site (Patient Location): Patient's other patient car    Distant Site (Provider Location): [facility]: [facility]    Consent:  The patient/guardian has verbally consented to: the potential risks and benefits of telemedicine (video visit) versus in person care; bill my insurance or make self-payment for services provided; and responsibility for payment of non-covered services.     Mode of Communication:  Video Conference via [employer]    As the provider I attest to compliance with applicable laws and regulations related to telemedicine.    Those present for this visit: patient, therapist    Follow up in regards to ongoing symptom management of anxiety, depression, addiction    New symptoms or complaints: Panic attacks, most likely from caffiene withdrawal    Functional Impairment:   Personal: 3  Family: 2  Social: 3  Work: 3    Clinical assessment of mental status:   Taylor Bryan presented on time.   She was oriented x3, open and cooperative, and dressed appropriately for this session and weather. Her memory was Normal cognitive functioning " ".  Her speech was  Within normal.  Language was fluent.  Concentration and focus is Within normal. Psychosis is not noted or reported. She reports her mood is Irritable, Anxious and Depressed.  Affect is congruent with speech and is Irritable, Anxious and Depressed.  Fund of knowledge is adequate. Insight is adequate for therapy.    Suicidal/Homicidal Ideation present: Patient denies suicidal and homicidal ideations/means or plans.     Patient's impression of their current status:  \"I'm having panic attacks.  I don't know if I can take them.\"  \"It's hard to deal with my clients who are having a hard time.\"  Patient reports mood is anxious with panic attacks.  She's gone from four energy drinks to one per day. Psychiatrist has told her that panic attacks are most likely from caffiene withdrawal.   Patient processes stressors at work: her job at [employer] has become more stressful with people in the community getting more stressed with ongoing pandemic.    Therapist impression of patients current state: 45-year-old  female presents with acute anxiety, panic attacks, and depression in the context of multiple psychosocial stressors.  She is made considerable effort at reducing her overall stress by cutting down on energy drinks to which she was quite addicted.  Suspect withdrawal from caffeine contributes to current distress and will moderate over time if she is able to stick with it.  Financial dependency as well as compulsive addictive behavior in the context of social isolation due to pandemic exacerbates symptoms.  Patient would decompensate without regular therapy sessions to support the use of coping and emotional regulation skills.  She remains open and cooperative in session.    Processed negative cognitions, reinforced strengths, validated patient efforts and feelings. Offered empathic listening and reflections and questions intended to evoke change talk, explored needs and resources, and " provided emotional support.  Reviewed how patient has been able to make use of therapy skills to make better choices around her spending and caffeine consumption.  Discussed how she will be able to increase her independence and decrease financial dependency on her mother thereby reducing overall anxiety.  Discussed the value of turning focus away from catastrophize in her situation and reflecting on positives and accomplishments.  Discussed again the importance of establishing my chart so that skills and appointments can be pushed out patient agrees to complete.    Assessment tools used today include:  Patient completed MH assessments for the purposes of treatment planning  including: KADEN 7: Total Score: 20, PHQ9 :  PHQ-9 Total Score: 13   and CAGE :  Score: __/4: 1  , [place] Suicide Rating Scale, short    Type of psychotherapeutic technique provided: Insight oriented, Client centered, Solution-focused and Motivational interviewing    Progress toward short term goals: Mixed progress:  Patient purchased alternatives for the monster drinks she had been buying and felt this was helpful overall.  She did not establish my chart but is amenable to calling the my chart helpline.  Has not yet explored housing Link for possible application to subsidized housing.  Utilize self calming techniques to work with situational stress.  Utilized cognitive strategies to challenge negative thoughts.  Remains committed to safety plan to call crisis numbers for support or present to ED should she have impulses to herself or another.  Continues attendance at recovery support AA meetings and abstains from substance abuse including reducing caffeine intake.    Review of long term goals: Not done at today's visit and Date of last review 5/18/20 .     Diagnosis:   1. Bipolar affective disorder, currently depressed, moderate (H)    2. Generalized anxiety disorder    3. Borderline personality disorder (H)    4. Alcohol dependence in early,  early partial, sustained full, or sustained partial remission (H)    5. Moderate tetrahydrocannabinol (THC) dependence in early remission (H)     **worsened with withdrawal from Monster Drinks    Plan and Follow-up: Patient will return in two weeks for follow up. Homework continued from last session: Patient will call My Chart helpline tomorrow for help getting MyChart set up. She will explore housing Link for possible application to subsidized housing.  She will choose two DBT skills to bring back to session for discussion and application.  She will utilize self calming techniques to work with situational stress or interpersonal altercation or difficulties.  Utilize cognitive strategies to challenge negative thoughts that come up for her.  She will adhere to safety plan to call crisis numbers for support or present to ED if she has impulses to hurt herself or another.  Continue attendance at recovery support AA meetings and abstain from substance abuse.    Discharge Criteria/Planning: Client has chronic symptoms and ongoing therapy for maintenance stability recommended.    I have reviewed the note as documented above.  This accurately captures the substance of my conversation with the patient.    As the provider I attest to compliance with applicable laws and regulations related to telemedicine.  Performed and documented by Jacquelyn Amanda [facility], 5/26/2020

## 2021-06-08 NOTE — PROGRESS NOTES
Outpatient Mental Health Treatment Plan    Name:  Taylor Bryan  :  1975  MRN:  [id number]    Treatment Plan:  Updated Treatment Plan 2020  Intake/initial treatment plan date:  10/8/2019  Benefit and risks and alternatives have been discussed: Yes  Is this treatment appropriate with minimal intrusion/restrictions: Yes  Estimated duration of treatment:  Approximately 10+ sessions.  Anticipated frequency of services:  Every 2 weeks  Necessity for frequency: This frequency is needed to establish therapeutic goals and for continuity of care in order to monitor progress.  Necessity for treatment: To address cognitive, behavioral, and/or emotional barriers in order to work toward goals and to improve quality of life.    Session Type: Patient is presenting for an Individual session.    Plan:      ? Depression    Goal:  Decrease average depression level from 6 to 3. [Depression worsened due to coronavirus public health crisis and associated isolation and stressors.]   Strategies:    ?[x] Decrease social isolation     [x] Increase involvement in meaningful activities     ?[x] Discuss sleep hygiene     ?[x] Explore thoughts and expectations about self and others     ?[] Process grief (loss of significant person, independence, role,        etc.)     ?[x] Assess for suicide risk     ?[x] Implement physical activity routine (with physician approval)     [x] Consider introduction of bibliotherapy and/or videos     [x] Continue compliance with medical treatment plan (or explore         barriers)       ?  ?Degree to which this is a problem: 3  Degree to which goal is met: !  Date of Review: 2020            ?   ? Anxiety    Goal:  Decrease average anxiety level from 7 to 3. [Patient states symptoms are worsened due to coronavirus public health crisis and associated stressors. She has occasional panic episodes. Financial stress and dependency continue to be a significant source of stress.]   Strategies: ? [x]Learn  and practice relaxation techniques and other coping        strategies (e.g., thought stopping, reframing, meditation)     ? [x] Increase involvement in meaningful activities     ? [x] Discuss sleep hygiene     ? [x] Explore thoughts and expectations about self and others     ? [x] Identify and monitor triggers for panic/anxiety symptoms     ? [x] Implement physical activity routine (with physician approval)     ? [x] Consider introduction of bibliotherapy and/or videos     ? [x] Continue compliance with medical treatment plan (or explore          barriers)                                       []     Degree to which this is a problem: 3  Degree to which goal is met: 1  Date of Review: 5/18/2020     Substance use  Goal:  To be 100% free of substance abuse. [Sober 33 days, following 60 days and a single slip.]   Strategies: ? [] Consider referral for chemical dependency evaluation     ? [x] Discuss barriers to participating in AA or other peer-facilitated           groups         [x] Address environmental factors which may interfere with                                                    sobriety     ? [x] Explore short-term versus long-term consequences of use     ? [x] Continue compliance with medical treatment plan (or explore          barriers)     ?  Degree to which this is a problem: 3  Degree to which goal is met: 1  Date of Review: 5/18/2020         Functional Impairment:  1=Not at all/Rarely  2=Some days  3=Most Days  4=Every Day    Personal : 3  Family : 3  Social : 3   Work/school : 4    Diagnosis:  Bipolar I, depressed versus Major Depressive Disorder, moderate to severe  Generalized anxiety Disorder  Social anxiety disorder  Borderline Personality Disorder in partial remission  Alcohol Dependency in early remission  THC dependency in early remission    WHODAS 2.0 12-item version: Self-administered: 69%       Scores presented in qualifiers to represent level of disability.  SEVERE Problem (high, extreme,  "...) 50-95%     H1= 30  H2= 30  H3= 30    Clinical assessments and measures completed:. KADEN-7 and PHQ-9, [place] Suicide assessment scale     Strengths:  Intelligence, gutsy, tenacious, survivor, kind, knows how to be considerate.  Limitations:  Addiction, dependence on others, laziness, lack of motivation, lack of worth ethic.  Cultural Considerations: Patient raised in a middle class family where Catholicism, \"purity\" and rules to live by. Patient raised and now identifies as a \"Gnosticism witch\" or a \"Gnosticism douglass\".  Higher power is \"gender neutral Universe\".    Persons responsible for this plan: Patient and Provider            Psychotherapist Signature           Patient Signature:              Guardian Signature             Provider: Performed and documented by Jacquelyn Amanda [facility]   Date:  5/18/2020  "
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
## 2021-06-09 NOTE — PROGRESS NOTES
"Mental Health Visit Note    Patient: Taylor Bryan    : 1975 MRN: [id number]    2020    Start time: 4:25pm    Stop Time: 5:10pm   Session # 14    Session Type: Patient is presenting for an Individual session.    Taylor Bryan is a 45 y.o. female is being seen today for    Chief Complaint   Patient presents with     MH Follow Up     Video visit via Doximity     Anxiety     anxiety with housing insecurity/interpersonal conflict     Depression     low mood with feeling \"stuck in a bad situation\" at sober housing     Alcohol Problem     early remission     Addiction     early remission   .     Telemedicine Visit: The patient's condition can be safely assessed and treated via synchronous audio and visual telemedicine encounter.      Reason for Telemedicine Visit: Services only offered telehealth    Originating Site (Patient Location): Patient's other patient car    Distant Site (Provider Location): [facility]: [facility]    Consent:  The patient/guardian has verbally consented to: the potential risks and benefits of telemedicine (video visit) versus in person care; bill my insurance or make self-payment for services provided; and responsibility for payment of non-covered services.     Mode of Communication:  Video Conference via [employer]elli (unable to access [employer])    As the provider I attest to compliance with applicable laws and regulations related to telemedicine.    Those present for this visit: patient, therapist    Follow up in regards to ongoing symptom management of anxiety, depression, addiction    New symptoms or complaints: Anxiety, anger, irritabiilty with interpersonal issues in residence    Functional Impairment:   Personal: 4  Family: 2  Social: 3  Work: 2    Clinical assessment of mental status: [Same MS as 2020]  Taylor Bryan presented on time.   She was oriented x3, open and cooperative, and dressed appropriately for this session and weather. Her memory " "was Normal cognitive functioning .  Her speech was  Within normal.  Language was fluent.  Concentration and focus is Within normal. Psychosis is not noted or reported. She reports her mood is Irritable, Anxious and Depressed.  Affect is congruent with speech and is Irritable, Anxious and Depressed.  Fund of knowledge is adequate. Insight is adequate for therapy.    Suicidal/Homicidal Ideation present: Patient denies suicidal and homicidal ideations/means or plans.     Patient's impression of their current status:  \"I am afraid I am going to blow it and get kicked out of my housing.\"  Patient reports mood is anxious, irritable, depressed in the context of interpersonal stress and worries about housing.  Interpersonal stress in her sober house continues but is somewhat lessened following patient talking to the manager and agreeing she and person with whom she had conflict would not interact.  Processed with her difficulty managing day-to-day with needing to keep a distance from housemate with whom she shared a kitchen.      Therapist impression of patients current state: This 45-year-old  female presents for outpatient video psychotherapy session with complaints of irritability, anxiety, depression.  Symptoms are chronic in the context of borderline personality disorder and patient would decompensate without regular therapy sessions to support the use of coping and emotional regulation skills.  She is however coping without emergency mental health intervention in the emergency room which she has required on other occasions.  Open and cooperative in session.    Processed negative cognitions, reinforced strengths, validated patient efforts and feelings. Offered empathic listening and reflections and questions intended to evoke change talk, explored needs and resources, and provided emotional support.  Conducted values exercise and brainstormed pros and cons of doing what she needs to do to stay at sober living " situation.  Patient identifies wanting to locate alternative housing before she would be required to move.  Admits she is managing stressors appropriately and without external consequences.  Admits that her largest risk would be if she decided to drink and inhibitions were not restrained resulting in episode where she might have significant legal consequences or threat to her housing.    Assessment tools used today include:  None today    Type of psychotherapeutic technique provided: Client centered, Solution-focused, CBT and Motivational interviewing    Progress toward short term goals:Progress as expected,   Patient identified DBT skills to utilize to create space between her behavior and reaction to a person with whom she is having difficulty.  Conducted self check to name feelings and give space for them in order to not act out on them.  Utilized self calming techniques to work with interpersonal stress and difficulties including counting, walking away, choosing not to speak.  She has not had much initiative to explore rentals within her meetings but identifies desire to do so.  She is attending AA recovery meetings at least 2 times per week and is abstaining from alcohol abuse in the context of structured support at the sober residence.    Review of long term goals: Not done at today's visit and Date of last review 5/18/20 .     Diagnosis:   1. Bipolar affective disorder, currently depressed, moderate (H)    2. Generalized anxiety disorder    3. Borderline personality disorder (H)    4. Alcohol dependence in early, early partial, sustained full, or sustained partial remission (H)    5. Moderate tetrahydrocannabinol (THC) dependence in early remission (H)     **worsened with withdrawal from [employer]    Plan and Follow-up: Patient will return in two weeks for follow up.  Homework from last session 6/9/20 continued in the context of acute distress and finding identified skills helpful:   She will access her  DBT skills for creating space between herself and person she is having issues with.  She will conduct self-check to name her feelings, give space for them, so that she is not suppressing feelings that she might otherwise act out on.  She will continue to utilize self calming techniques to work with interpersonal stress and difficulties. She will explore rentals that are within her means to support (so that she is not financially dependent on her mother again).   She will tend recovery support via AA meetings at least 2 times per week and abstain from substance abuse.    Discharge Criteria/Planning: Client has chronic symptoms and ongoing therapy for maintenance stability recommended.    I have reviewed the note as documented above.  This accurately captures the substance of my conversation with the patient.    As the provider I attest to compliance with applicable laws and regulations related to telemedicine.  Performed and documented by Jacquelyn Amanda [facility], 6/30/2020
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
## 2021-06-10 NOTE — PROGRESS NOTES
Mental Health Visit Note    Patient: Taylor Bryan    : 1975 MRN: [id number]    2020    Start time: 4:13pm    Stop Time: 4:55  Session # 16    Session Type: Patient is presenting for an Individual session.    Taylor Bryan is a 45 y.o. female is being seen today for    Chief Complaint   Patient presents with     MH Follow Up     Video visit     Anxiety     anxiety with worries about mother, housing,      Depression     low mood in the context of interpersonal difficulties, housing issues     Alcohol Problem     early remission     Addiction     early remission   .     Telemedicine Visit: The patient's condition can be safely assessed and treated via synchronous audio and visual telemedicine encounter.      Reason for Telemedicine Visit: Services only offered telehealth    Originating Site (Patient Location): Patient's home    Distant Site (Provider Location): Provider Remote Setting- Home Office    Consent:  The patient/guardian has verbally consented to: the potential risks and benefits of telemedicine (video visit) versus in person care; bill my insurance or make self-payment for services provided; and responsibility for payment of non-covered services.     Mode of Communication:  Video Conference via [employer]    As the provider I attest to compliance with applicable laws and regulations related to telemedicine.    Those present for this visit Katherin Arthel Zayda    Follow up in regards to ongoing symptom management of anxiety and depression    New symptoms or complaints: Patient presents to therapy with continued  symptoms associated with anxiety and depression specifically related to difficulties with her housemate relationships and her living situation and responses to the COVID-19 pandemic and subsequent social restrictions and social isolation.  Patient's thoughts feelings responses and coping strategies were processed at length    Functional Impairment:   Personal:  3  Family: 3  Social: 3  Work: 3    Clinical assessment of mental status:   Taylor Bryan presented on time.   She was oriented x3, open and cooperative, and dressed appropriately for this session and weather. Her memory was Normal cognitive functioning .  Her speech was  Pressured.  Language was logical and coherent.  Concentration and focus is Within normal. Psychosis is not noted or reported. She reports her mood is Congruent w/content of speech.  Affect is congruent with speech and is Congruent w/content of speech.  Fund of knowledge is adequate. Insight is adequate for therapy.    Suicidal/Homicidal Ideation present: Patient denies suicidal and homicidal ideations/means or plans.     Patient's impression of their current status: Patient states he continues to be difficult for me where I live and I am trying to utilize the DBT skills and it is hard.  Sometimes I just feel like I have so much pushing against me that I can feel pretty exhausted.  Relationships are really hard for me    Therapist impression of patients current state: This 45 y.o. White or  female presents with anxiety specifically related to relationship issues connected to her housing, issues related to early recovery with an alcohol problem, depression related to difficulty with interpersonal relationships and anger about the difficulty with interpersonal issues.  She continues to struggle at today's session with sense of boundaries between self and others and continues to struggle to implement DBT techniques and interventions to assist her in having a better sense of self in relationship to others.  She is currently fairly impacted by the thoughts feelings and behaviors of others and has a hard time advocating for herself and we continue to work on this each week in therapy progress is slow.    Assessment tools used not today.    Type of psychotherapeutic technique provided: Insight oriented, Client centered, Solution-focused, CBT  and DBT techniques and interventions.    Progress toward short term goals:Progress as expected, Patient continues to work with DBT interventions and continues to identify skills that she has learned historically and those that are reviewed in session.  She continues to struggle with unrealistic expectations of herself and others and we continue to address this.  Progress is slow.    Review of long term goals: Not done at today's visit . Date of last review 5/18/2020.   Diagnosis:   1. Generalized anxiety disorder    2. Bipolar affective disorder, currently depressed, moderate (H)    3. Borderline personality disorder (H)    4. Alcohol dependence in early, early partial, sustained full, or sustained partial remission (H)    5. Moderate tetrahydrocannabinol (THC) dependence in early remission (H)      no change    Plan and Follow-up: Patient plans to continue reading recovery materials related to 12-step recovery program.. Patient plans to continue taking the medication as prescribed. Patient plans to follow up with therapy intwo weeks.  She continues to utilize DBT interventions and the report successes and failures at next session    Discharge Criteria/Planning: Client has chronic symptoms and ongoing therapy for maintenance stability recommended. and Patient will continue with follow-up until therapy can be discontinued without return of signs and symptoms.    I have reviewed the note as documented above.  This accurately captures the substance of my conversation with the patient.    As the provider I attest to compliance with applicable laws and regulations related to telemedicine.  Performed and documented by Jacquelyn GARCIA  7/21/2020

## 2021-06-10 NOTE — PROGRESS NOTES
Mental Health Visit Note    Patient: Taylor Bryan    : 1975 MRN: [id number]    2020    Start time: 4:03pm    Stop Time: 4:50  Session # 17    Session Type: Patient is presenting for an Individual session.    Taylor Bryan is a 45 y.o. female is being seen today for   Chief Complaint   Patient presents with     MH Follow Up     Video Visit     Anxiety     relational issues     Depression     low mood related to interpersonal stress     Addiction     in early remission from THC dependency     Alcohol Problem     in early remission   .     Telemedicine Visit: The patient's condition can be safely assessed and treated via synchronous audio and visual telemedicine encounter.      Reason for Telemedicine Visit: Services only offered telehealth    Originating Site (Patient Location): Patient's home    Distant Site (Provider Location): Provider Remote Setting- Home Office    Consent:  The patient/guardian has verbally consented to: the potential risks and benefits of telemedicine (video visit) versus in person care; bill my insurance or make self-payment for services provided; and responsibility for payment of non-covered services.     Mode of Communication:  Video Conference via [employer]    As the provider I attest to compliance with applicable laws and regulations related to telemedicine.    Those present for this visit Taylor Art    Follow up in regards to ongoing symptom management of anxiety and depression    New symptoms or complaints: none    Functional Impairment:   Personal: 3  Family: 3  Social: 3  Work: 3    Clinical assessment of mental status:   Taylor Bryan presented on time.   She was oriented x3, open and cooperative, and dressed appropriately for this session and weather. Her memory was Normal cognitive functioning .  Her speech was  Pressured.  Language was logical and coherent.  Concentration and focus is Within normal. Psychosis is not noted or reported. She  "reports her mood is Congruent w/content of speech.  Affect is congruent with speech and is Congruent w/content of speech.  Fund of knowledge is adequate. Insight is adequate for therapy.    Suicidal/Homicidal Ideation present: Patient denies suicidal and homicidal ideations/means or plans.     Patient's impression of their current status: Patient indicates she has some relief from anxiety by being able to process fears in session.    Therapist impression of patients current state:   Patient processed stress around $30,000 of medical debt.  She is working with  to do bankruptcy planned for November when she has paid for it, making regular payments.   She is using Monster drinks again, and for this patient, is a clear sign she is struggling to cope.  Financial dependency on her aging mother for this extra expense creates stress.    Patient continues to work with  DBT interventions and continues to identify skills that she has learned historically and those that are reviewed in session.  She continues to struggle with unrealistic expectations of herself and others and we continue to address this.  Progress is slow.    Various attempts to use CBT to address financial anxiety did not move the patient.  Active listening to acknowledge patients fear resulted in some reduction of anxiety.     Assessment tools used: not today    Type of psychotherapeutic technique provided: Insight oriented, Client centered, Solution-focused, CBT and DBT techniques and interventions.    Progress toward short term goals:Progress as expected,  attended AA and reviewed 12 step materials. Reviewed DBT interventions, including wise mind, mindfulness - states these were helpful \"to not relapse or do anything stupid\", but did not help with rising dependency on Monster drinks again. Taking medications as prescribed by psychiatry.     Review of long term goals: Not done at today's visit . Date of last review 5/18/2020.   Diagnosis:   1. " Generalized anxiety disorder    2. Bipolar affective disorder, currently depressed, moderate (H)    3. Borderline personality disorder (H)    4. Alcohol dependence in early, early partial, sustained full, or sustained partial remission (H)    5. Moderate tetrahydrocannabinol (THC) dependence in early remission (H)      no change    Plan and Follow-up: Patient will return for follow up in two weeks. Will Follow all MD recommendations. She will focus on using DBT skills for increased anxiety around financial issues. She will utilize 12 step supports to manage cravings for Etoh, THC, Monster drinks, acknowledge the ways that these don't help her move towards goals of emotional and financial stability.    Discharge Criteria/Planning: Client has chronic symptoms and ongoing therapy for maintenance stability recommended. and Patient will continue with follow-up until therapy can be discontinued without return of signs and symptoms.    I have reviewed the note as documented above.  This accurately captures the substance of my conversation with the patient.    As the provider I attest to compliance with applicable laws and regulations related to telemedicine.  Performed and documented by Jacquelyn Amanda [facility]  8/4/2020

## 2021-06-10 NOTE — PROGRESS NOTES
Outpatient Mental Health Treatment Plan    Name:  Taylor Bryan  :  1975  MRN:  [id number]    Treatment Plan:  Updated Treatment Plan 2020  Intake/initial treatment plan date:  10/8/2019  Benefit and risks and alternatives have been discussed: Yes  Is this treatment appropriate with minimal intrusion/restrictions: Yes  Estimated duration of treatment:  Approximately 10+ sessions.  Anticipated frequency of services:  Every 2 weeks  Necessity for frequency: This frequency is needed to establish therapeutic goals and for continuity of care in order to monitor progress.  Necessity for treatment: To address cognitive, behavioral, and/or emotional barriers in order to work toward goals and to improve quality of life.    Session Type: Patient is presenting for an Individual session.    Plan:      ? Depression    Goal:  Decrease average depression level from 6 to 3. [Depression worsened due to coronavirus public health crisis and associated isolation and stressors.]   Strategies:    ?[x] Decrease social isolation     [x] Increase involvement in meaningful activities     ?[x] Discuss sleep hygiene     ?[x] Explore thoughts and expectations about self and others     ?[] Process grief (loss of significant person, independence, role,        etc.)     ?[x] Assess for suicide risk     ?[x] Implement physical activity routine (with physician approval)     [x] Consider introduction of bibliotherapy and/or videos     [x] Continue compliance with medical treatment plan (or explore         barriers)       ?  ?Degree to which this is a problem: 3  Degree to which goal is met: !  Date of Review: 2020            ?   ? Anxiety    Goal:  Decrease average anxiety level from 7 to 3. [Patient states symptoms are worsened due to coronavirus public health crisis and associated stressors. She has occasional panic episodes. Financial stress and dependency continue to be a significant source of stress.]   Strategies: ? [x]Learn  and practice relaxation techniques and other coping        strategies (e.g., thought stopping, reframing, meditation)     ? [x] Increase involvement in meaningful activities     ? [x] Discuss sleep hygiene     ? [x] Explore thoughts and expectations about self and others     ? [x] Identify and monitor triggers for panic/anxiety symptoms     ? [x] Implement physical activity routine (with physician approval)     ? [x] Consider introduction of bibliotherapy and/or videos     ? [x] Continue compliance with medical treatment plan (or explore          barriers)                                       []     Degree to which this is a problem: 3  Degree to which goal is met: 1  Date of Review: 8/27/2020     Substance use  Goal:  To be 100% free of substance abuse. [Sober 33 days, following 60 days and a single slip.]   Strategies: ? [] Consider referral for chemical dependency evaluation     ? [x] Discuss barriers to participating in AA or other peer-facilitated           groups         [x] Address environmental factors which may interfere with                                                    sobriety     ? [x] Explore short-term versus long-term consequences of use     ? [x] Continue compliance with medical treatment plan (or explore          barriers)     ?  Degree to which this is a problem: 3  Degree to which goal is met: 1  Date of Review: 8/27/2020         Functional Impairment:  1=Not at all/Rarely  2=Some days  3=Most Days  4=Every Day    Personal : 3  Family : 3  Social : 3   Work/school : 4    Diagnosis:  Bipolar I, depressed versus Major Depressive Disorder, moderate to severe  Generalized anxiety Disorder  Social anxiety disorder  Borderline Personality Disorder in partial remission  Alcohol Dependency in early remission  THC dependency in early remission    WHODAS 2.0 12-item version: Self-administered: 69%       Scores presented in qualifiers to represent level of disability.  SEVERE Problem (high, extreme,  "...) 50-95%     H1= 30  H2= 30  H3= 30    Clinical assessments and measures completed:. KADEN-7 and PHQ-9, Anderson Suicide assessment scale     Strengths:  Intelligence, gutsy, tenacious, survivor, kind, knows how to be considerate.  Limitations:  Addiction, dependence on others, laziness, lack of motivation, lack of worth ethic.  Cultural Considerations: Patient raised in a middle class family where Catholicism, \"purity\" and rules to live by. Patient raised and now identifies as a \"[employer] witch\" or a \"[employer] douglass\".  Higher power is \"gender neutral [employer]\".    Persons responsible for this plan: Patient and Provider            Psychotherapist Signature           Patient Signature:              Guardian Signature             Provider: Performed and documented by Jacquelyn Amanda [facility]   Date:  8/27/2020  "

## 2021-06-11 NOTE — PROGRESS NOTES
Mental Health Visit Note     Patient: Taylor Bryan    : 1975 MRN: [id number]    2020    Start time: 3:07pm    Stop Time: 3:52pm  Session # 20    Session Type: Patient is presenting for an Individual session.    Taylor Bryan is a 45 y.o. female is being seen today for   Chief Complaint   Patient presents with     MH Follow Up     Video visit     Anxiety     anxiety with mood dysregulation     Depression     low mood with difficulty managing emotions     Anger     Anger with interpersonal triggers   .     Telemedicine Visit: The patient's condition can be safely assessed and treated via synchronous audio and visual telemedicine encounter.      Reason for Telemedicine Visit: Services only offered telehealth    Originating Site (Patient Location): Patient's home    Distant Site (Provider Location): Provider Remote Setting- Home Office    Consent:  The patient/guardian has verbally consented to: the potential risks and benefits of telemedicine (video visit) versus in person care; bill my insurance or make self-payment for services provided; and responsibility for payment of non-covered services.     Mode of Communication:  Video Conference via My Chart Amwell    As the provider I attest to compliance with applicable laws and regulations related to telemedicine.    Those present for this visit Katherin Arthel Zayda    Follow up in regards to ongoing symptom management of anxiety and depression    New symptoms or complaints: none    Functional Impairment:   Personal: 3  Family: 2  Social: 2  Work: 3    Clinical assessment of mental status:   Taylor Bryan presented on time.   She was oriented x3, open and cooperative, and dressed appropriately for this session and weather. Her memory was Normal cognitive functioning .  Her speech was  Pressured.  Language was logical and coherent.  Concentration and focus is Within normal. Psychosis is not noted or reported. She reports her mood is  "Irritable, Anxious and Depressed.  Affect is congruent with speech and is Irritable, Anxious and Depressed.  Fund of knowledge is adequate. Insight is adequate for therapy.    Suicidal/Homicidal Ideation present: Patient endorses passive SI since last session.  \"Sometimes I just want to die, I don't want to experience the emotions anymore.\"  Denies plan or intent.      Patient's impression of their current status:  Patient identifies mood as angry, anxious, depressed.  \"A lot of it is that I really don't being uncomfortable.\"  \"My borderline personality is acting up.\"  \"I just get so overwhelmed with emotions.\"  \"I don't like soothing myself. I don't think I deserve it.  It doesn't feel right\".  \"It makes me feel very uncomfortable.\"        Therapist impression of patients current state:   This 45 year old  female with long history of anxiety, depression, borderline personality issues presents for telemedicine psychotherapy video visit.    Patient is open and cooperative in session and able to process feelings and stressors, consider and work on skill building.  Patient would decompensate without regular therapy sessions to support the use of coping and emotional regulation skills.   Processed negative cognitions, reinforced strengths, validated patient efforts and feelings. Spent time on identifying triggers around her emotional dysregulation. Patient able to identify not being heard, being disrespected, feeling like I'm about to get in trouble \"like when my room is messy\".  Conducted feeling identification exercise, patient able to identify anxiety, anger, sadness.    Discussed how emotions were handled in her formative years.  Patient states there was not easy space for emotions \"I was so frightened I would just freeze.\"  \"I would play victim or try to get them to do something for me.\"  Discussed the ways that a \"DBT tune up\" might be useful to patient.  Discussed mental health systems DBT IOP program or " "group.  Discussed also the possibility of utilizing DBT workbook.    Assessment tools used: not today     Type of psychotherapeutic technique provided: Insight oriented, Client centered, Solution-focused, CBT and DBT techniques and interventions.    Progress toward short term goals: Mixed Progress: Patient has not yet called S clinic to explore adding DBT group to her support and treatment.  Discusses utilizing \"wise mind\" for trying to deal with difficult situation and sober house.  Able to manage her own behavior without consequences.  She continues to use 12-step supports to manage cravings for alcohol and THC as well as monster drinks.  Acknowledges the ways that substance abuse would move her out side of her goals of emotional and financial stability.    Review of long term goals: Not done at today's visit . Date of last review 8/27/20. Verbally reviewed and approved by patient.    Diagnosis:   1. Generalized anxiety disorder    2. Bipolar affective disorder, currently depressed, moderate (H)    3. Borderline personality disorder (H)    4. Alcohol dependence in early, early partial, sustained full, or sustained partial remission (H)    5. Moderate tetrahydrocannabinol (THC) dependence in early remission (H)    **improved    Plan and Follow-up: Patient will return for follow up in two weeks.  She will pursue \"DBT tune up\" such as [facility] systems DBT IOP program or group.  She will practice developmentally age appropriate imaginative self-care when feeling triggered by strong emotions.  She will allow herself feelings and create space around them. She will contact [facility] [employer] to establish library card and ability to check out E-books.  She will remain sober from drugs and alcohol.    Plan to refer patient to Radical Acceptance book when she has [facility] card and technical link established.    Discharge Criteria/Planning: Client has chronic symptoms and ongoing therapy for maintenance " stability recommended. and Patient will continue with follow-up until therapy can be discontinued without return of signs and symptoms.    I have reviewed the note as documented above.  This accurately captures the substance of my conversation with the patient.    As the provider I attest to compliance with applicable laws and regulations related to telemedicine.  Performed and documented by Jacquelyn Amanda [facility]  9/24/2020
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
## 2021-06-12 NOTE — PROGRESS NOTES
Mental Health Visit Note     Patient: Taylor Bryan    : 1975 MRN: [id number]    10/13/2020    Start time: 2:11pm    Stop Time: 2:54pm  Session # 22    Session Type: Patient is presenting for an Individual session.    Taylor Bryan is a 45 y.o. female is being seen today for   Chief Complaint   Patient presents with     MH Follow Up     Video Vist     Anxiety     anxiety around new living situation,      Depression     low mood with stressors, living situation,      Addiction     in early remission     Alcohol Problem     in early remission   .     Telemedicine Visit: The patient's condition can be safely assessed and treated via synchronous audio and visual telemedicine encounter.      Reason for Telemedicine Visit: Services only offered telehealth    Originating Site (Patient Location): Patient's home    Distant Site (Provider Location): Provider Remote Setting- Home Office    Consent:  The patient/guardian has verbally consented to: the potential risks and benefits of telemedicine (video visit) versus in person care; bill my insurance or make self-payment for services provided; and responsibility for payment of non-covered services.     Mode of Communication:  Video Conference via D[employer]    As the provider I attest to compliance with applicable laws and regulations related to telemedicine.    Those present for this visit Jacquelyn GARCIA, Taylor Priest    Follow up in regards to ongoing symptom management of anxiety and depression    New symptoms or complaints: none    Functional Impairment:   Personal: 3  Family: 2  Social: 2  Work: 3    Clinical assessment of mental status:   Taylor Bryan presented on time.   She was oriented x3, open and cooperative, and dressed appropriately for this session and weather. Her memory was Normal cognitive functioning .  Her speech was  Pressured.  Language was logical and coherent.  Concentration and focus is Within normal. Psychosis is not noted or  "reported. She reports her mood is Irritable, Anxious and Depressed.  Affect is congruent with speech and is Irritable, Anxious and Depressed.  Fund of knowledge is adequate. Insight is adequate for therapy.    Suicidal/Homicidal Ideation present: Patient endorses passive SI since last session.  \"Sometimes I just want to die, I don't want to experience the emotions anymore.\"  Denies plan or intent.      Patient's impression of their current status:  Patient reports mood is anxious and depressed.  Patient complains of \"reving in my chest\"/tingling, heightened libido, poor sleep 3-4 hours/night \"even with 100 mg of Trazedone,   Has been off of mood stabilizer since March 2020, has psychiatry appt on Thursday.    Processes that she has had increasing \"panic\" around having to do chores in her new group home.  Insists that she has to have 2 full days off each week without any household tasks, errands, or work shifts.     Therapist impression of patients current state:   This 45 year old  female with long history of anxiety, depression, borderline personality issues presents for telemedicine psychotherapy video visit.    Patient is open and cooperative in session and able to process feelings and stressors, consider and work on skill building.  Patient would decompensate without regular therapy sessions to support the use of coping and emotional regulation skills.   Processed negative cognitions, reinforced strengths, validated patient efforts and feelings. Spent time on identifying triggers around her emotional dysregulation. Patient able to identify not being heard, being disrespected, feeling like I'm about to get in trouble \"like when my room is messy\".  Conducted feeling identification exercise, patient able to identify anxiety, anger, sadness.    Discussed how emotions were handled in her formative years.  Patient states there was not easy space for emotions \"I was so frightened I would just freeze.\"  \"I " "would play victim or try to get them to do something for me.\"  Discussed the ways that a \"DBT tune up\" might be useful to patient.  Discussed mental health systems DBT IOP program or group.  Discussed also the possibility of utilizing DBT workbook.    Assessment tools used: not today     Type of psychotherapeutic technique provided: Insight oriented, Client centered, Solution-focused, CBT and DBT techniques and interventions.    Progress toward short term goals: Mixed Progress: Patient has not yet called [facility] clinic to explore adding DBT group to her support and treatment.  Discusses utilizing \"wise mind\" for trying to deal with difficult situation and sober house.  Able to manage her own behavior without consequences.  She continues to use 12-step supports to manage cravings for alcohol and THC as well as monster drinks.  Acknowledges the ways that substance abuse would move her out side of her goals of emotional and financial stability.    Review of long term goals: Not done at today's visit . Date of last review 8/27/20. Verbally reviewed and approved by patient.    Diagnosis:   1. Generalized anxiety disorder    2. Bipolar affective disorder, currently depressed, moderate (H)    3. Borderline personality disorder (H)    4. Alcohol dependence in early, early partial, sustained full, or sustained partial remission (H)    5. Moderate tetrahydrocannabinol (THC) dependence in early remission (H)    **improved    Plan and Follow-up: Patient will return for follow up in two weeks.  She will pursue \"DBT tune up\" such as mental health systems DBT IOP program or group.  She will practice developmentally age appropriate imaginative self-care when feeling triggered by strong emotions.  She will allow herself feelings and create space around them. She will contact [employer] to establish library card and ability to check out E-books.  She will remain sober from drugs and alcohol.    Plan to refer patient to " [employer]ical Acceptance book when she has public [employer] card and technical link established.    Discharge Criteria/Planning: Client has chronic symptoms and ongoing therapy for maintenance stability recommended. and Patient will continue with follow-up until therapy can be discontinued without return of signs and symptoms.    I have reviewed the note as documented above.  This accurately captures the substance of my conversation with the patient.    As the provider I attest to compliance with applicable laws and regulations related to telemedicine.  Performed and documented by Jacquelyn Amanda [facility]  10/13/2020

## 2021-06-12 NOTE — PROGRESS NOTES
Mental Health Visit Note     Patient: Taylor Bryan    : 1975 MRN: [id number]    2020    Start time: 3:10pm    Stop Time: 3:56pm  Session # 24    Session Type: Patient is presenting for an Individual session.    Taylor Bryan is a 45 y.o. female is being seen today for   Chief Complaint   Patient presents with     MH Follow Up     Video Visit     Anxiety     anxiety in context [place] election, work, car probs     Depression     low mood in context [place] election, work, car probs     Anger     feeling rageful around frustrations   .     Telemedicine Visit: The patient's condition can be safely assessed and treated via synchronous audio and visual telemedicine encounter.      Reason for Telemedicine Visit: Services only offered telehealth    Originating Site (Patient Location): Patient's home    Distant Site (Provider Location): Provider Remote Setting- Home Office    Consent:  The patient/guardian has verbally consented to: the potential risks and benefits of telemedicine (video visit) versus in person care; bill my insurance or make self-payment for services provided; and responsibility for payment of non-covered services.     Mode of Communication:  Video Conference via [employer] My [employer]    As the provider I attest to compliance with applicable laws and regulations related to telemedicine.    Those present for this visit Jacquelyn GARCIA, Taylor Zayda    Follow up in regards to ongoing symptom management of anxiety and depression, anger and rage issues    New symptoms or complaints: None    Functional Impairment:   Personal: 3  Family: 2  Social: 2  Work: 3    Clinical assessment of mental status: [Same MS as 10/22/20]  Taylor Bryan presented on time.   She was oriented x3, open and cooperative, and dressed appropriately for this session and weather. Her memory was Normal cognitive functioning .  Her speech was  Pressured.  Language was logical and coherent.  Concentration and focus is Within  "normal. Psychosis is not noted or reported. She reports her mood is Irritable, Anxious and Depressed.  Affect is congruent with speech and is Irritable, Anxious and Depressed.  Fund of knowledge is adequate. Insight is adequate for therapy.    Suicidal/Homicidal Ideation present: Patient endorses passive SI since last session.  \"Sometimes I just want to die, I don't want to experience the emotions anymore.\"  Denies plan or intent.      Patient's impression of their current status:  \"I'm at the end of my rope, I just can't just it.\"   \"I felt angry, resentful, and pissed off.\"  \"I'm afraid they're going to take away my social security.\"  Patient reports mood is depressed, irritable, and fearful.  Processes thoughts and feelings around [place] election and the threats she feels to her financial security as a person with a chronic disability.  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 \"I want to quit but I can't afford it.\"    Therapist impression of patients current state:   This 45 year old  female with long history of anxiety, depression, borderline personality issues presents for telemedicine psychotherapy video visit. Patient is open and cooperative in session.  Processed negative cognitions, reinforced strengths, validated patient efforts and feelings.  Discussed mindfulness approaches to working with something she can't control like the election. Discussed putting focus on those things where she can activate on her own behalf.  Patient states she needs to find out how much to fix her car before she can go forward with decision making around the car. She identifies that she can begin to repair her credit via bankruptcy. Identifies that first step is going to print out and fax $ proofs, needs to go to the library to do that.     Assessment tools used: not today     Type of psychotherapeutic technique provided: Insight oriented, Client centered, Solution-focused, " CBT and DBT     Progress toward short term goals: Mixed Progress:  Patient states she did not make strength based lists as discussed.  Did not pursue DBT IOP program or group.  She does report efforts to allow herself feelings and to create space around him, and admits that she generally is able to stay out of trouble with others and not act on behavior that would have significant consequences.  She spoke with family about establishing a library card.  She remains sober from drugs and alcohol.    Review of long term goals: Not done at today's visit . Date of last review 8/27/20.     Diagnosis:   1. Generalized anxiety disorder    2. Bipolar affective disorder, currently depressed, moderate (H)    3. Borderline personality disorder (H)    4. Alcohol dependence in early, early partial, sustained full, or sustained partial remission (H)    **worsened with psychosocial stressors    Plan and Follow-up: Patient will return for follow up in one week (one additional appt added for de-escalation of current acute appts).  She will find out how much to fix her car before she can go forward with decision making around the car. She will continue efforts to begin to repair her credit via bankruptcy, conduct first step to print out and fax $ proofs, needs to go to the library to do that.  She will continue to allow herself her feelings and create space around them via utilizing discussed DBT techniques.  She will remain sober from drugs and alcohol.    Plan to refer patient to Radical Acceptance book when she has public library card and technical link established.    Discharge Criteria/Planning: Client has chronic symptoms and ongoing therapy for maintenance stability recommended. and Patient will continue with follow-up until therapy can be discontinued without return of signs and symptoms.    I have reviewed the note as documented above.  This accurately captures the substance of my conversation with the patient.    As the  provider I attest to compliance with applicable laws and regulations related to telemedicine.  Performed and documented by Jacquelyn Amanda [facility]  11/5/2020

## 2021-06-12 NOTE — PROGRESS NOTES
Mental Health Visit Note     Patient: Taylor Bryan    : 1975 MRN: [id number]    10/8/2020    Start time: 3:06pm    Stop Time: 3:52pm  Session # 21    Session Type: Patient is presenting for an Individual session.    Taylor Bryan is a 45 y.o. female is being seen today for   Chief Complaint   Patient presents with     MH Follow Up     Video visit     Anxiety     Anxiety in context of interpersonal stress with new sober      Depression     Low mood in the context of interpersonal stress with new sober      Alcohol Problem     in early remission     Addiction     in early remission from THC   .     Telemedicine Visit: The patient's condition can be safely assessed and treated via synchronous audio and visual telemedicine encounter.      Reason for Telemedicine Visit: Services only offered telehealth    Originating Site (Patient Location): Patient's home    Distant Site (Provider Location): Provider Remote Setting- Home Office    Consent:  The patient/guardian has verbally consented to: the potential risks and benefits of telemedicine (video visit) versus in person care; bill my insurance or make self-payment for services provided; and responsibility for payment of non-covered services.     Mode of Communication:  Video Conference via My Chart Amwell    As the provider I attest to compliance with applicable laws and regulations related to telemedicine.    Those present for this visit Jacquelyn GARCIA, Taylor Priest    Follow up in regards to ongoing symptom management of anxiety and depression    New symptoms or complaints: none    Functional Impairment:   Personal: 3  Family: 2  Social: 2  Work: 3    Clinical assessment of mental status:   Taylor Bryan presented on time.   She was oriented x3, open and cooperative, and dressed appropriately for this session and weather. Her memory was Normal cognitive functioning .  Her speech was  Pressured.  Language was logical and  "coherent.  Concentration and focus is Within normal. Psychosis is not noted or reported. She reports her mood is Irritable, Anxious and Depressed.  Affect is congruent with speech and is Irritable, Anxious and Depressed.  Fund of knowledge is adequate. Insight is adequate for therapy.    Suicidal/Homicidal Ideation present: Patient endorses occasional passive SI.  Denies plan or intent.  Admits this is \"escape\" thoughts from psychosocial stressors.     Patient's impression of their current status:  \"I've been very suicidal lately.\"  \"She's not even friendly!\"  Patient describes mood as depressed and anxious.  She utilizes session to process thoughts and feelings around her new sober living situation.  She feels that the manager is very unfriendly, and takes this personally.     Therapist impression of patients current state:   This 45 year old  female with long history of anxiety, depression, borderline personality presents for telemedicine psychotherapy video visit.    Patient is open and cooperative in session and able to process feelings and stressors, consider and work on skill building.  Patient would decompensate without regular therapy sessions to support the use of coping and emotional regulation skills.   Today patient is focused on stressors around her new sober living situation.  She does not feel supported in the relationship with new manager.  She admits taking personally when the manager is not \"friendly\".   Processed negative cognitions, reinforced strengths, validated patient efforts and feelings. Discussed that she is using the sober house for housing and not necessarily for friendship.  Encouraged patient to look for friendship in solid friendships and recovery meetings where she has received it before rather than her new situation.  Discussed utilizing DBT skills of wise man, distraction, taking space to better manage feelings that come up.     Assessment tools used: not today     Type of " "psychotherapeutic technique provided: Insight oriented, Client centered, Solution-focused, CBT and DBT techniques and interventions.    Progress toward short term goals: Mixed Progress:   She has not yet pursued DBT tune up such as [facility] DBT IOP program or group.  States \"Not really\" when asked if she offered imaginative self-care to her younger self when triggered by people or situations.  Has not contacted [facility] to establish library card and ability to check out E-books.  She does remain sober from drugs and alcohol.    Review of long term goals: Not done at today's visit . Date of last review 8/27/20. Verbally reviewed and approved by patient.    Diagnosis:   1. Generalized anxiety disorder    2. Bipolar affective disorder, currently depressed, moderate (H)    3. Borderline personality disorder (H)    4. Alcohol dependence in early, early partial, sustained full, or sustained partial remission (H)    5. Moderate tetrahydrocannabinol (THC) dependence in early remission (H)    **improved    Plan and Follow-up: Patient will return for follow up in two weeks. Goals continued from 9/24 for purposes of increasing support and stabilizing emotions when triggered:   She will pursue \"DBT tune up\" such as [facility] systems DBT IOP program or group.  She will practice developmentally age appropriate imaginative self-care when feeling triggered by strong emotions.  She will allow herself feelings and create space around them. She will contact [employer] to establish library card and ability to check out E-books.  She will remain sober from drugs and alcohol.    Plan to refer patient to [facility] Acceptance book when she has public [employer] card and technical link established.    Discharge Criteria/Planning: Client has chronic symptoms and ongoing therapy for maintenance stability recommended. and Patient will continue with follow-up until therapy can be discontinued without return of signs and symptoms.    I " have reviewed the note as documented above.  This accurately captures the substance of my conversation with the patient.    As the provider I attest to compliance with applicable laws and regulations related to telemedicine.  Performed and documented by Jacquelyn GARCIA  10/8/2020

## 2021-06-13 NOTE — PROGRESS NOTES
Mental Health Visit Note     Patient: Taylor Bryan    : 1975 MRN: [id number]    2020    Start time: 3:06pm   Stop Time: 3:52pm  Session # 26    Session Type: Patient is presenting for an Individual session.    Taylor Bryan is a 45 y.o. female is being seen today for   Chief Complaint   Patient presents with      Follow Up     video visit     Anxiety     anxiety with car issues, pandemic health risk, $ issues     Anger     anger around situational stressors     Depression     low mood with situational stressors, social isolation, work stress     Alcohol Problem     in early remission     Addiction     Thc dependence, in early remission   .     Telemedicine Visit: The patient's condition can be safely assessed and treated via synchronous audio and visual telemedicine encounter.      Reason for Telemedicine Visit: Services only offered telehealth    Originating Site (Patient Location): Patient's home    Distant Site (Provider Location): [facility]: [facility]    Consent:  The patient/guardian has verbally consented to: the potential risks and benefits of telemedicine (video visit) versus in person care; bill my insurance or make self-payment for services provided; and responsibility for payment of non-covered services.     Mode of Communication:  Video Conference via zLense My Chart then Doximity due to patient difficulty with connection.    As the provider I attest to compliance with applicable laws and regulations related to telemedicine.    Those present for this visit Taylor Art    Follow up in regards to ongoing symptom management of anxiety and depression, anger and rage issues    New symptoms or complaints: anxiety with financial stress, car repairs    Functional Impairment:   Personal: 3  Family: 2  Social: 2-3  Work: 3    Clinical assessment of mental status: [Same MS as 20]  Taylor Bryan presented on time.   She was oriented  "x3, open and cooperative, and dressed appropriately for this session and weather. Her memory was Normal cognitive functioning .  Her speech was  Pressured.  Language was logical and coherent.  Concentration and focus is Within normal. Psychosis is not noted or reported. She reports her mood is Irritable, Anxious and Depressed.  Affect is congruent with speech and is Irritable, Anxious and Depressed.  Fund of knowledge is adequate. Insight is adequate for therapy.    Suicidal/Homicidal Ideation present: Patient endorses passive SI since last session.  \"Sometimes I just want to die, I don't want to experience the emotions anymore.\"  Denies plan or intent.      Patient's impression of their current status:  \"My emotions are so strong!\"  \"I like being a victim, I like feeling sorry for myself.\"  \"I went 3 weeks without showering.\"  \"Most of my days, I have a blanket of anxiety and depression.\"  Patient reports mood is anxious and down in the midst of multiple psychosocial stressors.   She will meet with psychiatry today and intends to ask for a medication adjustment.    Therapist impression of patients current state:   This 45 year old  female with long history of anxiety, depression, borderline personality issues presents for telemedicine psychotherapy video visit. Patient is open and cooperative in session.  Patient would decompensate without regular therapy sessions to support the use of coping and emotional regulation skills.   Patient session to process thoughts and feelings around multiple and acute psychosocial stressors.  Processed negative cognitions, reinforced strengths, validated patient efforts and feelings.  Brainstormed options for self-care and self soothing.  Patient able to identify \"Tonight I'm just going to read, smoke, and watch TV.\"  Discussed using cognitive challenges to combat negative thinking.    Assessment tools used: not today     Type of psychotherapeutic technique provided: " "Insight oriented, Client centered, Solution-focused, CBT and DBT     Progress toward short term goals:Progress as expected,   Patient reports utilizing therapy journal to consider \"multiple triggers\" as regards her self-concept and her stressors, both positive and negative thoughts.  She continues to connect with AA friends and remain sober from alcohol and drugs.    Review of long term goals: Not done at today's visit . Date of last review 8/27/20.     Diagnosis:   1. Generalized anxiety disorder    2. Bipolar affective disorder, currently depressed, moderate (H)    3. Borderline personality disorder (H)    4. Alcohol dependence in early, early partial, sustained full, or sustained partial remission (H)    5. Moderate tetrahydrocannabinol (THC) dependence in early remission (H)    **worsened with psychosocial stressors    Plan and Follow-up: Patient will return for follow up in one week (one additional appt added for de-escalation of current acute appts).  Patient will continue journaling about \"multiple triggers\" both positive and negative thoughts around her situation and stressors as well as her self-concept.  She will continue to connect with AA friends and remain sober from alcohol and drugs.    Discharge Criteria/Planning: Client has chronic symptoms and ongoing therapy for maintenance stability recommended. and Patient will continue with follow-up until therapy can be discontinued without return of signs and symptoms.    I have reviewed the note as documented above.  This accurately captures the substance of my conversation with the patient.    As the provider I attest to compliance with applicable laws and regulations related to telemedicine.  Performed and documented by Jacquelyn GARCIA  11/19/2020  "

## 2021-06-13 NOTE — PROGRESS NOTES
Mental Health Visit Note     Patient: Taylor Bryan    : 1975 MRN: [id number]    2020    Start time: 3:02pm   Stop Time: 3:48pm  Session # 28    Session Type: Patient is presenting for an Individual session.    Taylor Bryan is a 45 y.o. female is being seen today for   Chief Complaint   Patient presents with     MH Follow Up     Video Visit     Anxiety     anxiety worsened with financial, legal, issues     Depression     mild low mood with interpersonal, financial, legal stressors     Alcohol Problem     in early remission    .     Telemedicine Visit: The patient's condition can be safely assessed and treated via synchronous audio and visual telemedicine encounter.      Reason for Telemedicine Visit: Services only offered telehealth    Originating Site (Patient Location): Patient's home    Distant Site (Provider Location): Provider Remote Setting    Consent:  The patient/guardian has verbally consented to: the potential risks and benefits of telemedicine (video visit) versus in person care; bill my insurance or make self-payment for services provided; and responsibility for payment of non-covered services.     Mode of Communication:  Video Conference via Amwell    As the provider I attest to compliance with applicable laws and regulations related to telemedicine.    Those present for this visit Taylor Art    Follow up in regards to ongoing symptom management of anxiety and depression, anger and rage issues    New symptoms or complaints: None    Functional Impairment:   Personal: 3  Family: 2  Social: 2-3  Work: 3    Clinical assessment of mental status:   Taylor Bryan presented on time.   She was oriented x3, open and cooperative, and dressed appropriately for this session and weather. Her memory was Normal cognitive functioning .  Her speech was  Pressured.  Language was logical and coherent.  Concentration and focus is Within normal. Psychosis is not noted or  "reported. She reports her mood is Anxious and Depressed.  Affect is congruent with speech and is Anxious and Depressed.  Fund of knowledge is adequate. Insight is adequate for therapy.    Suicidal/Homicidal Ideation present: Patient endorses passive SI since last session.  \"Sometimes I just want to die, I don't want to experience the emotions anymore.\"  Denies plan or intent.      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  She reports poor self-care, stating she has not brushed her teeth over a year showers only on an occasional basis.  She is able to report some satisfaction with move back to her former sober house and finds responsibilities there more manageable despite sometimes struggling with house chores.    Therapist impression of patients current state:   This 45 year old  female with long history of anxiety, depression, borderline personality issues presents for telemedicine psychotherapy video visit. Patient is open and cooperative in session.  Patient would decompensate without regular therapy sessions to support the use of coping and emotional regulation skills.   Patient utilizes session to process thoughts and feelings around multiple psychosocial stressors.  Today she identifies some goals to take better care of herself and show some insight into the ways that her grooming could affect relationship with others.  Processed negative cognitions, reinforced strengths, validated patient efforts and feelings. Offered empathic listening and reflections and questions intended to evoke change talk, explored needs and resources, and provided emotional support.  Patient identifies goal to improve grooming \"so that I do not drive other people away from me\".    Assessment " "tools used: not today     Type of psychotherapeutic technique provided: Client centered, Solution-focused, CBT and DBT     Progress toward short term goals:Progress as expected,   Patient reports she was able to complete cleaning as required to keep her housing and was intact able to break tasks down over a period of days to reduce overall stress and physical pain.  Patient continues to make use of AA group support at least 2 times per week and remains free of substance abuse.    Review of long term goals: Long-term goals reviewed   and Treatment Plan updated .     Diagnosis:   1. Generalized anxiety disorder    2. Bipolar affective disorder, currently depressed, moderate (H)    3. Borderline personality disorder (H)    4. Alcohol dependence in early, early partial, sustained full, or sustained partial remission (H)    5. Moderate tetrahydrocannabinol (THC) dependence in early remission (H)    **slightly worsened with psychosocial stressors    Plan and Follow-up: Patient will return for follow up in two weeks. She will take out bankruptcy paperwork and make a list of items that she needs to complete, breaking these down in more manageable groups.  She will identify the grooming that she wants to complete in order to not repel others, will consider \"what what I want in their shoes\" and take initial steps.    Patient will continue to complete cleaning as required by her sober residence, breaking down tasks in manageable chunks to better manage personal time, physical capacity.  We will continue to make use of AA group support at least twice per week and remain free of substance use.    Discharge Criteria/Planning: Client has chronic symptoms and ongoing therapy for maintenance stability recommended. and Patient will continue with follow-up until therapy can be discontinued without return of signs and symptoms.    I have reviewed the note as documented above.  This accurately captures the substance of my conversation " with the patient.    As the provider I attest to compliance with applicable laws and regulations related to telemedicine.  Performed and documented by Jacquelyn Amanda [facility]  12/17/2020

## 2021-06-14 NOTE — PROGRESS NOTES
Mental Health Visit Note     Patient: Taylor Bryan    : 1975 MRN: [id number]    2021    Start time: 3:04pm   Stop Time: 3:49pm  Session # 3    Session Type: Patient is presenting for an Individual session.    Taylor Bryan is a 45 y.o. female is being seen today for   Chief Complaint   Patient presents with     MH Follow Up     Video Visit      Anxiety     anxiety both chronic      Depression     low mood with anxiety, low motivation     PTSD     chronic,    .     Telemedicine Visit: The patient's condition can be safely assessed and treated via synchronous audio and visual telemedicine encounter.      Reason for Telemedicine Visit: Services only offered telehealth    Originating Site (Patient Location): Patient's home    Distant Site (Provider Location): Provider Remote Setting    Consent:  The patient/guardian has verbally consented to: the potential risks and benefits of telemedicine (video visit) versus in person care; bill my insurance or make self-payment for services provided; and responsibility for payment of non-covered services.     Mode of Communication:  Video Conference via Amwell    As the provider I attest to compliance with applicable laws and regulations related to telemedicine.    Those present for this visit Jacquelyn GARCIA, Taylor Zayda    Follow up in regards to ongoing symptom management of anxiety and depression, anger and rage issues    New symptoms or complaints: Acute anxiety and feelings of being unsafe    Functional Impairment:   Personal: 3  Family: 2  Social: 2  Work: 3    Clinical assessment of mental status: [Same MS as 21]  Taylor Bryan presented on time.   She was oriented x3, open and cooperative, and dressed appropriately for this session and weather. Her memory was Normal cognitive functioning .  Her speech was  Pressured.  Language was logical and coherent.  Concentration and focus is Within normal. Psychosis is not noted or reported. She  "reports her mood is Anxious and Depressed.  Affect is congruent with speech and is Anxious and Depressed.  Fund of knowledge is adequate. Insight is adequate for therapy.    Suicidal/Homicidal Ideation present: Patient endorses occasional passive SI, none since last session.  \"Sometimes I just want to die, I don't want to experience the emotions anymore.\"  Denies plan or intent.      Patient's impression of their current status:   \"I go around feeling like someone is going to hurt me.  I know it's not true but it's scary anyway\".  Patient reports mood is anxious, panicky, depressed.  She complains of poor motivation.      Therapist impression of patients current state:   This 45 year old  female with long history of anxiety, depression, borderline personality issues presents for telemedicine psychotherapy video visit. Patient is open and cooperative in session.  Patient would decompensate without regular therapy sessions to support the use of coping and emotional regulation skills.   Patient continues to present with motivational concerns today that apparently put her housing at some risk.  She does reports remaining stable from substance abuse.  Processed negative cognitions, reinforced strengths, validated patient efforts and feelings. Offered empathic listening and reflections and questions intended to evoke change talk, explored needs and resources, and provided emotional support.   Discussed with patient that she only has to undertake the changes that she wants to make because of benefits and consequences in her life. Discussed mindfulness concept of self-compassion, self-acceptance.    Assessment tools used: not today     Type of psychotherapeutic technique provided: Client centered, CBT and Motivational Interviewing     Progress toward short term goals: Mixed progress:  Patient unable to make any progress on cleaning as required by sober house. She does connect with her AA friends and is attending " meetings.  States she feels proud of the sobriety that she's been able to obtain since the pandemic started.    Review of long term goals: Not done at today's visit and Date of last review 12/17/20 .     Diagnosis:   1. Generalized anxiety disorder    2. Bipolar affective disorder, currently depressed, moderate (H)    3. Borderline personality disorder (H)    4. Alcohol dependence in early, early partial, sustained full, or sustained partial remission (H)    5. Moderate tetrahydrocannabinol (THC) dependence in early remission (H)    **slightly improved    Plan and Follow-up: Patient will return for follow up in two weeks. Goals continued from last session in order to stabilize living situation in support of mental health:  Patient will complete cleaning her room as required by her sober residence, breaking down tasks in manageable chunks to better manage personal time, physical capacity.    She will continue to make use of AA group support at least twice per week and remain free of substance use.    Forwarded goals for future work: She will take out bankruptcy paperwork and make a list of items that she needs to complete, breaking these down in more manageable groups.      Discharge Criteria/Planning: Client has chronic symptoms and ongoing therapy for maintenance stability recommended. and Patient will continue with follow-up until therapy can be discontinued without return of signs and symptoms.    I have reviewed the note as documented above.  This accurately captures the substance of my conversation with the patient.    As the provider I attest to compliance with applicable laws and regulations related to telemedicine.  Performed and documented by Jacquelyn MEDELSW  1/28/2021

## 2021-06-14 NOTE — PROGRESS NOTES
Mental Health Visit Note     Patient: Taylor Bryan    : 1975 MRN: [id number]    2021    Start time: 3:09pm   Stop Time: 3:54pm  Session # 2    Session Type: Patient is presenting for an Individual session.    Taylor Bryan is a 45 y.o. female is being seen today for   Chief Complaint   Patient presents with     MH Follow Up     Video visit      Depression     low mood, low motivation     Anxiety     anxiety with household responsibilities, overwhelm     Alcohol Problem     early remission,    .     Telemedicine Visit: The patient's condition can be safely assessed and treated via synchronous audio and visual telemedicine encounter.      Reason for Telemedicine Visit: Services only offered telehealth    Originating Site (Patient Location): Patient's home    Distant Site (Provider Location): Provider Remote Setting    Consent:  The patient/guardian has verbally consented to: the potential risks and benefits of telemedicine (video visit) versus in person care; bill my insurance or make self-payment for services provided; and responsibility for payment of non-covered services.     Mode of Communication:  Video Conference via Amwell    As the provider I attest to compliance with applicable laws and regulations related to telemedicine.    Those present for this visit Jacquelyn GARCIA, Taylor Gonzalesaide    Follow up in regards to ongoing symptom management of anxiety and depression, anger and rage issues    New symptoms or complaints: None    Functional Impairment:   Personal: 3  Family: 2  Social: 2  Work: 3    Clinical assessment of mental status:   Taylor Bryan presented on time.   She was oriented x3, open and cooperative, and dressed appropriately for this session and weather. Her memory was Normal cognitive functioning .  Her speech was  Pressured.  Language was logical and coherent.  Concentration and focus is Within normal. Psychosis is not noted or reported. She reports her mood is Anxious  "and Depressed.  Affect is congruent with speech and is Anxious and Depressed.  Fund of knowledge is adequate. Insight is adequate for therapy.    Suicidal/Homicidal Ideation present: Patient endorses occasional passive SI, none since last session.  \"Sometimes I just want to die, I don't want to experience the emotions anymore.\"  Denies plan or intent.      Patient's impression of their current status:   \"Well, I took a shower\"  Patient reports mood is depressed, somewhat apathetic with poor motivation.  She is still feeling pressured to meet contractual terms of living at her sober house to clean her room to meet inspection \"but they kind of dropped it so I'm not feeling as pressured today.\"  She doesn't believe she has the resources to do this cleaning on her own.     Therapist impression of patients current state:   This 45 year old  female with long history of anxiety, depression, borderline personality issues presents for telemedicine psychotherapy video visit. Patient is open and cooperative in session.  Patient would decompensate without regular therapy sessions to support the use of coping and emotional regulation skills.   Patient continues to present with motivational concerns today that apparently put her housing at some risk.  She does reports remaining stable from substance abuse.  Processed negative cognitions, reinforced strengths, validated patient efforts and feelings.  Offered empathic listening and reflections and questions intended to evoke change talk, explored needs and resources, and provided emotional support.   Discussed pros and cons of living at sober residence.  Patient able to identify that she feels more stable there, she is familiar and comfortable with the house.  Identifies she would not want to live in a nursing home where everything was done for her because she would have no control of her money, which would all go for room and board.  Identifies this is a \"good enough\" " "reason to clean her room to meet required terms.  Discussed focusing on small achievable task rather than the larger total that feels overwhelming to her.   Discussed options of asking for help or coaching from her AA friends.     Assessment tools used: not today     Type of psychotherapeutic technique provided: Client centered, Solution-focused, CBT and Motivational Interviewing     Progress toward short term goals: Mixed progress:  She identified  the grooming that she wants to complete in order to not repel others, took a shower, able to identify \"feeling better to be clean\".  Patient unable to make any progress on required cleaning as discussed. She did continue AA attendance and remains free of substance abuse.  \"I feel good about that.\"    Review of long term goals: Not done at today's visit and Date of last review 12/17/20 .     Diagnosis:   1. Generalized anxiety disorder    2. Bipolar affective disorder, currently depressed, moderate (H)    3. Borderline personality disorder (H)    4. Alcohol dependence in early, early partial, sustained full, or sustained partial remission (H)    5. Moderate tetrahydrocannabinol (THC) dependence in early remission (H)    **slightly improved    Plan and Follow-up: Patient will return for follow up in two weeks. Goals continued from last session in order to stabilize living situation in support of mental health:  Patient will complete cleaning her room as required by her sober residence, breaking down tasks in manageable chunks to better manage personal time, physical capacity.    She will continue to make use of AA group support at least twice per week and remain free of substance use.    Forwarded goals for future work: She will take out bankruptcy paperwork and make a list of items that she needs to complete, breaking these down in more manageable groups.      Discharge Criteria/Planning: Client has chronic symptoms and ongoing therapy for maintenance stability " recommended. and Patient will continue with follow-up until therapy can be discontinued without return of signs and symptoms.    I have reviewed the note as documented above.  This accurately captures the substance of my conversation with the patient.    As the provider I attest to compliance with applicable laws and regulations related to telemedicine.  Performed and documented by Jacquelyn Amanda [facility]  1/21/2021   None

## 2021-06-15 NOTE — PROGRESS NOTES
Mental Health Visit Note     Patient: Taylor Bryan    : 1975 MRN: [id number]    3/4/2021    Start time: 3:01pm   Stop Time: 3:48pm  Session # 3    Session Type: Patient is presenting for an Individual session.    Taylor Bryan is a 46 y.o. female is being seen today for   Chief Complaint   Patient presents with     MH Follow Up     Video visit     Anxiety     anxiety with promotion at work     Depression     improved with better self-confidence     PTSD     chronic     Alcohol Problem     in early remission     Addiction     THC addiction in early remission   .     Telemedicine Visit: The patient's condition can be safely assessed and treated via synchronous audio and visual telemedicine encounter.      Reason for Telemedicine Visit: Services only offered telehealth    Originating Site (Patient Location): Patient's home    Distant Site (Provider Location): Provider Remote Setting    Consent:  The patient/guardian has verbally consented to: the potential risks and benefits of telemedicine (video visit) versus in person care; bill my insurance or make self-payment for services provided; and responsibility for payment of non-covered services.     Mode of Communication:  Video Conference via Zuffle    As the provider I attest to compliance with applicable laws and regulations related to telemedicine.    Those present for this visit Jacquelyn GARCIA, Taylor Priest    Follow up in regards to ongoing symptom management of anxiety and depression, anger and rage issues    New symptoms or complaints: Anxiety in the context of promotion at work    Functional Impairment:   Personal: 3  Family: 2  Social: 2  Work: 3    Clinical assessment of mental status:   Taylor Bryan presented on time.   She was oriented x3, open and cooperative, and dressed appropriately for this session and weather. Her memory was Normal cognitive functioning .  Her speech was  Dramatic, Pressured and Loud.  Language was logical and  "coherent.  Concentration and focus is Within normal. Psychosis is not noted or reported. She reports her mood is Anxious.  Affect is congruent with speech and is Anxious and Depressed.  Fund of knowledge is adequate. Insight is adequate for therapy.    Suicidal/Homicidal Ideation present: Patient endorses occasional passive SI, none since last session.  Patient hx includes \"Sometimes I just want to die, I don't want to experience the emotions anymore.\"  Denies plan or intent.      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  She has anxiety about keeping up with everything \"Because I have to multi-task a lot\".  Responsibilities include direct supervision and support, phone work, crisis text, tech issues.     Still struggling with cleaning her room at her sober house, housing is at risk due to this but she now feels that she's \"just going to have to do it tonight\", a little bit at a time, taking breaks as needed.      Therapist impression of patients current state:   This 45 year old  female with long history of anxiety, depression, borderline personality issues presents for telemedicine psychotherapy video visit. Patient is open and cooperative in session.  Patient would decompensate without regular therapy sessions to support the use of coping and emotional regulation skills.   Today patient shows remarkable improved insight, self-confidence, and willingness following a promotion at work.   Processed negative cognitions, reinforced strengths, validated patient efforts and feelings.  Normalized anxiety with increase in responsibility.  Discussed the skills she utilized to be recognized for the promotion: self-care, setting limits, appropriate boundaries. Patient able to identify: \"I earned the promotion.\"    Discussed utilizing DBT skills in her new role. Discussed not every day will be easy or clear, every relationship is not easy, " "and this is not on her.  Remembering where she starts and ends, where the other person starts and ends, staying in her shiva, not rescuing.  Discussed concept of serendipity and as she does her risk, she will recognize more moments where she can be kind, bring her own experience to bear, treat others \"like a human being\" like she likes to be treated.  Identified validation \"Wide open heart and excellent boundaries.\"    Assessment tools used: not today     Type of psychotherapeutic technique provided: Client centered, CBT and Motivational Interviewing     Progress toward short term goals: Mixed progress:  Better insight and willingness.  Patient unable to make any progress on cleaning as required by sober house. She does connect with her AA friends and is attending meetings.  States she feels proud of the sobriety that she's been able to obtain since the pandemic started.    Review of long term goals: Not done at today's visit and Date of last review 12/17/20 .     Diagnosis:   1. Generalized anxiety disorder    2. Bipolar affective disorder, currently depressed, moderate (H)    3. Borderline personality disorder (H)    4. Alcohol dependence in early, early partial, sustained full, or sustained partial remission (H)    5. Moderate tetrahydrocannabinol (THC) dependence in early remission (H)    **slightly improved    Plan and Follow-up: Patient will return for follow up in two weeks.  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   Patient will remind herslf \"not every day will be easy or clear, every relationship is not easy, and this is not on me\".  She will apply principles  where she starts and ends, where the other person starts and ends, staying in her shiva, not rescuing.  She will work to recognize more moments where she can be kind, bring her own experience to bear, treat others \"like a human being\" like she likes to be " "treated. Will work with daily validation in her new job  \"Wide open heart and excellent boundaries.\"   She will continue to make use of AA group support at least twice per week and remain free of substance use.    Forwarded goals for future work: She will take out bankruptcy paperwork and make a list of items that she needs to complete, breaking these down in more manageable groups.      Discharge Criteria/Planning: Client has chronic symptoms and ongoing therapy for maintenance stability recommended. and Patient will continue with follow-up until therapy can be discontinued without return of signs and symptoms.    I have reviewed the note as documented above.  This accurately captures the substance of my conversation with the patient.    As the provider I attest to compliance with applicable laws and regulations related to telemedicine.  Performed and documented by Jacquelyn Amanda [facility]  3/4/2021  "

## 2021-06-16 PROBLEM — K21.9 GASTROESOPHAGEAL REFLUX DISEASE WITHOUT ESOPHAGITIS: Status: ACTIVE | Noted: 2019-07-23

## 2021-06-16 PROBLEM — F84.0 AUTISM SPECTRUM DISORDER: Status: ACTIVE | Noted: 2019-12-18

## 2021-06-16 PROBLEM — F12.20 CANNABIS USE DISORDER, SEVERE, DEPENDENCE (H): Status: ACTIVE | Noted: 2019-07-23

## 2021-06-16 PROBLEM — F10.20 SEVERE ALCOHOL USE DISORDER (H): Status: ACTIVE | Noted: 2019-07-23

## 2021-06-16 PROBLEM — F31.78 BIPOLAR DISORDER, IN FULL REMISSION, MOST RECENT EPISODE MIXED (H): Status: ACTIVE | Noted: 2019-07-23

## 2021-06-16 NOTE — PROGRESS NOTES
Mental Health Visit Note     Patient: Taylor Bryna    : 1975 MRN: [id number]    2021    Start time: 3:06pm   Stop Time: 3:51pm  Session # 5    Session Type: Patient is presenting for an Individual session.    Taylor Bryan is a 46 y.o. female is being seen today for   Chief Complaint   Patient presents with     MH Follow Up     Video visit     Anxiety     Mild to moderate anxiety in the context of work stress, interpersonal issues     Depression     Mild low mood in the context of work stess and interpersonal stress     Alcohol Problem     alcohol dependency in early remission     Addiction     hx of THC dependency in early remission   .     Telemedicine Visit: The patient's condition can be safely assessed and treated via synchronous audio and visual telemedicine encounter.      Reason for Telemedicine Visit: Services only offered telehealth    Originating Site (Patient Location): Patient's home    Distant Site (Provider Location): Provider Remote Setting    Consent:  The patient/guardian has verbally consented to: the potential risks and benefits of telemedicine (video visit) versus in person care; bill my insurance or make self-payment for services provided; and responsibility for payment of non-covered services.     Mode of Communication:  Video Conference via [employer]    As the provider I attest to compliance with applicable laws and regulations related to telemedicine.    Those present for this visit Jacquelyn GARCIA, Taylor Priest    Follow up in regards to ongoing symptom management of anxiety and depression, anger and irritability issues    New symptoms or complaints: Mild anxiety and depression with work stress     Functional Impairment:   Personal: 3  Family: 2  Social: 2  Work: 2-3    Clinical assessment of mental status:   Taylor Bryan presented on time.   She was oriented x3, open and cooperative, and dressed appropriately for this session and weather. Her memory was Normal  "cognitive functioning .  Her speech was  Dramatic, Pressured and Loud.  Language was logical and coherent.  Concentration and focus is Within normal. Psychosis is not noted or reported. She reports her mood is Anxious.  Affect is congruent with speech and is Irritable and Anxious.  Fund of knowledge is adequate. Insight is adequate for therapy.    Suicidal/Homicidal Ideation present: Patient endorses occasional passive SI, none since last session. [Patient hx includes \"Sometimes I just want to die, I don't want to experience the emotions anymore.\"  Consistently denies plan or intent.]     Patient's impression of their current status:   \"I'm learning to achieve balance and boundaries.\"  \"I'm learning how I used to just get hurt by other people setting boundaries.\"  \"I've developed this set of boundaries I didn't used to have.  I didn't even think that people can develop that.\"  Patient reports mood is improved overall.  She discussed stress especially interpersonal on the job, utilizing new internal resources to help her manage same.    Therapist impression of patients current state:   This 45 year old  female with long history of anxiety, depression, PTSD, borderline personality issues presents for telemedicine psychotherapy video visit. Patient is open and cooperative in session.  Patient shows remarkable improved insight, self-confidence, and willingness that has led to a promotion at work.   Patient would decompensate without regular therapy sessions to support the use of coping and emotional regulation skills.   Processed negative cognitions, reinforced strengths, validated patient efforts and feelings.  Discussed mindfulness and self-compassion principles she is now able to apply to her life without feeling as uncomfortable and rejecting of same.  \"I\"m no longer frightened to focus on other people.  I no longer believe that I am going to be run over by a truck or be harmed by acknowledging  other " "people and their boundaries.\"  \"The more compassion I have for myself, the more compassion I have for others.\"    Assessment tools used: PHQ-9 and KADEN-7 pushed out to patient via My Chart and can be seen in Doc flowsheets.  [facility] and [facility] for the purposes of treatment planning.    Type of psychotherapeutic technique provided: Client centered, CBT and MIndfulness, DBT     Progress toward short term goals:Progress as expected,   patient able to clean room as per requirement by sober house \"but I still struggle with it\".  Utilized \" a little at a time\" for better emotional and physical tolerance.  Utilized validation \"not every day will be easy or clear..\" and \"Wide open heart and excellent boundaries\" each work day, 3x/week.  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  She continues to attend AA at least 2x/week and remains substance free.    Review of long term goals: Long-term goals reviewed   and Treatment Plan updated     Diagnosis:   1. Generalized anxiety disorder    2. Bipolar affective disorder, currently depressed, moderate (H)    3. Borderline personality disorder (H)    4. Alcohol dependence in early, early partial, sustained full, or sustained partial remission (H)    5. Moderate tetrahydrocannabinol (THC) dependence in early remission (H)    **slightly improved    Plan and Follow-up: Patient will return for follow up in two weeks.  Patient will utilize self-validations each work day \"not every day will be easy or clear, every relationship is not easy, and this is not on me\" and \"Wide open heart and excellent boundaries.\"   She will work to recognize more moments where she can be kind, bring her own experience to bear, treat others \"like a human being\" like she likes to be treated. She will continue to apply principles of where she starts and ends, where the other person starts and ends, staying in her shiva, not rescuing.  She will continue to make use " of AA group support at least twice per week and remain free of substance use.    Forwarded goals for future work: She will take out bankruptcy paperwork and make a list of items that she needs to complete, breaking these down in more manageable groups.      Discharge Criteria/Planning: Client has chronic symptoms and ongoing therapy for maintenance stability recommended. and Patient will continue with follow-up until therapy can be discontinued without return of signs and symptoms.    I have reviewed the note as documented above.  This accurately captures the substance of my conversation with the patient.    As the provider I attest to compliance with applicable laws and regulations related to telemedicine.  Performed and documented by Jaqcuelyn Amanda [facility]  4/8/2021

## 2021-06-16 NOTE — PROGRESS NOTES
Mental Health Visit Note     Patient: Taylor Bryan    : 1975 MRN: [id number]    2021    Start time: 3:10pm   Stop Time: 3:56pm  Session # 6    Session Type: Patient is presenting for an Individual session.    Taylor Bryan is a 46 y.o. female is being seen today for   Chief Complaint   Patient presents with      Follow Up     Video visit     Anxiety     Anxiety in the context of new promotion     Depression     Mild low mood with difficulty with work/life balance     PTSD     Hx of childhood trauma with associated triggers     Addiction     THC dependency in early remission, 21 months in early remission     Alcohol Problem     Etoh dependency in early remission, 13 months sobriety in early remission   .     Telemedicine Visit: The patient's condition can be safely assessed and treated via synchronous audio and visual telemedicine encounter.      Reason for Telemedicine Visit: Services only offered telehealth    Originating Site (Patient Location): Patient's home    Distant Site (Provider Location): Provider Remote Setting    Consent:  The patient/guardian has verbally consented to: the potential risks and benefits of telemedicine (video visit) versus in person care; bill my insurance or make self-payment for services provided; and responsibility for payment of non-covered services.     Mode of Communication:  Video Conference via Freebase    As the provider I attest to compliance with applicable laws and regulations related to telemedicine.    Those present for this visit Jacquelyn GARCIA, Taylor Priest    Follow up in regards to ongoing symptom management of anxiety and depression, anger and irritability issues    New symptoms or complaints: None     Functional Impairment:   Personal: 2  Family: 2  Social: 2  Work: 2     Clinical assessment of mental status:   Taylor Bryan presented on time.   She was oriented x3, open and cooperative, and dressed appropriately for this session and  "weather. Her memory was Normal cognitive functioning .  Her speech was  Dramatic, Pressured and Loud.  Language was logical and coherent.  Concentration and focus is Within normal. Psychosis is not noted or reported. She reports her mood is Anxious and Euthymic.  Affect is congruent with speech and is Anxious and Euthymic.  Fund of knowledge is adequate. Insight is adequate for therapy.    Suicidal/Homicidal Ideation present: Patient endorses occasional passive SI, none since last session. [Patient hx includes \"Sometimes I just want to die, I don't want to experience the emotions anymore.\"  Consistently denies plan or intent.]     Patient's impression of their current status:   \"I didn't get up until 1pm.\"  \"I'm constantly afraid of being evicted or being fired.\"  \"I'm now cleaning my room because I don't want them to be on me about it.\"  \"The job is doable and enjoyable. But this is the first week that I've been more comfortable.\"  \"There's a risk that I'll be condescending and arrogant, but kindness works so much better.  I actually see the outcome of kindness with others.\"  Patient reports mood is less depressed and irritable, continues anxious.  Processes \"difficult and conflictual\"  interaction with psychiatrist who initially told her that he won't prescribe medicine for ADHD. \"He told me I was old and set in my ways and told me he wouldn't help me.\"  Ultimately Guanfacine was prescribed and patient feels this has been helpful to her.    Therapist impression of patients current state:   This 45 year old  female with long history of anxiety, depression, PTSD, borderline personality issues presents for telemedicine psychotherapy video visit. Patient is open and cooperative in session.  Patient shows remarkable improved insight, self-confidence, and willingness that has led to a promotion at her workplace.  Patient would decompensate without regular therapy sessions to support the use of coping and " "emotional regulation skills. Marked improvement notable in mood and motivation, she may be ready to reduce frequency of sessions.  Processed negative cognitions, reinforced strengths, validated patient efforts and feelings.  Discussed concept of 'both/and'. Discussed leaning into new identify rather than mentally ill:  anxious and brave/vulnerable and competent.    Assessment tools used: PHQ-9 and KADEN-7 pushed out to patient via My Chart and can be seen in Doc flowsheets.  [facility]OD[facility] and [place] were completed for the purposes of treatment planning 4/8/21.    Type of psychotherapeutic technique provided: Client centered, CBT and MIndfulness     Progress toward short term goals:Progress as expected,     Patient utilized self-validations as discussed each work day as discussed in session, \"not every day will be easy or clear\", \"every relationship is not easy\", and \"this is not on me\" and \"Wide open heart and excellent boundaries.\"   She worked to recognize more moments where she can be kind, bring her own experience to bear, treat others \"like a human being\" like she likes to be treated, found this very helpful in her new job.    Continues to practice applications of principles of where she starts and ends, where the other person starts and ends, staying in her shiva, not rescuing.  Attended AA group support at least twice per week and remains free of substance use.    Review of long term goals: Not done at today's visit and Date of last review  4/8/2021     Diagnosis:   1. Generalized anxiety disorder    2. Bipolar affective disorder, currently depressed, moderate (H)    3. Borderline personality disorder (H)    4. Alcohol dependence in early, early partial, sustained full, or sustained partial remission (H)    5. Moderate tetrahydrocannabinol (THC) dependence in early remission (H)    **slightly improved    Plan and Follow-up: Patient will return for follow up in two weeks.    She will continue use of validations to " "remind herself of boundaries especially at work: \"not every day will be easy or clear, every relationship is not easy, and this is not on me\" and \"Wide open heart and excellent boundaries.\"   She will continue to make use of AA group support at least twice per week and remain free of substance use.    Forwarded goals for future work: She will take out bankruptcy paperwork and make a list of items that she needs to complete, breaking these down in more manageable groups.      Discharge Criteria/Planning: Client has chronic symptoms and ongoing therapy for maintenance stability recommended. and Patient will continue with follow-up until therapy can be discontinued without return of signs and symptoms.    I have reviewed the note as documented above.  This accurately captures the substance of my conversation with the patient.    As the provider I attest to compliance with applicable laws and regulations related to telemedicine.  Performed and documented by Jacquelyn Amanda [facility]  4/22/2021        "

## 2021-06-16 NOTE — PROGRESS NOTES
Mental Health Visit Note     Patient: Taylor Bryan    : 1975 MRN: [id number]    3/18/2021    Start time: 3:06pm   Stop Time: 3:52pm  Session # 4    Session Type: Patient is presenting for an Individual session.    Taylor Bryan is a 46 y.o. female is being seen today for   Chief Complaint   Patient presents with     MH Follow Up     Video visit     Anxiety     Anxiety in the context of job stress     Depression     Mild low mood in the context job stress     Alcohol Problem     Alcohol dependency in early remission     Addiction     THC dependency in early remission   .     Telemedicine Visit: The patient's condition can be safely assessed and treated via synchronous audio and visual telemedicine encounter.      Reason for Telemedicine Visit: Services only offered telehealth    Originating Site (Patient Location): Patient's home    Distant Site (Provider Location): Provider Remote Setting    Consent:  The patient/guardian has verbally consented to: the potential risks and benefits of telemedicine (video visit) versus in person care; bill my insurance or make self-payment for services provided; and responsibility for payment of non-covered services.     Mode of Communication:  Video Conference via Social DJ    As the provider I attest to compliance with applicable laws and regulations related to telemedicine.    Those present for this visit Taylor Art    Follow up in regards to ongoing symptom management of anxiety and depression, anger and rage issues    New symptoms or complaints: None    Functional Impairment:   Personal: 3  Family: 2  Social: 2  Work: 2-3    Clinical assessment of mental status:   Taylor Bryan presented on time.   She was oriented x3, open and cooperative, and dressed appropriately for this session and weather. Her memory was Normal cognitive functioning .  Her speech was  Dramatic, Pressured and Loud.  Language was logical and coherent.  Concentration and  "focus is Within normal. Psychosis is not noted or reported. She reports her mood is Anxious.  Affect is congruent with speech and is Anxious and Depressed.  Fund of knowledge is adequate. Insight is adequate for therapy.    Suicidal/Homicidal Ideation present: Patient endorses occasional passive SI, none since last session.  Patient hx includes \"Sometimes I just want to die, I don't want to experience the emotions anymore.\"  Denies plan or intent.      Patient's impression of their current status:   \"When I was in my 30's I wanted to be suicidal, I wanted to suffer,  I wanted the attention.  I wanted to be rescued.\"  \"But nothing changes if nothing changes.\":  AA  \"I'm telling myself: You can do this. No more excuses. You don't need excuses.  Stop being a victim.\"  \"Part of me says I shouldn't have to do something if it causes me pain.\"   \"I shouldn't have to do things without motivation, but it's hard for me.\"  \"I collect mess like squirrels collect nuts.\"  Patient reports mood as euthymic, less depressed and less anxious oveall.  Her psychiatrist has prescribed Straterra, Clonodine for ADD.  She was unable to tolerate side affects of Clonopine but finds Straterra very useful 2-3 months.     Therapist impression of patients current state:   This 45 year old  female with long history of anxiety, depression, borderline personality issues presents for telemedicine psychotherapy video visit. Patient is open and cooperative in session.  Patient shows remarkable improved insight, self-confidence, and willingness that has led to a promotion at work.   Patient would decompensate without regular therapy sessions to support the use of coping and emotional regulation skills.   Processed negative cognitions, reinforced strengths, validated patient efforts and feelings.  Discussed motivation in the context of mindfulness \"waking up\"    Patient able to identify: \"I have the power to affect my perspective.\"  Discussed " "Monty Caban's quote: \"Integrity is choosing courage over comfort, it's choosing what's right over what's fun or easy, and it's practicing your values, not just professing\".  Patient identifies things she'd like to change if she applies this to her own life: \"i'd like to clean my room daily.\"  Identified validation for working with interpersonal issues in her new job: \"Wide open heart and excellent boundaries.\"    Assessment tools used: not today     Type of psychotherapeutic technique provided: Client centered, CBT and Motivational Interviewing     Progress toward short term goals:Progress as expected,  Patient completed cleaning her room as required for lease in her sober house, breaking up tasks into doable pieces. She utilized validation at her new job; \"not every day will be easy or clear, every relationship is not easy, and this is not on me\".  Considered boundary setting as discussed last session.  Brought kindness into her work with others consistent with her values. She continues to make use of AA twice per week and remains free of substance abuse.    Review of long term goals: Not done at today's visit and Date of last review 12/17/20 . Due next session**    Diagnosis:   1. Generalized anxiety disorder    2. Bipolar affective disorder, currently depressed, moderate (H)    3. Borderline personality disorder (H)    4. Alcohol dependence in early, early partial, sustained full, or sustained partial remission (H)    5. Moderate tetrahydrocannabinol (THC) dependence in early remission (H)    **slightly improved    Plan and Follow-up: Patient will return for follow up in two weeks.  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   Patient will remind herslf \"not every day will be easy or clear, every relationship is not easy, and this is not on me\".  She will apply principles  where she starts and ends, where the other person " "starts and ends, staying in her shiva, not rescuing.  She will work to recognize more moments where she can be kind, bring her own experience to bear, treat others \"like a human being\" like she likes to be treated. Will work with daily validation in her new job  \"Wide open heart and excellent boundaries.\"   She will continue to make use of AA group support at least twice per week and remain free of substance use.    Forwarded goals for future work: She will take out bankruptcy paperwork and make a list of items that she needs to complete, breaking these down in more manageable groups.      Discharge Criteria/Planning: Client has chronic symptoms and ongoing therapy for maintenance stability recommended. and Patient will continue with follow-up until therapy can be discontinued without return of signs and symptoms.    I have reviewed the note as documented above.  This accurately captures the substance of my conversation with the patient.    As the provider I attest to compliance with applicable laws and regulations related to telemedicine.  Performed and documented by Jacquelyn Amanda [facility]  3/18/2021            "

## 2021-06-17 NOTE — PROGRESS NOTES
Mental Health Visit Note     Patient: Taylor Bryan    : 1975 MRN: [id number]    2021    Start time: 3:03pm   Stop Time: 3:49pm  Session # 7    Session Type: Patient is presenting for an Individual session.    Taylor Bryan is a 46 y.o. female is being seen today for   Chief Complaint   Patient presents with     MH Follow Up     Video visit     Anxiety     anxiety in the context of work stress     Depression     low mood in the context of shame being triggered, at her job     PTSD     chronic with associated triggers     ADHD     hx of ADHD   .     Telemedicine Visit: The patient's condition can be safely assessed and treated via synchronous audio and visual telemedicine encounter.      Reason for Telemedicine Visit: Services only offered telehealth    Originating Site (Patient Location): Patient's home    Distant Site (Provider Location): Provider Remote Setting    Consent:  The patient/guardian has verbally consented to: the potential risks and benefits of telemedicine (video visit) versus in person care; bill my insurance or make self-payment for services provided; and responsibility for payment of non-covered services.     Mode of Communication:  Video Conference via Knovel    As the provider I attest to compliance with applicable laws and regulations related to telemedicine.    Those present for this visit Jacquelyn GARCIA, Taylor Priest    Follow up in regards to ongoing symptom management of anxiety and depression, anger and irritability issues    New symptoms or complaints: Anxiety in the context of new job, self-esteem issues comparing herself to others      Functional Impairment:   Personal: 2-3  Family: 2  Social: 2  Work: 2     Clinical assessment of mental status:   Taylor Bryan presented on time.   She was oriented x3, open and cooperative, and dressed appropriately for this session and weather. Her memory was Normal cognitive functioning .  Her speech was  Dramatic, Pressured  "and Loud.  Language was logical and coherent.  Concentration and focus is Within normal. Psychosis is not noted or reported. She reports her mood is Anxious and Euthymic.  Affect is congruent with speech and is Anxious and Euthymic.  Fund of knowledge is adequate. Insight is adequate for therapy.    Suicidal/Homicidal Ideation present: Patient endorses occasional passive SI, none since last session. [Patient hx includes \"Sometimes I just want to die, I don't want to experience the emotions anymore.\"  Consistently denies plan or intent.]     Patient's impression of their current status:   \"I have been really ngo.\"  \"I have that borderline perspective that everything is going to stay the same all the time, whether it's good or bad.\"  \"I'm doubting everything.\"  \"I feel like I'm the worst of all of the supervisors.\"   Patient reports mood is anxious in the context of work stress, not feeling confident of her skills on the job.  She notes somewhat less irritability that has typically accompanied these feelings, and feels she has been able to manage that without \"unloading\" on others.    Therapist impression of patients current state:   This 45 year old  female with long history of anxiety, depression, PTSD, borderline personality issues presents for telemedicine psychotherapy video visit. Patient is open and cooperative in session.  Patient working with stress  remarkable improved insight, self-confidence, and willingness that has led to a promotion at her workplace.  Today she slightly worsened with work stress but feels better equipped to work with her distress without acting out in negative or self-destructive ways.  Patient would decompensate without regular therapy sessions to support the use of coping and emotional regulation skills. Marked improvement notable in mood and motivation, she may be ready to reduce frequency of sessions.  Processed negative cognitions, reinforced strengths, validated patient " "efforts and feelings.  Utilized Wheel of Emotions to identify complex feeling state:   \"I feel anxious, scared, irritable, impatient.\"  Discussed usefulness of Mindfulness Meditation for anxiety management.  Discussed \"clearing the mind\" is not the objective, but rather to sit, observe the breath, notice thoughts and feelings as they come up, and gently without judgement return to following the breath.  Brainstormed options, including mindfulness apps, guided meditation, and simply watching her own breath, starting very small with timer 2 minutes/day.    Assessment tools used: None today.    Type of psychotherapeutic technique provided: Client centered, CBT and MIndfulness     Progress toward short term goals:Progress as expected,  patient identifies having utilized validations to remind herself of her boundaries and limitations at work: \"not every day will be easy or clear, every relationship is not easy, and this is not on me\" and \"Wide open heart and excellent boundaries.\"   She continues to make use of AA recovery support at least 2x/week, and remains free of substance abuse.  \"I feel good about that!\"    Review of long term goals: Not done at today's visit and Date of last review  4/8/2021     Diagnosis:   1. Generalized anxiety disorder    2. Bipolar affective disorder, currently depressed, moderate (H)    3. Borderline personality disorder (H)    4. Alcohol dependence in early, early partial, sustained full, or sustained partial remission (H)    5. Moderate tetrahydrocannabinol (THC) dependence in early remission (H)    **slightly worsened with work stress but feels better equipped to work with her distress without acting out in negative or self-destructive ways.    Plan and Follow-up: Patient will return for follow up in two weeks.  She will seek out consultation with supervisor about her thoughts and feelings around patient needs.   Patient will begin mindfulness meditation practice 2 minutes, 2x day, " shooting for at least 5 days/week and completing at least 3 days/week.  She will continue to make use if AA group support 2x/week and remain free of substance abuse.    Forwarded goals for future work: She will take out bankruptcy paperwork and make a list of items that she needs to complete, breaking these down in more manageable groups.      Discharge Criteria/Planning: Client has chronic symptoms and ongoing therapy for maintenance stability recommended. and Patient will continue with follow-up until therapy can be discontinued without return of signs and symptoms.    I have reviewed the note as documented above.  This accurately captures the substance of my conversation with the patient.    As the provider I attest to compliance with applicable laws and regulations related to telemedicine.  Performed and documented by Jacquelyn Amanda [facility]  5/6/2021

## 2021-06-17 NOTE — PROGRESS NOTES
Mental Health Visit Note     Patient: Taylor Bryan    : 1975 MRN: [id number]    2021    Start time: 3:05pm   Stop Time: 3:50pm  Session # 8    Session Type: Patient is presenting for an Individual session.    Taylor Bryan is a 46 y.o. female is being seen today for   Chief Complaint   Patient presents with     MH Follow Up     Video visit     Anxiety     anxiety with work stress,      Depression     intermittent low mood with interpersonal/work stress   .     Telemedicine Visit: The patient's condition can be safely assessed and treated via synchronous audio and visual telemedicine encounter.      Reason for Telemedicine Visit: Services only offered telehealth    Originating Site (Patient Location): Patient's home    Distant Site (Provider Location): Provider Remote Setting    Consent:  The patient/guardian has verbally consented to: the potential risks and benefits of telemedicine (video visit) versus in person care; bill my insurance or make self-payment for services provided; and responsibility for payment of non-covered services.     Mode of Communication:  Video Conference via Amwell/My Chart    As the provider I attest to compliance with applicable laws and regulations related to telemedicine.    Those present for this visit Jacquelyn GARCIA, Taylor Gonzalesaide    Follow up in regards to ongoing symptom management of anxiety and depression, anger and irritability issues    New symptoms or complaints: None    Functional Impairment:   Personal: 2-3  Family: 2  Social: 2  Work: 2     Clinical assessment of mental status: [Same MS as 2021]  Taylor Bryan presented on time.   She was oriented x3, open and cooperative, and dressed appropriately for this session and weather. Her memory was Normal cognitive functioning .  Her speech was  Pressured and Loud.  Language was logical and coherent.  Concentration and focus is Within normal. Psychosis is not noted or reported. She reports her mood  "is Anxious and Euthymic.  Affect is congruent with speech and is Anxious and Euthymic.  Fund of knowledge is adequate. Insight is adequate for therapy.    Suicidal/Homicidal Ideation present: Patient endorses occasional passive SI, none since last session. [Patient hx includes \"Sometimes I just want to die, I don't want to experience the emotions anymore.\"  Consistently denies plan or intent.]     Patient's impression of their current status:    \"My shower felt so good.\"  \"I think I'm ready to start working on my friendships and my willingness to be vulnerable.\"  \"I'm scared to open up because I'm scared she's going to tell me that I'm doing it wrong. \"It's really only my therapist that I'm willing to be vulnerable.\"  Patient identifies mood as anxious but overall improved.  She has better confidence in her ability to take care of herself in basic ways such as showering, grooming, and cleaning her room per the standards of the sober house. She's allowing herself to enjoy completing those activities \"rather than just resenting them\".    Therapist impression of patients current state:   This 45 year old  female with long history of anxiety, depression, PTSD, borderline personality issues presents for telemedicine psychotherapy video visit. Patient is open and cooperative in session.  Patient showing remarkable improved insight, self-confidence, and willingness that has led to a promotion at her workplace. There is work stress but she feels better equipped to work with her distress without acting out in negative or self-destructive ways. Starting to build in routines that help her cope with daily needs including grooming, showering, cleaning her room per the standards of her sober house.  Patient would decompensate without regular therapy sessions to support the use of coping and emotional regulation skills. Marked improvement notable in mood and motivation, will keep patient on every other week frequency " "while she actively works on goals around self-care and emotional intimacy.  Processed negative cognitions, reinforced strengths, validated patient efforts and feelings. Normalized anxiety in relationships, working with sense of self versus sense of rejection.   Dicussed emotional intimacy, the difference  between being dependent on others and asking for help. Brainstormed where she might start to experiment with feeling and communicating vulnerability.  Patient identifies AA sponsor as relationship that is \"supposed to be safe\".  Discussed that giving to others is a way to locate herself outside of narcicisstic \"small child\" mind.  Reviewed DBT approaches that might help her in her approach to intimacy with others:  GIVE: gentle, interested, validating, easy manner  DEAR MAN: describe, express, assert, reinforce,  Mindful, act confident, negotiate.    Assessment tools used: None today.    Type of psychotherapeutic technique provided: Client centered, CBT and MIndfulness, DBT     Progress toward short term goals:Progress as expected,    Patient watched \"Power of Vulnerability\". Practiced validations and brought them to work 2x/weeK:  \"not every day will be easy or clear, every relationship is not easy, and this is not on me\" and \"Wide open heart and excellent boundaries.\"  She continues to utilize AA recovery at least 2x/week and remains free from substance abuse.    Review of long term goals: Not done at today's visit and Date of last review  4/8/2021     Diagnosis:   1. Generalized anxiety disorder    2. Bipolar affective disorder, currently depressed, moderate (H)    3. Borderline personality disorder (H)    4. Alcohol dependence in early, early partial, sustained full, or sustained partial remission (H)    5. Moderate tetrahydrocannabinol (THC) dependence in early remission (H)    **feels better equipped to work with her distress without acting out in negative or self-destructive ways.    Plan and Follow-up: " Patient will return for follow up in two weeks. Patient will try out sharing more vulnerable feelings, remembering to do this in small and concrete ways rather than all at once (which could overwhelm her).  She will practice DBT strategies to help her tolerate that increased exposure, including GIVE and DEAR MAN.  She will remind herself that with personal growth she may be ready for new or different relationships.    Forwarded goals for future work: She will take out bankruptcy paperwork and make a list of items that she needs to complete, breaking these down in more manageable and concrete tasks.      Discharge Criteria/Planning: Client has chronic symptoms and ongoing therapy for maintenance stability recommended. and Patient will continue with follow-up until therapy can be discontinued without return of signs and symptoms.    I have reviewed the note as documented above.  This accurately captures the substance of my conversation with the patient.    As the provider I attest to compliance with applicable laws and regulations related to telemedicine.  Performed and documented by Jacquelyn Amanda [facility]  5/20/2021

## 2021-06-25 NOTE — PROGRESS NOTES
Mental Health Visit Note     Patient: Taylor Bryan    : 1975 MRN: [id number]    6/3/2021    Start time: 3:05pm   Stop Time: 3:51pm  Session # 9    Session Type: Patient is presenting for an Individual session.    Taylor Bryan is a 46 y.o. female is being seen today for   Chief Complaint   Patient presents with     MH Follow Up     Video visit     Anxiety     anxiety in the context of fears of success     Depression     low mood in the context of feeling insecurity      Alcohol Problem     alcohol dependency in early remission     Addiction     THC dependency in early remission     PTSD     chronic ptsd and associated interpersonal concerns and triggers   .     Telemedicine Visit: The patient's condition can be safely assessed and treated via synchronous audio and visual telemedicine encounter.      Reason for Telemedicine Visit: Services only offered telehealth    Originating Site (Patient Location): Patient's home    Distant Site (Provider Location): Provider Remote Setting    Consent:  The patient/guardian has verbally consented to: the potential risks and benefits of telemedicine (video visit) versus in person care; bill my insurance or make self-payment for services provided; and responsibility for payment of non-covered services.     Mode of Communication:  Video Conference via Amwell/My Chart    As the provider I attest to compliance with applicable laws and regulations related to telemedicine.    Those present for this visit Taylor Art    Follow up in regards to ongoing symptom management of anxiety and depression, anger and irritability issues    New symptoms or complaints: None    Functional Impairment:   Personal: 2-3  Family: 2  Social: 2  Work: 2     Clinical assessment of mental status:  Taylor Bryan presented on time.   She was oriented x3, open and cooperative, and dressed appropriately for this session and weather. Her memory was Normal cognitive  "functioning .  Her speech was  Pressured and Loud.  Language was logical and coherent.  Concentration and focus is Within normal. Psychosis is not noted or reported. She reports her mood is Anxious and Depressed and Euthymic.  Affect is congruent with speech and is Flat and Anxious.  Fund of knowledge is adequate. Insight is adequate for therapy.    Suicidal/Homicidal Ideation present: Patient endorses occasional passive SI, none since last session. [Patient hx includes \"Sometimes I just want to die, I don't want to experience the emotions anymore.\"  Consistently denies plan or intent.]     Patient's impression of their current status:   I\"m staying in my head.  Worry and anxiety is chipping away.\"  \"I do sabotage my life.  I can't control the universe or other people.\"  \"I'm so scared I'm going to be fired, and I keep being told I'm going to be fine.\"  \"I'm 14 months sober. I'm not doing my AA work.  My sponsor tells me to do some 3rd step \"higher power\" work.\"  \"I\"m looking at the dark part of life that I don't want to look at.\"  \"I was very eager to change my life in my 30's.  My therapist challenged me every time I was unwilling to do mindfulness.\"  Patient reports mood is anxious.  She's struggling with self-esteem, self-concept.  She is afraid she will self-sabotage the progress she's made. Feeling lonely with recognition that current relationships may not meet emotional needs given old patterns.    Therapist impression of patients current state:   This 45 year old  female with long history of anxiety, depression, PTSD, borderline personality issues presents for telemedicine psychotherapy video visit. Patient is open and cooperative in session.  Patient showing remarkable improved insight, self-confidence, and willingness that has led to a promotion at her workplace. There is work stress but she feels better equipped to work with her distress without acting out in negative or self-destructive ways. " "Starting to build in routines that help her cope with daily needs including grooming, showering, cleaning her room per the standards of her sober house.  Patient would decompensate without regular therapy sessions to support the use of coping and emotional regulation skills. Marked improvement notable in mood and motivation, will keep patient on every other week frequency while she actively works on goals around self-care and emotional intimacy.  Processed negative cognitions, reinforced strengths, validated patient efforts and feelings. Discussed her progress in regulating emotions and stabilizing her life, how looking at negative emotions, her 'dark side' is unfamiliar to her and may be more anxiety provoking.   Patient admits \"I don't trust reality not to hurt me.\" \"I really don't want to be miserable again.\" \"I'm afraid that If I shower and clean my room and practice self-care, someone is going to sneak up and ruin everything.\"  Discussed need to make conscious choices in her relationships if she wants different qualities like intimate abilities to listen, validate, and point out strengths.  Discussed the application of her DBT skills GIVE and DEAR MAN in choosing self-compassion and compassion for others.    Assessment tools used: None today.    Type of psychotherapeutic technique provided: Client centered, CBT and MIndfulness, DBT     Progress toward short term goals:Progress as expected,   met with AA sponsor who offered step work to patient. She had more difficulty with sharing more vulnerable feelings, struggling with anxiety in the work place and not finding current friendships available for this.  She reminded herself that with personal growth she may be ready for new or different relationships x2.      Review of long term goals: Not done at today's visit and Date of last review  4/8/2021     Diagnosis:   1. Generalized anxiety disorder    2. Bipolar affective disorder, currently depressed, moderate (H)  "   3. Borderline personality disorder (H)    4. Alcohol dependence in early, early partial, sustained full, or sustained partial remission (H)    5. Moderate tetrahydrocannabinol (THC) dependence in early remission (H)    **feels better equipped to work with her distress without acting out in negative or self-destructive ways.    Plan and Follow-up: Patient will return for follow up in two weeks.  Homework continued from last session in order to promote emotional management and increased connection to others;  Patient will try out sharing more vulnerable feelings, remembering to do this in small and concrete ways rather than all at once (which could overwhelm her).  She will practice DBT strategies to help her tolerate that increased exposure, including GIVE and DEAR MAN.  She will remind herself that with personal growth she may be ready for new or different relationships.    Forwarded goals for future work: She will take out bankruptcy paperwork and make a list of items that she needs to complete, breaking these down in more manageable and concrete tasks.      Discharge Criteria/Planning: Client has chronic symptoms and ongoing therapy for maintenance stability recommended. and Patient will continue with follow-up until therapy can be discontinued without return of signs and symptoms.    I have reviewed the note as documented above.  This accurately captures the substance of my conversation with the patient.    As the provider I attest to compliance with applicable laws and regulations related to telemedicine.  Performed and documented by Jacquelyn Amanda [facility]  6/3/2021

## 2021-06-27 ENCOUNTER — HEALTH MAINTENANCE LETTER (OUTPATIENT)
Age: 46
End: 2021-06-27
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
## 2021-06-28 NOTE — PROGRESS NOTES
Progress Notes by Tresa Roman at 11/7/2019  9:11 AM     Author: Tresa Roman Service: -- Author Type: Licensed Alcohol and Drug Counselor    Filed: 11/7/2019  9:13 AM Encounter Date: 11/7/2019 Status: Attested Addendum    : Tresa Roman (Licensed Alcohol and Drug Counselor)    Related Notes: Original Note by Tresa Roman (Licensed Alcohol and Drug Counselor) filed at 11/7/2019  9:13 AM    Cosigner: Letty Mata MD at 11/13/2019  9:14 AM    Attestation signed by Letty Mata MD at 11/13/2019  9:14 AM     Note reviewed and agree with above.                 Weekly Progress Note  Taylor Bryan  1975  [id number]    D) Pt attended ZERO groups  this week with 2absences. Patient attended ZERO individual sessions this week.   A) Staff facilitated groups and reviewed tx progress. Assessed for [facility].   R) Unable to assess along six dimensions or for [facility] due to lack of attendance.   T) Patient is currently in phase 2. Projected discharge date is 2/16/20. Current discharge plan is Phase 3, psychotherapy. Patient is currently admitted to [facility] on their psychiatric unit due to suicidal and homicidal ideation. At this time discharge date and plan are unknown.     ROCKY Elliott, [facility]  11/7/2019, 9:11 AM
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
## 2021-07-04 NOTE — PROGRESS NOTES
"Progress Notes by Jacquelyn Amanda LICSW at 10/8/2020  3:37 PM     Author: Jacquelyn Amanda LICSW Service: Behavioral Author Type: Licensed Social Worker    Filed: 7/3/2021  7:26 AM Encounter Date: 10/8/2020 Status: Signed    : Jacquelyn Amanda LICSW (Licensed Social Worker)       Safety Plan:     Step 1: Warning signs / cues (Thoughts, images, mood, situation, behavior) that a crisis may be developing: please check all that apply and/or add your own    Thoughts:   [x] \"I don't matter\"   [x] \"People would be better off without me\"  [x] \"I'm a burden\"  [x] \"I can't do this anymore\"  [x] \"I just want this to end\"   [x] \"Nothing makes it better\"  [x] \"Somebody is going to hurt me\"    Images:   [x] Obsessive thoughts of death or dying:   [x] Flashbacks   [x] Visions of harm  [x] Other \"My Dead Body\"    Thinking Processes:   [x] Ruminations (can't stop thinking about my problems):   [x] Racing thoughts   [x] Intrusive thoughts (bothersome, unwanted thoughts that come out of nowhere):   [x] Highly critical and negative thoughts:   [] Disorganized thinking:   [x] Paranoia:   [] Depersonalization  [] Other       Mood:  [x] Worsening depression  [x] Hopelessness  [x] Helplessness  [x] Intense anger  [x] Intense worry  [x] Agitation  [] Disinhibited (not caring about things or consequences)   [x] Mood swings  [] Other     Behaviors:   [x] Isolating/withdrawing   [] Using drugs,   [] Using alcohol  [] Can't stop crying  [] Impulsive  [] Reckless behaviors (acting without thinking)  [] Giving things away  [] Saying good-bye  [x] Aggression  [x] Not taking care of myself   [x] Not taking care of my responsibilities  [] Sleeping too much  [x] Not sleeping enough  [] Increasing frequency and duration of dissociation  [] Other     Situations:   [x] Bullying  [] Loss  [] Public shame  [] Legal issues  [] Anniversary of   [] Changes in symptoms   [] Physical Pain   [] Relationship problems  [] Trauma   [x] Relapse of " "alcohol, THC  [x] Financial stress   [x] Medical condition / diagnosis   [] Other     Step 2: Coping strategies  Things I can do to take my mind off of my problems without contacting another person    Distress Tolerance Strategies   [x] Relaxation activities  [] Arts and crafts:   [] Play with my pet   [x] Listen to positive and upbeat music   [x] Sensory based activities/self-soothe with five senses  [] Watch a funny movie  [x] [place]  [x] Read a book  [x] Change body temperature (ice pack/cold water)  [x] Paced breathing/progressive muscle relaxation  [x] Intense exercise for 2-3 minutes; repeat  [] Other     Physical Activities:     [x] Go for a walk  [x] Exercise  [] Yoga  [] Gardening  [] Meditation  [x] Deep breathing   [] Stretching   []  Other     Focus on helpful thoughts:   List thoughts you can remind yourself of that will help you to be safe.  \"It's just feelings.\"  \"I can pet a cat.\"  \"I love (my mother, my sister, my friend).  \"Sunsets are beautiful.\"    Step 3: People that provide distraction:    Name: Mother  Phone: [ADD HERE]    Name: Sister Laina  Phone: [ADD HERE]    Name: Joo Kruse  Phone: [ADD HERE]    Social settings that provide distraction  [x] Movie theater  [x] Pet store/humane society  [x] Zoo  [x] Coffee shop  [x] Park  [] Library  [] Volunteering  [] Community center  [] Gym  [] Alevism  [x] Support group (i.e. twelve-step)  [x] School and/or work  []  Other     Step 4: Remind myself of people that are important to me and worth living for: mother, sister, friend    Remind myself of things/pets/beliefs that are important to me and worth living for:  \"God loves me\"    Step 5: When I am in crisis, I can ask these people to help me use my safety plan:    Name: Mother  Phone: [ADD HERE]    Name: Sister Laina  Phone: [ADD HERE]    Name: Joo Kruse  Phone: [ADD HERE]      Step 6: Making the environment safe:    [x] Remove alcohol  [x] Remove drugs  [x] Secure medications  [x] " Dispose of old medications  [] Remove access to firearms  [x] Remove things I could use to hurt myself  [x] Take alternate routes from my usual routine  [x]  Arrange transportation rather than drive myself  [x] Spend time with / be around others  [] Other     Step 7: Professionals or agencies I can contact during a crisis:    [] [facility] Daytime Number: [phone]  [x] Suicide Prevention Lifeline: 9-256-767-JPCR (5221)  [x] Crisis Text Line Service (available 24 hours a day, 7 days a week):   [x] Text [place] to 707211   [x] Local Crisis Services   [x] Call 911 or go to my nearest emergency department.    I helped develop this safety plan and agree to use it when needed. I have been given a copy of this plan.    Client signature    [Verbally agrees via Video Visit]  Taylor Bryan     _________________________________________________________________    Todays date: 10/8/2020        Adapted from Safety Plan Template 2008 Allyn Chan and Lance Caban is reprinted with the express permission of the authors. No portion of the Safety Plan Template may be reproduced without the express, written permission. You can contact the authors at bhs@[facility], located within [facility] or [email or web address].

## 2021-07-04 NOTE — PROGRESS NOTES
"Progress Notes by Jacquelyn Amanda LICSW at 2021  3:46 PM     Author: Jacquelyn Amanda LICSW Service: -- Author Type: Licensed Social Worker    Filed: 7/3/2021  9:21 AM Encounter Date: 2021 Status: Signed    : Jacquelyn Amanda LICSW (Licensed Social Worker)    **Sensitive Note**       Outpatient Mental Health Treatment Plan    Name:  Taylor Bryan  :  1975  MRN:  [id number]    Treatment Plan:  Updated Treatment Plan 21 **  Intake/initial treatment plan date:  10/8/2019  Benefit and risks and alternatives have been discussed: Yes  Is this treatment appropriate with minimal intrusion/restrictions: Yes  Estimated duration of treatment:  Approximately 10+ sessions.  Anticipated frequency of services:  Every 2 weeks  Necessity for frequency: This frequency is needed to establish therapeutic goals and for continuity of care in order to monitor progress.  Necessity for treatment: To address cognitive, behavioral, and/or emotional barriers in order to work toward goals and to improve quality of life.    Session Type: Patient is presenting for an Individual session.    Plan:      ? Depression    Goal:  Decrease average depression level from 4-5  to 3. [Depression sx are triggered around coronavirus public health crisis and associated isolation and stressors. Concerned about self-care: showers infrequently and hasn't brushed her teeth \"for a year\"]   Strategies:           ?[] Decrease social isolation [MET: connecting with AA friends, living in sober house.]     [] Increase involvement in meaningful activities [MET: working part-time, connecting with AA friends]     ?[x] Discuss sleep hygiene     ?[x] Explore thoughts and expectations about self and others     ?[] Process grief (loss of significant person, independence, role,        etc.)     ?[x] Assess for suicide risk     ?[x] Implement physical activity routine (with physician approval)     [x] Consider introduction of bibliotherapy " and/or videos     [x] Continue compliance with medical treatment plan (or explore         barriers)       ?  ?Degree to which this is a problem: 2  Degree to which goal is met: 2  Date of Review: 4/8/21 **          ?   ? Anxiety    Goal:  Decrease average anxiety level from 5-6  to 3. [Patient states symptoms become triggered work stress around pandemic related concerns, financial concerns.  Financial stress and dependency continue to be a significant source of stress. She has occasional panic episodes.]  ]   Strategies: ? [x]Learn and practice relaxation techniques and other coping        strategies (e.g., thought stopping, reframing, meditation)     ? [x] Increase involvement in meaningful activities     ? [] Discuss sleep hygiene     ? [x] Explore thoughts and expectations about self and others     ? [x] Identify and monitor triggers for panic/anxiety symptoms     ? [x] Implement physical activity routine (with physician approval)     ? [x] Consider introduction of bibliotherapy and/or videos     ? [x] Continue compliance with medical treatment plan (or explore          barriers)                                       []     Degree to which this is a problem: 3  Degree to which goal is met: 1  Date of Review: 4/8/21 **    Substance use  Goal:  To be 100% free of substance abuse. [Sober since 3/25/20, actively involved with AA]   Strategies: ? [] Consider referral for chemical dependency evaluation     ? [x] Discuss barriers to participating in AA or other peer-facilitated           groups         [x] Address environmental factors which may interfere with                                                    sobriety     ? [x] Explore short-term versus long-term consequences of use     ? [x] Continue compliance with medical treatment plan (or explore          barriers)     ?  Degree to which this is a problem: 2  Degree to which goal is met: 2  Date of Review: 4/8/21 **       Functional Impairment:  1=Not at  "all/Rarely  2=Some days  3=Most Days  4=Every Day    Personal : 3  Family : 3  Social : 3   Work/school : 2    Diagnosis:  Bipolar I, depressed versus Major Depressive Disorder, moderate to severe  Generalized anxiety Disorder  Social anxiety disorder  Borderline Personality Disorder in partial remission  Alcohol Dependency in early remission  THC dependency in early remission    WHODAS 2.0 12-item version 45%    Scores presented in qualifiers to represent level of disability.  MODERATE Problem (medium, fair, ...) 25-49%    H1= 31  H2= 3  H3= 20      Clinical assessments and measures completed:. KADEN-7 and PHQ-9, [place] Suicide assessment scale     Strengths:  Intelligence, gutsy, tenacious, survivor, kind, knows how to be considerate.  Limitations:  Addiction, dependence on others, laziness, lack of motivation, lack of worth ethic.  Cultural Considerations: Patient raised in a middle class family where Catholicism, \"purity\" and rules to live by. Patient raised and now identifies as a \"[place] witch\" or a \"[place] douglass\".  Higher power is \"gender neutral Universe\".    Persons responsible for this plan: Patient and Provider            Psychotherapist Signature           Patient Signature:              Guardian Signature             Provider: Performed and documented by Jacquelyn Amanda [facility]   Date: 4/8/21 **       "

## 2021-07-04 NOTE — PROGRESS NOTES
"Progress Notes by Jacquelyn Amanda LICSW at 2020  3:25 PM     Author: Jacquelyn Amanda LICSW Service: Behavioral Author Type: Licensed Social Worker    Filed: 7/3/2021  7:45 AM Encounter Date: 2020 Status: Signed    : Jacquelyn Amanda LICSW (Licensed Social Worker)       Outpatient Mental Health Treatment Plan    Name:  Taylor Bryan  :  1975  MRN:  [id number]    Treatment Plan:  Updated Treatment Plan 2020  Intake/initial treatment plan date:  10/8/2019  Benefit and risks and alternatives have been discussed: Yes  Is this treatment appropriate with minimal intrusion/restrictions: Yes  Estimated duration of treatment:  Approximately 10+ sessions.  Anticipated frequency of services:  Every 2 weeks  Necessity for frequency: This frequency is needed to establish therapeutic goals and for continuity of care in order to monitor progress.  Necessity for treatment: To address cognitive, behavioral, and/or emotional barriers in order to work toward goals and to improve quality of life.    Session Type: Patient is presenting for an Individual session.    Plan:      ? Depression    Goal:  Decrease average depression level from 4-5  to 3. [Depression sx are triggered around coronavirus public health crisis and associated isolation and stressors. Concerned about self-care: showers infrequently and hasn't brushed her teeth \"for a year\"]   Strategies:           ?[] Decrease social isolation [MET: connecting with AA friends, living in sober house.]     [] Increase involvement in meaningful activities [MET: working part-time, connecting with AA friends]     ?[x] Discuss sleep hygiene     ?[x] Explore thoughts and expectations about self and others     ?[] Process grief (loss of significant person, independence, role,        etc.)     ?[x] Assess for suicide risk     ?[x] Implement physical activity routine (with physician approval)     [x] Consider introduction of bibliotherapy and/or " videos     [x] Continue compliance with medical treatment plan (or explore         barriers)       ?  ?Degree to which this is a problem: 2  Degree to which goal is met: 2  Date of Review: 12/17/2020            ?   ? Anxiety    Goal:  Decrease average anxiety level from 5-6  to 3. [Patient states symptoms become triggered work stress around pandemic related concerns, financial concerns.  Financial stress and dependency continue to be a significant source of stress. She has occasional panic episodes.]  ]   Strategies: ? [x]Learn and practice relaxation techniques and other coping        strategies (e.g., thought stopping, reframing, meditation)     ? [x] Increase involvement in meaningful activities     ? [] Discuss sleep hygiene     ? [x] Explore thoughts and expectations about self and others     ? [x] Identify and monitor triggers for panic/anxiety symptoms     ? [x] Implement physical activity routine (with physician approval)     ? [x] Consider introduction of bibliotherapy and/or videos     ? [x] Continue compliance with medical treatment plan (or explore          barriers)                                       []     Degree to which this is a problem: 3  Degree to which goal is met: 1  Date of Review: 12/17/2020     Substance use  Goal:  To be 100% free of substance abuse. [Sober since 3/25/20, actively involved with AA]   Strategies: ? [] Consider referral for chemical dependency evaluation     ? [x] Discuss barriers to participating in AA or other peer-facilitated           groups         [x] Address environmental factors which may interfere with                                                    sobriety     ? [x] Explore short-term versus long-term consequences of use     ? [x] Continue compliance with medical treatment plan (or explore          barriers)     ?  Degree to which this is a problem: 2  Degree to which goal is met: 2  Date of Review: 12/17/2020         Functional Impairment:  1=Not at  "all/Rarely  2=Some days  3=Most Days  4=Every Day    Personal : 3  Family : 3  Social : 3   Work/school : 4    Diagnosis:  Bipolar I, depressed versus Major Depressive Disorder, moderate to severe  Generalized anxiety Disorder  Social anxiety disorder  Borderline Personality Disorder in partial remission  Alcohol Dependency in early remission  THC dependency in early remission    WHODAS 2.0 12-item version: Self-administered: 69%       Scores presented in qualifiers to represent level of disability.  SEVERE Problem (high, extreme, ...) 50-95%     H1= 30  H2= 30  H3= 30    Clinical assessments and measures completed:. KADEN-7 and PHQ-9, [place] Suicide assessment scale     Strengths:  Intelligence, gutsy, tenacious, survivor, kind, knows how to be considerate.  Limitations:  Addiction, dependence on others, laziness, lack of motivation, lack of worth ethic.  Cultural Considerations: Patient raised in a middle class family where Catholicism, \"purity\" and rules to live by. Patient raised and now identifies as a \"Episcopalian witch\" or a \"Episcopalian douglass\".  Higher power is \"gender neutral Universe\".    Persons responsible for this plan: Patient and Provider            Psychotherapist Signature           Patient Signature:              Guardian Signature             Provider: Performed and documented by Jacquelyn Amanda [facility]   Date:  12/17/2020       "
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
## 2021-07-29 ENCOUNTER — VIRTUAL VISIT (OUTPATIENT)
Dept: BEHAVIORAL HEALTH | Facility: CLINIC | Age: 46
End: 2021-07-29
Payer: COMMERCIAL

## 2021-07-29 DIAGNOSIS — F10.21 ALCOHOL DEPENDENCE IN EARLY, EARLY PARTIAL, SUSTAINED FULL, OR SUSTAINED PARTIAL REMISSION (H): ICD-10-CM

## 2021-07-29 DIAGNOSIS — F41.1 GENERALIZED ANXIETY DISORDER: Primary | ICD-10-CM

## 2021-07-29 DIAGNOSIS — F60.3 BORDERLINE PERSONALITY DISORDER (H): ICD-10-CM

## 2021-07-29 DIAGNOSIS — F31.32 BIPOLAR AFFECTIVE DISORDER, CURRENTLY DEPRESSED, MODERATE (H): ICD-10-CM

## 2021-07-29 DIAGNOSIS — F12.21 MODERATE TETRAHYDROCANNABINOL (THC) DEPENDENCE IN EARLY REMISSION (H): ICD-10-CM

## 2021-07-29 PROCEDURE — 90834 PSYTX W PT 45 MINUTES: CPT | Mod: 95 | Performed by: SOCIAL WORKER
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
## 2021-08-12 ENCOUNTER — VIRTUAL VISIT (OUTPATIENT)
Dept: BEHAVIORAL HEALTH | Facility: CLINIC | Age: 46
End: 2021-08-12
Payer: COMMERCIAL

## 2021-08-12 DIAGNOSIS — F12.21 MODERATE TETRAHYDROCANNABINOL (THC) DEPENDENCE IN EARLY REMISSION (H): ICD-10-CM

## 2021-08-12 DIAGNOSIS — F31.32 BIPOLAR AFFECTIVE DISORDER, CURRENTLY DEPRESSED, MODERATE (H): ICD-10-CM

## 2021-08-12 DIAGNOSIS — F41.1 GENERALIZED ANXIETY DISORDER: Primary | ICD-10-CM

## 2021-08-12 DIAGNOSIS — F10.21 ALCOHOL DEPENDENCE IN EARLY, EARLY PARTIAL, SUSTAINED FULL, OR SUSTAINED PARTIAL REMISSION (H): ICD-10-CM

## 2021-08-12 DIAGNOSIS — F60.3 BORDERLINE PERSONALITY DISORDER (H): ICD-10-CM

## 2021-08-12 PROCEDURE — 90834 PSYTX W PT 45 MINUTES: CPT | Mod: GT | Performed by: SOCIAL WORKER
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
## 2021-08-19 ENCOUNTER — VIRTUAL VISIT (OUTPATIENT)
Dept: BEHAVIORAL HEALTH | Facility: CLINIC | Age: 46
End: 2021-08-19
Payer: COMMERCIAL

## 2021-08-19 DIAGNOSIS — F10.21 ALCOHOL DEPENDENCE IN EARLY, EARLY PARTIAL, SUSTAINED FULL, OR SUSTAINED PARTIAL REMISSION (H): ICD-10-CM

## 2021-08-19 DIAGNOSIS — F31.32 BIPOLAR AFFECTIVE DISORDER, CURRENTLY DEPRESSED, MODERATE (H): ICD-10-CM

## 2021-08-19 DIAGNOSIS — F12.21 MODERATE TETRAHYDROCANNABINOL (THC) DEPENDENCE IN EARLY REMISSION (H): ICD-10-CM

## 2021-08-19 DIAGNOSIS — F41.1 GENERALIZED ANXIETY DISORDER: Primary | ICD-10-CM

## 2021-08-19 DIAGNOSIS — F60.3 BORDERLINE PERSONALITY DISORDER (H): ICD-10-CM

## 2021-08-19 PROCEDURE — 90834 PSYTX W PT 45 MINUTES: CPT | Mod: 95 | Performed by: SOCIAL WORKER
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
## 2021-08-22 ENCOUNTER — HEALTH MAINTENANCE LETTER (OUTPATIENT)
Age: 46
End: 2021-08-22

## 2021-08-26 ENCOUNTER — VIRTUAL VISIT (OUTPATIENT)
Dept: BEHAVIORAL HEALTH | Facility: CLINIC | Age: 46
End: 2021-08-26
Payer: COMMERCIAL

## 2021-08-26 DIAGNOSIS — F60.3 BORDERLINE PERSONALITY DISORDER (H): ICD-10-CM

## 2021-08-26 DIAGNOSIS — F12.21 MODERATE TETRAHYDROCANNABINOL (THC) DEPENDENCE IN EARLY REMISSION (H): ICD-10-CM

## 2021-08-26 DIAGNOSIS — F10.21 ALCOHOL DEPENDENCE IN EARLY, EARLY PARTIAL, SUSTAINED FULL, OR SUSTAINED PARTIAL REMISSION (H): ICD-10-CM

## 2021-08-26 DIAGNOSIS — F41.1 GENERALIZED ANXIETY DISORDER: Primary | ICD-10-CM

## 2021-08-26 DIAGNOSIS — F31.32 BIPOLAR AFFECTIVE DISORDER, CURRENTLY DEPRESSED, MODERATE (H): ICD-10-CM

## 2021-08-26 PROCEDURE — 90834 PSYTX W PT 45 MINUTES: CPT | Mod: GT | Performed by: SOCIAL WORKER
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
## 2021-09-02 ENCOUNTER — VIRTUAL VISIT (OUTPATIENT)
Dept: BEHAVIORAL HEALTH | Facility: CLINIC | Age: 46
End: 2021-09-02
Payer: COMMERCIAL

## 2021-09-02 DIAGNOSIS — F31.32 BIPOLAR AFFECTIVE DISORDER, CURRENTLY DEPRESSED, MODERATE (H): ICD-10-CM

## 2021-09-02 DIAGNOSIS — F60.3 BORDERLINE PERSONALITY DISORDER (H): ICD-10-CM

## 2021-09-02 DIAGNOSIS — F41.1 GENERALIZED ANXIETY DISORDER: Primary | ICD-10-CM

## 2021-09-02 DIAGNOSIS — F10.21 ALCOHOL DEPENDENCE IN EARLY, EARLY PARTIAL, SUSTAINED FULL, OR SUSTAINED PARTIAL REMISSION (H): ICD-10-CM

## 2021-09-02 DIAGNOSIS — F12.21 MODERATE TETRAHYDROCANNABINOL (THC) DEPENDENCE IN EARLY REMISSION (H): ICD-10-CM

## 2021-09-02 PROCEDURE — 90834 PSYTX W PT 45 MINUTES: CPT | Mod: 95 | Performed by: SOCIAL WORKER
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
## 2021-09-16 ENCOUNTER — VIRTUAL VISIT (OUTPATIENT)
Dept: BEHAVIORAL HEALTH | Facility: CLINIC | Age: 46
End: 2021-09-16
Payer: COMMERCIAL

## 2021-09-16 DIAGNOSIS — F60.3 BORDERLINE PERSONALITY DISORDER (H): ICD-10-CM

## 2021-09-16 DIAGNOSIS — F12.21 MODERATE TETRAHYDROCANNABINOL (THC) DEPENDENCE IN EARLY REMISSION (H): ICD-10-CM

## 2021-09-16 DIAGNOSIS — F10.21 ALCOHOL DEPENDENCE IN EARLY, EARLY PARTIAL, SUSTAINED FULL, OR SUSTAINED PARTIAL REMISSION (H): ICD-10-CM

## 2021-09-16 DIAGNOSIS — F41.1 GENERALIZED ANXIETY DISORDER: Primary | ICD-10-CM

## 2021-09-16 DIAGNOSIS — F31.32 BIPOLAR AFFECTIVE DISORDER, CURRENTLY DEPRESSED, MODERATE (H): ICD-10-CM

## 2021-09-16 PROCEDURE — 90834 PSYTX W PT 45 MINUTES: CPT | Mod: 95 | Performed by: SOCIAL WORKER
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
## 2021-09-30 ENCOUNTER — VIRTUAL VISIT (OUTPATIENT)
Dept: BEHAVIORAL HEALTH | Facility: CLINIC | Age: 46
End: 2021-09-30
Payer: COMMERCIAL

## 2021-09-30 DIAGNOSIS — F10.21 ALCOHOL DEPENDENCE IN EARLY, EARLY PARTIAL, SUSTAINED FULL, OR SUSTAINED PARTIAL REMISSION (H): ICD-10-CM

## 2021-09-30 DIAGNOSIS — F60.3 BORDERLINE PERSONALITY DISORDER (H): ICD-10-CM

## 2021-09-30 DIAGNOSIS — F31.32 BIPOLAR AFFECTIVE DISORDER, CURRENTLY DEPRESSED, MODERATE (H): ICD-10-CM

## 2021-09-30 DIAGNOSIS — F12.21 MODERATE TETRAHYDROCANNABINOL (THC) DEPENDENCE IN EARLY REMISSION (H): ICD-10-CM

## 2021-09-30 DIAGNOSIS — F41.1 GENERALIZED ANXIETY DISORDER: Primary | ICD-10-CM

## 2021-09-30 PROCEDURE — 90834 PSYTX W PT 45 MINUTES: CPT | Mod: GT | Performed by: SOCIAL WORKER
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
## 2021-10-07 ENCOUNTER — VIRTUAL VISIT (OUTPATIENT)
Dept: BEHAVIORAL HEALTH | Facility: CLINIC | Age: 46
End: 2021-10-07
Payer: COMMERCIAL

## 2021-10-07 DIAGNOSIS — F60.3 BORDERLINE PERSONALITY DISORDER (H): ICD-10-CM

## 2021-10-07 DIAGNOSIS — F12.21 MODERATE TETRAHYDROCANNABINOL (THC) DEPENDENCE IN EARLY REMISSION (H): ICD-10-CM

## 2021-10-07 DIAGNOSIS — F10.21 ALCOHOL DEPENDENCE IN EARLY, EARLY PARTIAL, SUSTAINED FULL, OR SUSTAINED PARTIAL REMISSION (H): ICD-10-CM

## 2021-10-07 DIAGNOSIS — F41.1 GENERALIZED ANXIETY DISORDER: Primary | ICD-10-CM

## 2021-10-07 DIAGNOSIS — F31.32 BIPOLAR AFFECTIVE DISORDER, CURRENTLY DEPRESSED, MODERATE (H): ICD-10-CM

## 2021-10-07 PROCEDURE — 90834 PSYTX W PT 45 MINUTES: CPT | Mod: GT | Performed by: SOCIAL WORKER
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
## 2021-10-17 ENCOUNTER — HEALTH MAINTENANCE LETTER (OUTPATIENT)
Age: 46
End: 2021-10-17

## 2021-10-28 ENCOUNTER — VIRTUAL VISIT (OUTPATIENT)
Dept: BEHAVIORAL HEALTH | Facility: CLINIC | Age: 46
End: 2021-10-28
Payer: COMMERCIAL

## 2021-10-28 DIAGNOSIS — F12.21 MODERATE TETRAHYDROCANNABINOL (THC) DEPENDENCE IN EARLY REMISSION (H): ICD-10-CM

## 2021-10-28 DIAGNOSIS — F31.32 BIPOLAR AFFECTIVE DISORDER, CURRENTLY DEPRESSED, MODERATE (H): ICD-10-CM

## 2021-10-28 DIAGNOSIS — F41.1 GENERALIZED ANXIETY DISORDER: Primary | ICD-10-CM

## 2021-10-28 DIAGNOSIS — F10.21 ALCOHOL DEPENDENCE IN EARLY, EARLY PARTIAL, SUSTAINED FULL, OR SUSTAINED PARTIAL REMISSION (H): ICD-10-CM

## 2021-10-28 DIAGNOSIS — F60.3 BORDERLINE PERSONALITY DISORDER (H): ICD-10-CM

## 2021-10-28 PROCEDURE — 90834 PSYTX W PT 45 MINUTES: CPT | Mod: GT,95 | Performed by: SOCIAL WORKER
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
## 2021-11-04 ENCOUNTER — VIRTUAL VISIT (OUTPATIENT)
Dept: BEHAVIORAL HEALTH | Facility: CLINIC | Age: 46
End: 2021-11-04
Payer: COMMERCIAL

## 2021-11-04 DIAGNOSIS — F41.1 GENERALIZED ANXIETY DISORDER: Primary | ICD-10-CM

## 2021-11-04 DIAGNOSIS — F10.21 ALCOHOL DEPENDENCE IN EARLY, EARLY PARTIAL, SUSTAINED FULL, OR SUSTAINED PARTIAL REMISSION (H): ICD-10-CM

## 2021-11-04 DIAGNOSIS — F60.3 BORDERLINE PERSONALITY DISORDER (H): ICD-10-CM

## 2021-11-04 DIAGNOSIS — F31.32 BIPOLAR AFFECTIVE DISORDER, CURRENTLY DEPRESSED, MODERATE (H): ICD-10-CM

## 2021-11-04 DIAGNOSIS — F12.21 MODERATE TETRAHYDROCANNABINOL (THC) DEPENDENCE IN EARLY REMISSION (H): ICD-10-CM

## 2021-11-04 PROCEDURE — 90834 PSYTX W PT 45 MINUTES: CPT | Mod: GT,95 | Performed by: SOCIAL WORKER
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
## 2021-11-18 ENCOUNTER — VIRTUAL VISIT (OUTPATIENT)
Dept: BEHAVIORAL HEALTH | Facility: CLINIC | Age: 46
End: 2021-11-18
Payer: COMMERCIAL

## 2021-11-18 DIAGNOSIS — F41.1 GENERALIZED ANXIETY DISORDER: Primary | ICD-10-CM

## 2021-11-18 DIAGNOSIS — F10.21 ALCOHOL DEPENDENCE IN EARLY, EARLY PARTIAL, SUSTAINED FULL, OR SUSTAINED PARTIAL REMISSION (H): ICD-10-CM

## 2021-11-18 DIAGNOSIS — F31.32 BIPOLAR AFFECTIVE DISORDER, CURRENTLY DEPRESSED, MODERATE (H): ICD-10-CM

## 2021-11-18 DIAGNOSIS — F60.3 BORDERLINE PERSONALITY DISORDER (H): ICD-10-CM

## 2021-11-18 DIAGNOSIS — F12.21 MODERATE TETRAHYDROCANNABINOL (THC) DEPENDENCE IN EARLY REMISSION (H): ICD-10-CM

## 2021-11-18 PROCEDURE — 90834 PSYTX W PT 45 MINUTES: CPT | Mod: GT,95 | Performed by: SOCIAL WORKER
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
## 2021-12-02 ENCOUNTER — VIRTUAL VISIT (OUTPATIENT)
Dept: BEHAVIORAL HEALTH | Facility: CLINIC | Age: 46
End: 2021-12-02
Payer: COMMERCIAL

## 2021-12-02 DIAGNOSIS — F60.3 BORDERLINE PERSONALITY DISORDER (H): ICD-10-CM

## 2021-12-02 DIAGNOSIS — F10.21 ALCOHOL DEPENDENCE IN EARLY, EARLY PARTIAL, SUSTAINED FULL, OR SUSTAINED PARTIAL REMISSION (H): ICD-10-CM

## 2021-12-02 DIAGNOSIS — F12.21 MODERATE TETRAHYDROCANNABINOL (THC) DEPENDENCE IN EARLY REMISSION (H): ICD-10-CM

## 2021-12-02 DIAGNOSIS — F31.32 BIPOLAR AFFECTIVE DISORDER, CURRENTLY DEPRESSED, MODERATE (H): ICD-10-CM

## 2021-12-02 DIAGNOSIS — F41.1 GENERALIZED ANXIETY DISORDER: Primary | ICD-10-CM

## 2021-12-02 DIAGNOSIS — F31.31 BIPOLAR AFFECTIVE DISORDER, CURRENTLY DEPRESSED, MILD (H): ICD-10-CM

## 2021-12-02 PROCEDURE — 90834 PSYTX W PT 45 MINUTES: CPT | Mod: GT,95 | Performed by: SOCIAL WORKER
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
## 2021-12-16 ENCOUNTER — VIRTUAL VISIT (OUTPATIENT)
Dept: BEHAVIORAL HEALTH | Facility: CLINIC | Age: 46
End: 2021-12-16
Payer: COMMERCIAL

## 2021-12-16 DIAGNOSIS — F60.3 BORDERLINE PERSONALITY DISORDER (H): ICD-10-CM

## 2021-12-16 DIAGNOSIS — F12.21 MODERATE TETRAHYDROCANNABINOL (THC) DEPENDENCE IN EARLY REMISSION (H): ICD-10-CM

## 2021-12-16 DIAGNOSIS — F41.1 GENERALIZED ANXIETY DISORDER: Primary | ICD-10-CM

## 2021-12-16 DIAGNOSIS — F10.21 ALCOHOL DEPENDENCE IN EARLY, EARLY PARTIAL, SUSTAINED FULL, OR SUSTAINED PARTIAL REMISSION (H): ICD-10-CM

## 2021-12-16 DIAGNOSIS — F31.31 BIPOLAR AFFECTIVE DISORDER, CURRENTLY DEPRESSED, MILD (H): ICD-10-CM

## 2021-12-16 PROCEDURE — 90834 PSYTX W PT 45 MINUTES: CPT | Mod: GT,95 | Performed by: SOCIAL WORKER

## 2021-12-16 ASSESSMENT — ANXIETY QUESTIONNAIRES
GAD7 TOTAL SCORE: 15
GAD7 TOTAL SCORE: 15
2. NOT BEING ABLE TO STOP OR CONTROL WORRYING: NEARLY EVERY DAY
7. FEELING AFRAID AS IF SOMETHING AWFUL MIGHT HAPPEN: SEVERAL DAYS
6. BECOMING EASILY ANNOYED OR IRRITABLE: SEVERAL DAYS
5. BEING SO RESTLESS THAT IT IS HARD TO SIT STILL: NEARLY EVERY DAY
4. TROUBLE RELAXING: SEVERAL DAYS
7. FEELING AFRAID AS IF SOMETHING AWFUL MIGHT HAPPEN: SEVERAL DAYS
GAD7 TOTAL SCORE: 15
1. FEELING NERVOUS, ANXIOUS, OR ON EDGE: NEARLY EVERY DAY
3. WORRYING TOO MUCH ABOUT DIFFERENT THINGS: NEARLY EVERY DAY

## 2021-12-16 ASSESSMENT — PATIENT HEALTH QUESTIONNAIRE - PHQ9
10. IF YOU CHECKED OFF ANY PROBLEMS, HOW DIFFICULT HAVE THESE PROBLEMS MADE IT FOR YOU TO DO YOUR WORK, TAKE CARE OF THINGS AT HOME, OR GET ALONG WITH OTHER PEOPLE: EXTREMELY DIFFICULT
SUM OF ALL RESPONSES TO PHQ QUESTIONS 1-9: 9
SUM OF ALL RESPONSES TO PHQ QUESTIONS 1-9: 9
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
## 2021-12-17 ASSESSMENT — PATIENT HEALTH QUESTIONNAIRE - PHQ9: SUM OF ALL RESPONSES TO PHQ QUESTIONS 1-9: 9

## 2021-12-17 ASSESSMENT — ANXIETY QUESTIONNAIRES: GAD7 TOTAL SCORE: 15

## 2022-01-27 ENCOUNTER — VIRTUAL VISIT (OUTPATIENT)
Dept: BEHAVIORAL HEALTH | Facility: CLINIC | Age: 47
End: 2022-01-27
Payer: COMMERCIAL

## 2022-01-27 DIAGNOSIS — F60.3 BORDERLINE PERSONALITY DISORDER (H): ICD-10-CM

## 2022-01-27 DIAGNOSIS — F41.1 GENERALIZED ANXIETY DISORDER: Primary | ICD-10-CM

## 2022-01-27 DIAGNOSIS — F10.21 ALCOHOL DEPENDENCE IN EARLY, EARLY PARTIAL, SUSTAINED FULL, OR SUSTAINED PARTIAL REMISSION (H): ICD-10-CM

## 2022-01-27 DIAGNOSIS — F31.31 BIPOLAR AFFECTIVE DISORDER, CURRENTLY DEPRESSED, MILD (H): ICD-10-CM

## 2022-01-27 DIAGNOSIS — F12.21 MODERATE TETRAHYDROCANNABINOL (THC) DEPENDENCE IN EARLY REMISSION (H): ICD-10-CM

## 2022-01-27 PROCEDURE — 90834 PSYTX W PT 45 MINUTES: CPT | Mod: GT,95 | Performed by: SOCIAL WORKER

## 2022-01-27 ASSESSMENT — PATIENT HEALTH QUESTIONNAIRE - PHQ9
SUM OF ALL RESPONSES TO PHQ QUESTIONS 1-9: 12
SUM OF ALL RESPONSES TO PHQ QUESTIONS 1-9: 12
10. IF YOU CHECKED OFF ANY PROBLEMS, HOW DIFFICULT HAVE THESE PROBLEMS MADE IT FOR YOU TO DO YOUR WORK, TAKE CARE OF THINGS AT HOME, OR GET ALONG WITH OTHER PEOPLE: VERY DIFFICULT
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
## 2022-01-28 ASSESSMENT — PATIENT HEALTH QUESTIONNAIRE - PHQ9: SUM OF ALL RESPONSES TO PHQ QUESTIONS 1-9: 12

## 2022-02-24 ENCOUNTER — VIRTUAL VISIT (OUTPATIENT)
Dept: BEHAVIORAL HEALTH | Facility: CLINIC | Age: 47
End: 2022-02-24
Payer: COMMERCIAL

## 2022-02-24 DIAGNOSIS — F12.21 MODERATE TETRAHYDROCANNABINOL (THC) DEPENDENCE IN EARLY REMISSION (H): ICD-10-CM

## 2022-02-24 DIAGNOSIS — F60.3 BORDERLINE PERSONALITY DISORDER (H): ICD-10-CM

## 2022-02-24 DIAGNOSIS — F41.1 GENERALIZED ANXIETY DISORDER: Primary | ICD-10-CM

## 2022-02-24 DIAGNOSIS — F31.31 BIPOLAR AFFECTIVE DISORDER, CURRENTLY DEPRESSED, MILD (H): ICD-10-CM

## 2022-02-24 DIAGNOSIS — F10.21 ALCOHOL DEPENDENCE IN EARLY, EARLY PARTIAL, SUSTAINED FULL, OR SUSTAINED PARTIAL REMISSION (H): ICD-10-CM

## 2022-02-24 PROCEDURE — 90834 PSYTX W PT 45 MINUTES: CPT | Mod: GT,95 | Performed by: SOCIAL WORKER

## 2022-02-24 ASSESSMENT — PATIENT HEALTH QUESTIONNAIRE - PHQ9
SUM OF ALL RESPONSES TO PHQ QUESTIONS 1-9: 16
10. IF YOU CHECKED OFF ANY PROBLEMS, HOW DIFFICULT HAVE THESE PROBLEMS MADE IT FOR YOU TO DO YOUR WORK, TAKE CARE OF THINGS AT HOME, OR GET ALONG WITH OTHER PEOPLE: EXTREMELY DIFFICULT
SUM OF ALL RESPONSES TO PHQ QUESTIONS 1-9: 16
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
## 2022-02-25 ASSESSMENT — PATIENT HEALTH QUESTIONNAIRE - PHQ9: SUM OF ALL RESPONSES TO PHQ QUESTIONS 1-9: 16

## 2022-03-10 ENCOUNTER — VIRTUAL VISIT (OUTPATIENT)
Dept: BEHAVIORAL HEALTH | Facility: CLINIC | Age: 47
End: 2022-03-10
Payer: COMMERCIAL

## 2022-03-10 DIAGNOSIS — F41.1 GENERALIZED ANXIETY DISORDER: Primary | ICD-10-CM

## 2022-03-10 DIAGNOSIS — F10.21 ALCOHOL DEPENDENCE IN EARLY, EARLY PARTIAL, SUSTAINED FULL, OR SUSTAINED PARTIAL REMISSION (H): ICD-10-CM

## 2022-03-10 DIAGNOSIS — F12.21 MODERATE TETRAHYDROCANNABINOL (THC) DEPENDENCE IN EARLY REMISSION (H): ICD-10-CM

## 2022-03-10 DIAGNOSIS — F60.3 BORDERLINE PERSONALITY DISORDER (H): ICD-10-CM

## 2022-03-10 DIAGNOSIS — F31.31 BIPOLAR AFFECTIVE DISORDER, CURRENTLY DEPRESSED, MILD (H): ICD-10-CM

## 2022-03-10 PROCEDURE — 90834 PSYTX W PT 45 MINUTES: CPT | Mod: GT,95 | Performed by: SOCIAL WORKER

## 2022-03-10 ASSESSMENT — PATIENT HEALTH QUESTIONNAIRE - PHQ9
SUM OF ALL RESPONSES TO PHQ QUESTIONS 1-9: 13
10. IF YOU CHECKED OFF ANY PROBLEMS, HOW DIFFICULT HAVE THESE PROBLEMS MADE IT FOR YOU TO DO YOUR WORK, TAKE CARE OF THINGS AT HOME, OR GET ALONG WITH OTHER PEOPLE: EXTREMELY DIFFICULT
SUM OF ALL RESPONSES TO PHQ QUESTIONS 1-9: 13
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
## 2022-03-11 ASSESSMENT — PATIENT HEALTH QUESTIONNAIRE - PHQ9: SUM OF ALL RESPONSES TO PHQ QUESTIONS 1-9: 13

## 2022-03-24 ENCOUNTER — VIRTUAL VISIT (OUTPATIENT)
Dept: BEHAVIORAL HEALTH | Facility: CLINIC | Age: 47
End: 2022-03-24
Payer: COMMERCIAL

## 2022-03-24 DIAGNOSIS — F10.21 ALCOHOL DEPENDENCE IN EARLY, EARLY PARTIAL, SUSTAINED FULL, OR SUSTAINED PARTIAL REMISSION (H): ICD-10-CM

## 2022-03-24 DIAGNOSIS — F60.3 BORDERLINE PERSONALITY DISORDER (H): ICD-10-CM

## 2022-03-24 DIAGNOSIS — F12.21 MODERATE TETRAHYDROCANNABINOL (THC) DEPENDENCE IN EARLY REMISSION (H): ICD-10-CM

## 2022-03-24 DIAGNOSIS — F31.31 BIPOLAR AFFECTIVE DISORDER, CURRENTLY DEPRESSED, MILD (H): ICD-10-CM

## 2022-03-24 DIAGNOSIS — F41.1 GENERALIZED ANXIETY DISORDER: Primary | ICD-10-CM

## 2022-03-24 PROCEDURE — 90834 PSYTX W PT 45 MINUTES: CPT | Mod: 95 | Performed by: SOCIAL WORKER

## 2022-03-24 ASSESSMENT — ANXIETY QUESTIONNAIRES
5. BEING SO RESTLESS THAT IT IS HARD TO SIT STILL: SEVERAL DAYS
GAD7 TOTAL SCORE: 17
2. NOT BEING ABLE TO STOP OR CONTROL WORRYING: NEARLY EVERY DAY
GAD7 TOTAL SCORE: 17
7. FEELING AFRAID AS IF SOMETHING AWFUL MIGHT HAPPEN: NEARLY EVERY DAY
GAD7 TOTAL SCORE: 17
6. BECOMING EASILY ANNOYED OR IRRITABLE: NEARLY EVERY DAY
1. FEELING NERVOUS, ANXIOUS, OR ON EDGE: NEARLY EVERY DAY
3. WORRYING TOO MUCH ABOUT DIFFERENT THINGS: NEARLY EVERY DAY
4. TROUBLE RELAXING: SEVERAL DAYS
7. FEELING AFRAID AS IF SOMETHING AWFUL MIGHT HAPPEN: NEARLY EVERY DAY

## 2022-03-24 ASSESSMENT — PATIENT HEALTH QUESTIONNAIRE - PHQ9
SUM OF ALL RESPONSES TO PHQ QUESTIONS 1-9: 12
10. IF YOU CHECKED OFF ANY PROBLEMS, HOW DIFFICULT HAVE THESE PROBLEMS MADE IT FOR YOU TO DO YOUR WORK, TAKE CARE OF THINGS AT HOME, OR GET ALONG WITH OTHER PEOPLE: EXTREMELY DIFFICULT
SUM OF ALL RESPONSES TO PHQ QUESTIONS 1-9: 12
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
## 2022-03-25 ASSESSMENT — PATIENT HEALTH QUESTIONNAIRE - PHQ9: SUM OF ALL RESPONSES TO PHQ QUESTIONS 1-9: 12

## 2022-03-25 ASSESSMENT — ANXIETY QUESTIONNAIRES: GAD7 TOTAL SCORE: 17

## 2022-04-14 ENCOUNTER — VIRTUAL VISIT (OUTPATIENT)
Dept: BEHAVIORAL HEALTH | Facility: CLINIC | Age: 47
End: 2022-04-14
Payer: COMMERCIAL

## 2022-04-14 DIAGNOSIS — F60.3 BORDERLINE PERSONALITY DISORDER (H): ICD-10-CM

## 2022-04-14 DIAGNOSIS — F10.21 ALCOHOL DEPENDENCE IN EARLY, EARLY PARTIAL, SUSTAINED FULL, OR SUSTAINED PARTIAL REMISSION (H): ICD-10-CM

## 2022-04-14 DIAGNOSIS — F12.21 MODERATE TETRAHYDROCANNABINOL (THC) DEPENDENCE IN EARLY REMISSION (H): ICD-10-CM

## 2022-04-14 DIAGNOSIS — F31.31 BIPOLAR AFFECTIVE DISORDER, CURRENTLY DEPRESSED, MILD (H): ICD-10-CM

## 2022-04-14 DIAGNOSIS — F41.1 GENERALIZED ANXIETY DISORDER: Primary | ICD-10-CM

## 2022-04-14 PROCEDURE — 90834 PSYTX W PT 45 MINUTES: CPT | Mod: 95 | Performed by: SOCIAL WORKER
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
## 2022-04-21 ENCOUNTER — VIRTUAL VISIT (OUTPATIENT)
Dept: BEHAVIORAL HEALTH | Facility: CLINIC | Age: 47
End: 2022-04-21
Attending: PSYCHIATRY & NEUROLOGY
Payer: COMMERCIAL

## 2022-04-21 DIAGNOSIS — F41.1 GENERALIZED ANXIETY DISORDER: Primary | ICD-10-CM

## 2022-04-21 DIAGNOSIS — F31.31 BIPOLAR AFFECTIVE DISORDER, CURRENTLY DEPRESSED, MILD (H): ICD-10-CM

## 2022-04-21 DIAGNOSIS — F60.3 BORDERLINE PERSONALITY DISORDER (H): ICD-10-CM

## 2022-04-21 DIAGNOSIS — F10.21 ALCOHOL DEPENDENCE IN EARLY, EARLY PARTIAL, SUSTAINED FULL, OR SUSTAINED PARTIAL REMISSION (H): ICD-10-CM

## 2022-04-21 PROCEDURE — 90834 PSYTX W PT 45 MINUTES: CPT | Mod: 95 | Performed by: SOCIAL WORKER

## 2022-04-21 ASSESSMENT — PATIENT HEALTH QUESTIONNAIRE - PHQ9
SUM OF ALL RESPONSES TO PHQ QUESTIONS 1-9: 4
SUM OF ALL RESPONSES TO PHQ QUESTIONS 1-9: 4
10. IF YOU CHECKED OFF ANY PROBLEMS, HOW DIFFICULT HAVE THESE PROBLEMS MADE IT FOR YOU TO DO YOUR WORK, TAKE CARE OF THINGS AT HOME, OR GET ALONG WITH OTHER PEOPLE: SOMEWHAT DIFFICULT
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
## 2022-04-22 ASSESSMENT — PATIENT HEALTH QUESTIONNAIRE - PHQ9: SUM OF ALL RESPONSES TO PHQ QUESTIONS 1-9: 4

## 2022-05-05 ENCOUNTER — VIRTUAL VISIT (OUTPATIENT)
Dept: BEHAVIORAL HEALTH | Facility: CLINIC | Age: 47
End: 2022-05-05
Attending: PSYCHIATRY & NEUROLOGY
Payer: COMMERCIAL

## 2022-05-05 DIAGNOSIS — F60.3 BORDERLINE PERSONALITY DISORDER (H): ICD-10-CM

## 2022-05-05 DIAGNOSIS — F31.32 BIPOLAR AFFECTIVE DISORDER, CURRENTLY DEPRESSED, MODERATE (H): ICD-10-CM

## 2022-05-05 DIAGNOSIS — F41.1 GENERALIZED ANXIETY DISORDER: Primary | ICD-10-CM

## 2022-05-05 DIAGNOSIS — F31.31 BIPOLAR AFFECTIVE DISORDER, CURRENTLY DEPRESSED, MILD (H): ICD-10-CM

## 2022-05-05 DIAGNOSIS — F12.21 MODERATE TETRAHYDROCANNABINOL (THC) DEPENDENCE IN EARLY REMISSION (H): ICD-10-CM

## 2022-05-05 DIAGNOSIS — F10.21 ALCOHOL DEPENDENCE IN EARLY, EARLY PARTIAL, SUSTAINED FULL, OR SUSTAINED PARTIAL REMISSION (H): ICD-10-CM

## 2022-05-05 PROCEDURE — 90834 PSYTX W PT 45 MINUTES: CPT | Mod: 95 | Performed by: SOCIAL WORKER

## 2022-05-05 ASSESSMENT — PATIENT HEALTH QUESTIONNAIRE - PHQ9
SUM OF ALL RESPONSES TO PHQ QUESTIONS 1-9: 10
10. IF YOU CHECKED OFF ANY PROBLEMS, HOW DIFFICULT HAVE THESE PROBLEMS MADE IT FOR YOU TO DO YOUR WORK, TAKE CARE OF THINGS AT HOME, OR GET ALONG WITH OTHER PEOPLE: EXTREMELY DIFFICULT
SUM OF ALL RESPONSES TO PHQ QUESTIONS 1-9: 10
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
## 2022-05-06 ASSESSMENT — PATIENT HEALTH QUESTIONNAIRE - PHQ9: SUM OF ALL RESPONSES TO PHQ QUESTIONS 1-9: 10

## 2022-05-19 ENCOUNTER — VIRTUAL VISIT (OUTPATIENT)
Dept: BEHAVIORAL HEALTH | Facility: CLINIC | Age: 47
End: 2022-05-19
Attending: PSYCHIATRY & NEUROLOGY
Payer: COMMERCIAL

## 2022-05-19 DIAGNOSIS — F41.1 GENERALIZED ANXIETY DISORDER: Primary | ICD-10-CM

## 2022-05-19 DIAGNOSIS — F10.21 ALCOHOL DEPENDENCE IN EARLY, EARLY PARTIAL, SUSTAINED FULL, OR SUSTAINED PARTIAL REMISSION (H): ICD-10-CM

## 2022-05-19 DIAGNOSIS — F12.21 MODERATE TETRAHYDROCANNABINOL (THC) DEPENDENCE IN EARLY REMISSION (H): ICD-10-CM

## 2022-05-19 DIAGNOSIS — F60.3 BORDERLINE PERSONALITY DISORDER (H): ICD-10-CM

## 2022-05-19 DIAGNOSIS — F31.31 BIPOLAR AFFECTIVE DISORDER, CURRENTLY DEPRESSED, MILD (H): ICD-10-CM

## 2022-05-19 PROCEDURE — 90834 PSYTX W PT 45 MINUTES: CPT | Mod: 95 | Performed by: SOCIAL WORKER

## 2022-05-19 ASSESSMENT — PATIENT HEALTH QUESTIONNAIRE - PHQ9
SUM OF ALL RESPONSES TO PHQ QUESTIONS 1-9: 11
SUM OF ALL RESPONSES TO PHQ QUESTIONS 1-9: 11
10. IF YOU CHECKED OFF ANY PROBLEMS, HOW DIFFICULT HAVE THESE PROBLEMS MADE IT FOR YOU TO DO YOUR WORK, TAKE CARE OF THINGS AT HOME, OR GET ALONG WITH OTHER PEOPLE: EXTREMELY DIFFICULT
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
## 2022-06-02 ENCOUNTER — VIRTUAL VISIT (OUTPATIENT)
Dept: BEHAVIORAL HEALTH | Facility: CLINIC | Age: 47
End: 2022-06-02
Attending: PSYCHIATRY & NEUROLOGY
Payer: COMMERCIAL

## 2022-06-02 DIAGNOSIS — F41.1 GENERALIZED ANXIETY DISORDER: Primary | ICD-10-CM

## 2022-06-02 DIAGNOSIS — F60.3 BORDERLINE PERSONALITY DISORDER (H): ICD-10-CM

## 2022-06-02 DIAGNOSIS — F31.31 BIPOLAR AFFECTIVE DISORDER, CURRENTLY DEPRESSED, MILD (H): ICD-10-CM

## 2022-06-02 DIAGNOSIS — F10.21 ALCOHOL DEPENDENCE IN EARLY, EARLY PARTIAL, SUSTAINED FULL, OR SUSTAINED PARTIAL REMISSION (H): ICD-10-CM

## 2022-06-02 DIAGNOSIS — F12.21 MODERATE TETRAHYDROCANNABINOL (THC) DEPENDENCE IN EARLY REMISSION (H): ICD-10-CM

## 2022-06-02 PROCEDURE — 90834 PSYTX W PT 45 MINUTES: CPT | Mod: 95 | Performed by: SOCIAL WORKER

## 2022-06-02 ASSESSMENT — PATIENT HEALTH QUESTIONNAIRE - PHQ9
10. IF YOU CHECKED OFF ANY PROBLEMS, HOW DIFFICULT HAVE THESE PROBLEMS MADE IT FOR YOU TO DO YOUR WORK, TAKE CARE OF THINGS AT HOME, OR GET ALONG WITH OTHER PEOPLE: EXTREMELY DIFFICULT
SUM OF ALL RESPONSES TO PHQ QUESTIONS 1-9: 11
SUM OF ALL RESPONSES TO PHQ QUESTIONS 1-9: 11
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
## 2022-06-16 ENCOUNTER — VIRTUAL VISIT (OUTPATIENT)
Dept: BEHAVIORAL HEALTH | Facility: CLINIC | Age: 47
End: 2022-06-16
Attending: PSYCHIATRY & NEUROLOGY
Payer: COMMERCIAL

## 2022-06-16 DIAGNOSIS — F12.21 MODERATE TETRAHYDROCANNABINOL (THC) DEPENDENCE IN EARLY REMISSION (H): ICD-10-CM

## 2022-06-16 DIAGNOSIS — F31.31 BIPOLAR AFFECTIVE DISORDER, CURRENTLY DEPRESSED, MILD (H): ICD-10-CM

## 2022-06-16 DIAGNOSIS — F41.1 GENERALIZED ANXIETY DISORDER: Primary | ICD-10-CM

## 2022-06-16 DIAGNOSIS — F60.3 BORDERLINE PERSONALITY DISORDER (H): ICD-10-CM

## 2022-06-16 DIAGNOSIS — F10.21 ALCOHOL DEPENDENCE IN EARLY, EARLY PARTIAL, SUSTAINED FULL, OR SUSTAINED PARTIAL REMISSION (H): ICD-10-CM

## 2022-06-16 PROCEDURE — 90834 PSYTX W PT 45 MINUTES: CPT | Mod: 95 | Performed by: SOCIAL WORKER

## 2022-06-16 ASSESSMENT — PATIENT HEALTH QUESTIONNAIRE - PHQ9
SUM OF ALL RESPONSES TO PHQ QUESTIONS 1-9: 6
10. IF YOU CHECKED OFF ANY PROBLEMS, HOW DIFFICULT HAVE THESE PROBLEMS MADE IT FOR YOU TO DO YOUR WORK, TAKE CARE OF THINGS AT HOME, OR GET ALONG WITH OTHER PEOPLE: VERY DIFFICULT
SUM OF ALL RESPONSES TO PHQ QUESTIONS 1-9: 6
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
## 2022-06-16 NOTE — PROGRESS NOTES
Mental Health Visit Note     Patient: Taylor Bryan    : 1975 MRN: [id number]    12/3/2020    Start time: 3:09pm   Stop Time: 3:54pm  Session # 27    Session Type: Patient is presenting for an Individual session.    Taylor Bryan is a 45 y.o. female is being seen today for   Chief Complaint   Patient presents with     MH Follow Up     Video visit     Anxiety     anxiety in the context of household expectations     Depression     low mood with inability to keep up with household expectations     Alcohol Problem     in early remission     Addiction     in early remission   .     Telemedicine Visit: The patient's condition can be safely assessed and treated via synchronous audio and visual telemedicine encounter.      Reason for Telemedicine Visit: Services only offered telehealth    Originating Site (Patient Location): Patient's home    Distant Site (Provider Location): [facility]: [facility]    Consent:  The patient/guardian has verbally consented to: the potential risks and benefits of telemedicine (video visit) versus in person care; bill my insurance or make self-payment for services provided; and responsibility for payment of non-covered services.     Mode of Communication:  Video Conference via [employer]    As the provider I attest to compliance with applicable laws and regulations related to telemedicine.    Those present for this visit Jacquelyn GARCIA, Taylor Zayda    Follow up in regards to ongoing symptom management of anxiety and depression, anger and rage issues    New symptoms or complaints: None    Functional Impairment:   Personal: 3  Family: 2  Social: 2-3  Work: 3    Clinical assessment of mental status:   Taylor Bryan presented on time.   She was oriented x3, open and cooperative, and dressed appropriately for this session and weather. Her memory was Normal cognitive functioning .  Her speech was  Pressured.  Language was logical and coherent.   "Concentration and focus is Within normal. Psychosis is not noted or reported. She reports her mood is Anxious and Depressed.  Affect is congruent with speech and is Anxious and Depressed.  Fund of knowledge is adequate. Insight is adequate for therapy.    Suicidal/Homicidal Ideation present: Patient endorses passive SI since last session.  \"Sometimes I just want to die, I don't want to experience the emotions anymore.\"  Denies plan or intent.      Patient's impression of their current status:   \"I'm so anxious about it.\"  \"I just don't want to do it, but I could get kicked out of my housing.\"  Patient reports mood is anxious and labile.  She processes thoughts and  feelings around her difficulty with house chores required in her sober housing situation. Waiting until the day to do it all results in back pain.    Therapist impression of patients current state:   This 45 year old  female with long history of anxiety, depression, borderline personality issues presents for telemedicine psychotherapy video visit. Patient is open and cooperative in session.  Patient would decompensate without regular therapy sessions to support the use of coping and emotional regulation skills.   Patient utilizes session to process her difficulty with required chores in her new household.  Patient appears to have good judgment about the possible consequences of choosing not to participate but struggles with internal motivation, problem-solving to reduce negative affect physically and emotionally. Processed negative cognitions, reinforced strengths, validated patient efforts and feelings. Conducted solution based process to assist patient in identifying things she can do to reduce outcome of physical pain, and time spent worrying about the upcoming tasks.  Patient able to identify that she would suffer less overall if she could break up tasks during the week so that they don't have to be done all at once.    Assessment tools used: " "not today     Type of psychotherapeutic technique provided: Client centered, Solution-focused, CBT and DBT     Progress toward short term goals:Progress as expected,   Patient utilized extra session for urgent needs around emotional dysregulation.  She did not journal but made a \"mental list\" around stressors, positive and negative thoughts regarding her situation. Facilitated her move to old sober house where she feels she will have less pressure overall \"to perform\". Continues to connect with AA friends, remains sober from alcohol and drugs.    Review of long term goals: Not done at today's visit . Date of last review 8/27/20. **Due next session**.    Diagnosis:   1. Generalized anxiety disorder    2. Bipolar affective disorder, currently depressed, moderate (H)    3. Borderline personality disorder (H)    4. Alcohol dependence in early, early partial, sustained full, or sustained partial remission (H)    5. Moderate tetrahydrocannabinol (THC) dependence in early remission (H)    **slightly worsened with psychosocial stressors    Plan and Follow-up: Patient will return for follow up in two weeks.  She will complete cleaning as required to keep her housing, will make a list of what things she can do to make it easier to break down tasks in manageable chunks. She will approach the task without shame, allowing herself to do it last minute if she wants but leaving space to consider doing these tasks on a more moderate schedule that allows her personal time to complete with less stress and pain.  Patient will continue to make use of AA group support, remain free of substance abuse.    Discharge Criteria/Planning: Client has chronic symptoms and ongoing therapy for maintenance stability recommended. and Patient will continue with follow-up until therapy can be discontinued without return of signs and symptoms.    I have reviewed the note as documented above.  This accurately captures the substance of my conversation " with the patient.    As the provider I attest to compliance with applicable laws and regulations related to telemedicine.  Performed and documented by Jacquelyn Amanda [facility]  12/3/2020   No

## 2022-06-30 ENCOUNTER — VIRTUAL VISIT (OUTPATIENT)
Dept: BEHAVIORAL HEALTH | Facility: CLINIC | Age: 47
End: 2022-06-30
Attending: PSYCHIATRY & NEUROLOGY
Payer: COMMERCIAL

## 2022-06-30 DIAGNOSIS — F10.21 ALCOHOL DEPENDENCE IN EARLY, EARLY PARTIAL, SUSTAINED FULL, OR SUSTAINED PARTIAL REMISSION (H): ICD-10-CM

## 2022-06-30 DIAGNOSIS — F41.1 GENERALIZED ANXIETY DISORDER: Primary | ICD-10-CM

## 2022-06-30 DIAGNOSIS — F12.21 MODERATE TETRAHYDROCANNABINOL (THC) DEPENDENCE IN EARLY REMISSION (H): ICD-10-CM

## 2022-06-30 DIAGNOSIS — F60.3 BORDERLINE PERSONALITY DISORDER (H): ICD-10-CM

## 2022-06-30 DIAGNOSIS — F31.31 BIPOLAR AFFECTIVE DISORDER, CURRENTLY DEPRESSED, MILD (H): ICD-10-CM

## 2022-06-30 PROCEDURE — 90834 PSYTX W PT 45 MINUTES: CPT | Mod: 95 | Performed by: SOCIAL WORKER

## 2022-06-30 ASSESSMENT — PATIENT HEALTH QUESTIONNAIRE - PHQ9
10. IF YOU CHECKED OFF ANY PROBLEMS, HOW DIFFICULT HAVE THESE PROBLEMS MADE IT FOR YOU TO DO YOUR WORK, TAKE CARE OF THINGS AT HOME, OR GET ALONG WITH OTHER PEOPLE: SOMEWHAT DIFFICULT
SUM OF ALL RESPONSES TO PHQ QUESTIONS 1-9: 7
SUM OF ALL RESPONSES TO PHQ QUESTIONS 1-9: 7

## 2022-07-14 ENCOUNTER — VIRTUAL VISIT (OUTPATIENT)
Dept: PSYCHOLOGY | Facility: CLINIC | Age: 47
End: 2022-07-14
Attending: PSYCHIATRY & NEUROLOGY
Payer: COMMERCIAL

## 2022-07-14 DIAGNOSIS — F31.31 BIPOLAR AFFECTIVE DISORDER, DEPRESSED, MILD (H): ICD-10-CM

## 2022-07-14 DIAGNOSIS — F60.3 BORDERLINE PERSONALITY DISORDER IN ADULT (H): ICD-10-CM

## 2022-07-14 DIAGNOSIS — F41.1 GENERALIZED ANXIETY DISORDER: Primary | ICD-10-CM

## 2022-07-14 DIAGNOSIS — F12.21 MODERATE TETRAHYDROCANNABINOL (THC) DEPENDENCE IN EARLY REMISSION (H): ICD-10-CM

## 2022-07-14 DIAGNOSIS — F10.21 MODERATE ALCOHOL DEPENDENCE IN EARLY REMISSION (H): ICD-10-CM

## 2022-07-14 PROCEDURE — 90834 PSYTX W PT 45 MINUTES: CPT | Mod: 95 | Performed by: SOCIAL WORKER
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
## 2022-07-24 ENCOUNTER — HEALTH MAINTENANCE LETTER (OUTPATIENT)
Age: 47
End: 2022-07-24

## 2022-09-12 ASSESSMENT — ANXIETY QUESTIONNAIRES
GAD7 TOTAL SCORE: 19
7. FEELING AFRAID AS IF SOMETHING AWFUL MIGHT HAPPEN: NEARLY EVERY DAY
5. BEING SO RESTLESS THAT IT IS HARD TO SIT STILL: SEVERAL DAYS
IF YOU CHECKED OFF ANY PROBLEMS ON THIS QUESTIONNAIRE, HOW DIFFICULT HAVE THESE PROBLEMS MADE IT FOR YOU TO DO YOUR WORK, TAKE CARE OF THINGS AT HOME, OR GET ALONG WITH OTHER PEOPLE: EXTREMELY DIFFICULT
8. IF YOU CHECKED OFF ANY PROBLEMS, HOW DIFFICULT HAVE THESE MADE IT FOR YOU TO DO YOUR WORK, TAKE CARE OF THINGS AT HOME, OR GET ALONG WITH OTHER PEOPLE?: EXTREMELY DIFFICULT
1. FEELING NERVOUS, ANXIOUS, OR ON EDGE: NEARLY EVERY DAY
6. BECOMING EASILY ANNOYED OR IRRITABLE: NEARLY EVERY DAY
4. TROUBLE RELAXING: NEARLY EVERY DAY
GAD7 TOTAL SCORE: 19
2. NOT BEING ABLE TO STOP OR CONTROL WORRYING: NEARLY EVERY DAY
3. WORRYING TOO MUCH ABOUT DIFFERENT THINGS: NEARLY EVERY DAY
GAD7 TOTAL SCORE: 19
7. FEELING AFRAID AS IF SOMETHING AWFUL MIGHT HAPPEN: NEARLY EVERY DAY

## 2022-09-15 ENCOUNTER — VIRTUAL VISIT (OUTPATIENT)
Dept: PSYCHOLOGY | Facility: CLINIC | Age: 47
End: 2022-09-15
Payer: COMMERCIAL

## 2022-09-15 DIAGNOSIS — F60.3 BORDERLINE PERSONALITY DISORDER IN ADULT (H): ICD-10-CM

## 2022-09-15 DIAGNOSIS — F31.31 BIPOLAR AFFECTIVE DISORDER, DEPRESSED, MILD (H): ICD-10-CM

## 2022-09-15 DIAGNOSIS — F41.1 GENERALIZED ANXIETY DISORDER: Primary | ICD-10-CM

## 2022-09-15 DIAGNOSIS — F10.21 MODERATE ALCOHOL DEPENDENCE IN EARLY REMISSION (H): ICD-10-CM

## 2022-09-15 PROCEDURE — 90834 PSYTX W PT 45 MINUTES: CPT | Mod: 95
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
## 2022-09-29 ENCOUNTER — VIRTUAL VISIT (OUTPATIENT)
Dept: PSYCHOLOGY | Facility: CLINIC | Age: 47
End: 2022-09-29
Payer: COMMERCIAL

## 2022-09-29 DIAGNOSIS — F31.31 BIPOLAR AFFECTIVE DISORDER, DEPRESSED, MILD (H): Primary | ICD-10-CM

## 2022-09-29 DIAGNOSIS — F10.21 MODERATE ALCOHOL DEPENDENCE IN EARLY REMISSION (H): ICD-10-CM

## 2022-09-29 DIAGNOSIS — F41.1 GENERALIZED ANXIETY DISORDER: ICD-10-CM

## 2022-09-29 DIAGNOSIS — F60.3 BORDERLINE PERSONALITY DISORDER IN ADULT (H): ICD-10-CM

## 2022-09-29 PROCEDURE — 90834 PSYTX W PT 45 MINUTES: CPT | Mod: 95
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
## 2022-10-02 ENCOUNTER — HEALTH MAINTENANCE LETTER (OUTPATIENT)
Age: 47
End: 2022-10-02

## 2022-10-17 ENCOUNTER — VIRTUAL VISIT (OUTPATIENT)
Dept: PSYCHOLOGY | Facility: CLINIC | Age: 47
End: 2022-10-17
Payer: COMMERCIAL

## 2022-10-17 ENCOUNTER — TELEPHONE (OUTPATIENT)
Dept: PSYCHOLOGY | Facility: CLINIC | Age: 47
End: 2022-10-17
Payer: COMMERCIAL

## 2022-10-17 DIAGNOSIS — F41.1 GENERALIZED ANXIETY DISORDER: Primary | ICD-10-CM

## 2022-10-17 DIAGNOSIS — F84.0 AUTISM SPECTRUM DISORDER: ICD-10-CM

## 2022-10-17 DIAGNOSIS — F60.3 BORDERLINE PERSONALITY DISORDER IN ADULT (H): ICD-10-CM

## 2022-10-17 DIAGNOSIS — F31.31 BIPOLAR AFFECTIVE DISORDER, DEPRESSED, MILD (H): ICD-10-CM

## 2022-10-17 DIAGNOSIS — F10.21 MODERATE ALCOHOL DEPENDENCE IN EARLY REMISSION (H): ICD-10-CM

## 2022-10-17 PROCEDURE — 90834 PSYTX W PT 45 MINUTES: CPT | Mod: 95

## 2022-10-17 ASSESSMENT — COLUMBIA-SUICIDE SEVERITY RATING SCALE - C-SSRS
2. HAVE YOU ACTUALLY HAD ANY THOUGHTS OF KILLING YOURSELF?: NO
1. HAVE YOU WISHED YOU WERE DEAD OR WISHED YOU COULD GO TO SLEEP AND NOT WAKE UP?: NO

## 2022-10-17 ASSESSMENT — PATIENT HEALTH QUESTIONNAIRE - PHQ9
SUM OF ALL RESPONSES TO PHQ QUESTIONS 1-9: 13
SUM OF ALL RESPONSES TO PHQ QUESTIONS 1-9: 13
10. IF YOU CHECKED OFF ANY PROBLEMS, HOW DIFFICULT HAVE THESE PROBLEMS MADE IT FOR YOU TO DO YOUR WORK, TAKE CARE OF THINGS AT HOME, OR GET ALONG WITH OTHER PEOPLE: EXTREMELY DIFFICULT
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
## 2022-10-24 ENCOUNTER — VIRTUAL VISIT (OUTPATIENT)
Dept: PSYCHOLOGY | Facility: CLINIC | Age: 47
End: 2022-10-24
Payer: COMMERCIAL

## 2022-10-24 DIAGNOSIS — F60.3 BORDERLINE PERSONALITY DISORDER IN ADULT (H): ICD-10-CM

## 2022-10-24 DIAGNOSIS — F10.21 MODERATE ALCOHOL DEPENDENCE IN EARLY REMISSION (H): ICD-10-CM

## 2022-10-24 DIAGNOSIS — F84.0 AUTISM SPECTRUM DISORDER: ICD-10-CM

## 2022-10-24 DIAGNOSIS — F31.31 BIPOLAR AFFECTIVE DISORDER, DEPRESSED, MILD (H): ICD-10-CM

## 2022-10-24 DIAGNOSIS — F41.1 GENERALIZED ANXIETY DISORDER: Primary | ICD-10-CM

## 2022-10-24 PROCEDURE — 90834 PSYTX W PT 45 MINUTES: CPT | Mod: 95

## 2022-10-24 ASSESSMENT — PATIENT HEALTH QUESTIONNAIRE - PHQ9
SUM OF ALL RESPONSES TO PHQ QUESTIONS 1-9: 12
SUM OF ALL RESPONSES TO PHQ QUESTIONS 1-9: 12
10. IF YOU CHECKED OFF ANY PROBLEMS, HOW DIFFICULT HAVE THESE PROBLEMS MADE IT FOR YOU TO DO YOUR WORK, TAKE CARE OF THINGS AT HOME, OR GET ALONG WITH OTHER PEOPLE: EXTREMELY DIFFICULT
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
## 2022-11-17 ENCOUNTER — VIRTUAL VISIT (OUTPATIENT)
Dept: PSYCHOLOGY | Facility: CLINIC | Age: 47
End: 2022-11-17
Payer: COMMERCIAL

## 2022-11-17 DIAGNOSIS — F84.0 AUTISM SPECTRUM DISORDER: ICD-10-CM

## 2022-11-17 DIAGNOSIS — F12.21 MODERATE TETRAHYDROCANNABINOL (THC) DEPENDENCE IN EARLY REMISSION (H): ICD-10-CM

## 2022-11-17 DIAGNOSIS — F10.21 MODERATE ALCOHOL DEPENDENCE IN EARLY REMISSION (H): ICD-10-CM

## 2022-11-17 DIAGNOSIS — F31.31 BIPOLAR AFFECTIVE DISORDER, DEPRESSED, MILD (H): ICD-10-CM

## 2022-11-17 DIAGNOSIS — F60.3 BORDERLINE PERSONALITY DISORDER IN ADULT (H): ICD-10-CM

## 2022-11-17 DIAGNOSIS — F41.1 GENERALIZED ANXIETY DISORDER: Primary | ICD-10-CM

## 2022-12-01 ASSESSMENT — PATIENT HEALTH QUESTIONNAIRE - PHQ9
SUM OF ALL RESPONSES TO PHQ QUESTIONS 1-9: 15
10. IF YOU CHECKED OFF ANY PROBLEMS, HOW DIFFICULT HAVE THESE PROBLEMS MADE IT FOR YOU TO DO YOUR WORK, TAKE CARE OF THINGS AT HOME, OR GET ALONG WITH OTHER PEOPLE: EXTREMELY DIFFICULT
SUM OF ALL RESPONSES TO PHQ QUESTIONS 1-9: 15

## 2022-12-02 ENCOUNTER — VIRTUAL VISIT (OUTPATIENT)
Dept: PSYCHOLOGY | Facility: CLINIC | Age: 47
End: 2022-12-02
Payer: COMMERCIAL

## 2022-12-02 DIAGNOSIS — F84.0 AUTISM SPECTRUM DISORDER: ICD-10-CM

## 2022-12-02 DIAGNOSIS — F10.21 MODERATE ALCOHOL DEPENDENCE IN EARLY REMISSION (H): ICD-10-CM

## 2022-12-02 DIAGNOSIS — F41.1 GENERALIZED ANXIETY DISORDER: Primary | ICD-10-CM

## 2022-12-02 DIAGNOSIS — F60.3 BORDERLINE PERSONALITY DISORDER IN ADULT (H): ICD-10-CM

## 2022-12-02 DIAGNOSIS — F12.21 MODERATE TETRAHYDROCANNABINOL (THC) DEPENDENCE IN EARLY REMISSION (H): ICD-10-CM

## 2022-12-02 DIAGNOSIS — F31.31 BIPOLAR AFFECTIVE DISORDER, DEPRESSED, MILD (H): ICD-10-CM

## 2022-12-02 PROCEDURE — 90834 PSYTX W PT 45 MINUTES: CPT | Mod: 95
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
## 2022-12-16 ASSESSMENT — ANXIETY QUESTIONNAIRES
7. FEELING AFRAID AS IF SOMETHING AWFUL MIGHT HAPPEN: SEVERAL DAYS
7. FEELING AFRAID AS IF SOMETHING AWFUL MIGHT HAPPEN: SEVERAL DAYS
8. IF YOU CHECKED OFF ANY PROBLEMS, HOW DIFFICULT HAVE THESE MADE IT FOR YOU TO DO YOUR WORK, TAKE CARE OF THINGS AT HOME, OR GET ALONG WITH OTHER PEOPLE?: EXTREMELY DIFFICULT
GAD7 TOTAL SCORE: 13
IF YOU CHECKED OFF ANY PROBLEMS ON THIS QUESTIONNAIRE, HOW DIFFICULT HAVE THESE PROBLEMS MADE IT FOR YOU TO DO YOUR WORK, TAKE CARE OF THINGS AT HOME, OR GET ALONG WITH OTHER PEOPLE: EXTREMELY DIFFICULT
5. BEING SO RESTLESS THAT IT IS HARD TO SIT STILL: MORE THAN HALF THE DAYS
GAD7 TOTAL SCORE: 13
3. WORRYING TOO MUCH ABOUT DIFFERENT THINGS: MORE THAN HALF THE DAYS
4. TROUBLE RELAXING: MORE THAN HALF THE DAYS
6. BECOMING EASILY ANNOYED OR IRRITABLE: NEARLY EVERY DAY
1. FEELING NERVOUS, ANXIOUS, OR ON EDGE: MORE THAN HALF THE DAYS
2. NOT BEING ABLE TO STOP OR CONTROL WORRYING: SEVERAL DAYS
GAD7 TOTAL SCORE: 13

## 2022-12-23 ENCOUNTER — VIRTUAL VISIT (OUTPATIENT)
Dept: PSYCHOLOGY | Facility: CLINIC | Age: 47
End: 2022-12-23
Payer: COMMERCIAL

## 2022-12-23 DIAGNOSIS — F10.21 MODERATE ALCOHOL DEPENDENCE IN EARLY REMISSION (H): ICD-10-CM

## 2022-12-23 DIAGNOSIS — F60.3 BORDERLINE PERSONALITY DISORDER IN ADULT (H): ICD-10-CM

## 2022-12-23 DIAGNOSIS — F41.1 GENERALIZED ANXIETY DISORDER: Primary | ICD-10-CM

## 2022-12-23 DIAGNOSIS — F31.31 BIPOLAR AFFECTIVE DISORDER, DEPRESSED, MILD (H): ICD-10-CM

## 2022-12-23 DIAGNOSIS — F12.21 MODERATE TETRAHYDROCANNABINOL (THC) DEPENDENCE IN EARLY REMISSION (H): ICD-10-CM

## 2022-12-23 PROCEDURE — 90834 PSYTX W PT 45 MINUTES: CPT | Mod: 95
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
## 2023-01-06 ENCOUNTER — VIRTUAL VISIT (OUTPATIENT)
Dept: PSYCHOLOGY | Facility: CLINIC | Age: 48
End: 2023-01-06
Payer: COMMERCIAL

## 2023-01-06 DIAGNOSIS — F41.1 GENERALIZED ANXIETY DISORDER: Primary | ICD-10-CM

## 2023-01-06 DIAGNOSIS — F31.31 BIPOLAR AFFECTIVE DISORDER, DEPRESSED, MILD (H): ICD-10-CM

## 2023-01-06 DIAGNOSIS — F12.21 MODERATE TETRAHYDROCANNABINOL (THC) DEPENDENCE IN EARLY REMISSION (H): ICD-10-CM

## 2023-01-06 DIAGNOSIS — F10.21 MODERATE ALCOHOL DEPENDENCE IN EARLY REMISSION (H): ICD-10-CM

## 2023-01-06 DIAGNOSIS — F84.0 AUTISM SPECTRUM DISORDER: ICD-10-CM

## 2023-01-06 DIAGNOSIS — F60.3 BORDERLINE PERSONALITY DISORDER IN ADULT (H): ICD-10-CM

## 2023-01-06 PROCEDURE — 90834 PSYTX W PT 45 MINUTES: CPT | Mod: 95

## 2023-01-06 ASSESSMENT — ANXIETY QUESTIONNAIRES
4. TROUBLE RELAXING: MORE THAN HALF THE DAYS
5. BEING SO RESTLESS THAT IT IS HARD TO SIT STILL: MORE THAN HALF THE DAYS
3. WORRYING TOO MUCH ABOUT DIFFERENT THINGS: MORE THAN HALF THE DAYS
7. FEELING AFRAID AS IF SOMETHING AWFUL MIGHT HAPPEN: SEVERAL DAYS
8. IF YOU CHECKED OFF ANY PROBLEMS, HOW DIFFICULT HAVE THESE MADE IT FOR YOU TO DO YOUR WORK, TAKE CARE OF THINGS AT HOME, OR GET ALONG WITH OTHER PEOPLE?: EXTREMELY DIFFICULT
2. NOT BEING ABLE TO STOP OR CONTROL WORRYING: NEARLY EVERY DAY
7. FEELING AFRAID AS IF SOMETHING AWFUL MIGHT HAPPEN: SEVERAL DAYS
6. BECOMING EASILY ANNOYED OR IRRITABLE: NEARLY EVERY DAY
IF YOU CHECKED OFF ANY PROBLEMS ON THIS QUESTIONNAIRE, HOW DIFFICULT HAVE THESE PROBLEMS MADE IT FOR YOU TO DO YOUR WORK, TAKE CARE OF THINGS AT HOME, OR GET ALONG WITH OTHER PEOPLE: EXTREMELY DIFFICULT
1. FEELING NERVOUS, ANXIOUS, OR ON EDGE: NEARLY EVERY DAY
GAD7 TOTAL SCORE: 16
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
## 2023-01-20 ENCOUNTER — VIRTUAL VISIT (OUTPATIENT)
Dept: PSYCHOLOGY | Facility: CLINIC | Age: 48
End: 2023-01-20
Payer: COMMERCIAL

## 2023-01-20 DIAGNOSIS — F12.21 MODERATE TETRAHYDROCANNABINOL (THC) DEPENDENCE IN EARLY REMISSION (H): ICD-10-CM

## 2023-01-20 DIAGNOSIS — F31.31 BIPOLAR AFFECTIVE DISORDER, DEPRESSED, MILD (H): Primary | ICD-10-CM

## 2023-01-20 DIAGNOSIS — F60.3 BORDERLINE PERSONALITY DISORDER IN ADULT (H): ICD-10-CM

## 2023-01-20 DIAGNOSIS — F10.21 MODERATE ALCOHOL DEPENDENCE IN EARLY REMISSION (H): ICD-10-CM

## 2023-01-20 PROCEDURE — 90834 PSYTX W PT 45 MINUTES: CPT | Mod: 95
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
## 2023-02-03 ENCOUNTER — VIRTUAL VISIT (OUTPATIENT)
Dept: PSYCHOLOGY | Facility: CLINIC | Age: 48
End: 2023-02-03
Payer: COMMERCIAL

## 2023-02-03 DIAGNOSIS — F60.3 BORDERLINE PERSONALITY DISORDER IN ADULT (H): ICD-10-CM

## 2023-02-03 DIAGNOSIS — F84.0 AUTISM SPECTRUM DISORDER: ICD-10-CM

## 2023-02-03 DIAGNOSIS — F10.21 MODERATE ALCOHOL DEPENDENCE IN EARLY REMISSION (H): ICD-10-CM

## 2023-02-03 DIAGNOSIS — F31.31 BIPOLAR AFFECTIVE DISORDER, DEPRESSED, MILD (H): Primary | ICD-10-CM

## 2023-02-03 DIAGNOSIS — F12.21 MODERATE TETRAHYDROCANNABINOL (THC) DEPENDENCE IN EARLY REMISSION (H): ICD-10-CM

## 2023-02-03 PROCEDURE — 90834 PSYTX W PT 45 MINUTES: CPT | Mod: 95

## 2023-02-17 ENCOUNTER — VIRTUAL VISIT (OUTPATIENT)
Dept: PSYCHOLOGY | Facility: CLINIC | Age: 48
End: 2023-02-17
Payer: COMMERCIAL

## 2023-02-17 DIAGNOSIS — F31.31 BIPOLAR AFFECTIVE DISORDER, DEPRESSED, MILD (H): ICD-10-CM

## 2023-02-17 DIAGNOSIS — F41.1 GENERALIZED ANXIETY DISORDER: Primary | ICD-10-CM

## 2023-02-17 DIAGNOSIS — F60.3 BORDERLINE PERSONALITY DISORDER IN ADULT (H): ICD-10-CM

## 2023-02-17 PROCEDURE — 90834 PSYTX W PT 45 MINUTES: CPT | Mod: VID

## 2023-02-17 ASSESSMENT — PATIENT HEALTH QUESTIONNAIRE - PHQ9
10. IF YOU CHECKED OFF ANY PROBLEMS, HOW DIFFICULT HAVE THESE PROBLEMS MADE IT FOR YOU TO DO YOUR WORK, TAKE CARE OF THINGS AT HOME, OR GET ALONG WITH OTHER PEOPLE: EXTREMELY DIFFICULT
SUM OF ALL RESPONSES TO PHQ QUESTIONS 1-9: 17
SUM OF ALL RESPONSES TO PHQ QUESTIONS 1-9: 17
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
## 2023-03-17 ENCOUNTER — VIRTUAL VISIT (OUTPATIENT)
Dept: PSYCHOLOGY | Facility: CLINIC | Age: 48
End: 2023-03-17
Payer: COMMERCIAL

## 2023-03-17 DIAGNOSIS — F60.3 BORDERLINE PERSONALITY DISORDER IN ADULT (H): ICD-10-CM

## 2023-03-17 DIAGNOSIS — F84.0 AUTISM SPECTRUM DISORDER: ICD-10-CM

## 2023-03-17 DIAGNOSIS — F41.1 GENERALIZED ANXIETY DISORDER: Primary | ICD-10-CM

## 2023-03-17 DIAGNOSIS — F10.21 MODERATE ALCOHOL DEPENDENCE IN EARLY REMISSION (H): ICD-10-CM

## 2023-03-17 DIAGNOSIS — F31.31 BIPOLAR AFFECTIVE DISORDER, DEPRESSED, MILD (H): ICD-10-CM

## 2023-03-17 PROCEDURE — 90834 PSYTX W PT 45 MINUTES: CPT | Mod: VID
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
## 2023-03-31 ENCOUNTER — VIRTUAL VISIT (OUTPATIENT)
Dept: PSYCHOLOGY | Facility: CLINIC | Age: 48
End: 2023-03-31
Payer: COMMERCIAL

## 2023-03-31 DIAGNOSIS — F84.0 AUTISM SPECTRUM DISORDER: ICD-10-CM

## 2023-03-31 DIAGNOSIS — F31.31 BIPOLAR AFFECTIVE DISORDER, DEPRESSED, MILD (H): ICD-10-CM

## 2023-03-31 DIAGNOSIS — F10.21 MODERATE ALCOHOL DEPENDENCE IN EARLY REMISSION (H): ICD-10-CM

## 2023-03-31 DIAGNOSIS — F60.3 BORDERLINE PERSONALITY DISORDER IN ADULT (H): ICD-10-CM

## 2023-03-31 DIAGNOSIS — F41.1 GENERALIZED ANXIETY DISORDER: Primary | ICD-10-CM

## 2023-03-31 PROCEDURE — 90834 PSYTX W PT 45 MINUTES: CPT | Mod: VID

## 2023-03-31 ASSESSMENT — PATIENT HEALTH QUESTIONNAIRE - PHQ9
10. IF YOU CHECKED OFF ANY PROBLEMS, HOW DIFFICULT HAVE THESE PROBLEMS MADE IT FOR YOU TO DO YOUR WORK, TAKE CARE OF THINGS AT HOME, OR GET ALONG WITH OTHER PEOPLE: EXTREMELY DIFFICULT
SUM OF ALL RESPONSES TO PHQ QUESTIONS 1-9: 16
SUM OF ALL RESPONSES TO PHQ QUESTIONS 1-9: 16
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
## 2023-04-14 ENCOUNTER — VIRTUAL VISIT (OUTPATIENT)
Dept: PSYCHOLOGY | Facility: CLINIC | Age: 48
End: 2023-04-14
Payer: COMMERCIAL

## 2023-04-14 DIAGNOSIS — F60.3 BORDERLINE PERSONALITY DISORDER IN ADULT (H): ICD-10-CM

## 2023-04-14 DIAGNOSIS — F31.31 BIPOLAR AFFECTIVE DISORDER, DEPRESSED, MILD (H): ICD-10-CM

## 2023-04-14 DIAGNOSIS — F41.1 GENERALIZED ANXIETY DISORDER: Primary | ICD-10-CM

## 2023-04-14 PROCEDURE — 90834 PSYTX W PT 45 MINUTES: CPT | Mod: VID

## 2023-04-14 ASSESSMENT — ANXIETY QUESTIONNAIRES
2. NOT BEING ABLE TO STOP OR CONTROL WORRYING: NEARLY EVERY DAY
7. FEELING AFRAID AS IF SOMETHING AWFUL MIGHT HAPPEN: NEARLY EVERY DAY
5. BEING SO RESTLESS THAT IT IS HARD TO SIT STILL: NEARLY EVERY DAY
7. FEELING AFRAID AS IF SOMETHING AWFUL MIGHT HAPPEN: NEARLY EVERY DAY
3. WORRYING TOO MUCH ABOUT DIFFERENT THINGS: NEARLY EVERY DAY
6. BECOMING EASILY ANNOYED OR IRRITABLE: MORE THAN HALF THE DAYS
GAD7 TOTAL SCORE: 20
4. TROUBLE RELAXING: NEARLY EVERY DAY
GAD7 TOTAL SCORE: 20
IF YOU CHECKED OFF ANY PROBLEMS ON THIS QUESTIONNAIRE, HOW DIFFICULT HAVE THESE PROBLEMS MADE IT FOR YOU TO DO YOUR WORK, TAKE CARE OF THINGS AT HOME, OR GET ALONG WITH OTHER PEOPLE: EXTREMELY DIFFICULT
GAD7 TOTAL SCORE: 20
1. FEELING NERVOUS, ANXIOUS, OR ON EDGE: NEARLY EVERY DAY
8. IF YOU CHECKED OFF ANY PROBLEMS, HOW DIFFICULT HAVE THESE MADE IT FOR YOU TO DO YOUR WORK, TAKE CARE OF THINGS AT HOME, OR GET ALONG WITH OTHER PEOPLE?: EXTREMELY DIFFICULT
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
## 2023-04-28 ENCOUNTER — VIRTUAL VISIT (OUTPATIENT)
Dept: PSYCHOLOGY | Facility: CLINIC | Age: 48
End: 2023-04-28
Payer: COMMERCIAL

## 2023-04-28 DIAGNOSIS — F60.3 BORDERLINE PERSONALITY DISORDER IN ADULT (H): ICD-10-CM

## 2023-04-28 DIAGNOSIS — F84.0 AUTISM SPECTRUM DISORDER: ICD-10-CM

## 2023-04-28 DIAGNOSIS — F31.31 BIPOLAR AFFECTIVE DISORDER, DEPRESSED, MILD (H): ICD-10-CM

## 2023-04-28 DIAGNOSIS — F41.1 GENERALIZED ANXIETY DISORDER: Primary | ICD-10-CM

## 2023-04-28 DIAGNOSIS — F10.21 MODERATE ALCOHOL DEPENDENCE IN EARLY REMISSION (H): ICD-10-CM

## 2023-04-28 PROCEDURE — 90834 PSYTX W PT 45 MINUTES: CPT | Mod: VID

## 2023-04-28 ASSESSMENT — ANXIETY QUESTIONNAIRES
3. WORRYING TOO MUCH ABOUT DIFFERENT THINGS: NEARLY EVERY DAY
7. FEELING AFRAID AS IF SOMETHING AWFUL MIGHT HAPPEN: SEVERAL DAYS
GAD7 TOTAL SCORE: 15
5. BEING SO RESTLESS THAT IT IS HARD TO SIT STILL: MORE THAN HALF THE DAYS
IF YOU CHECKED OFF ANY PROBLEMS ON THIS QUESTIONNAIRE, HOW DIFFICULT HAVE THESE PROBLEMS MADE IT FOR YOU TO DO YOUR WORK, TAKE CARE OF THINGS AT HOME, OR GET ALONG WITH OTHER PEOPLE: EXTREMELY DIFFICULT
2. NOT BEING ABLE TO STOP OR CONTROL WORRYING: NEARLY EVERY DAY
6. BECOMING EASILY ANNOYED OR IRRITABLE: NOT AT ALL
GAD7 TOTAL SCORE: 15
4. TROUBLE RELAXING: NEARLY EVERY DAY
7. FEELING AFRAID AS IF SOMETHING AWFUL MIGHT HAPPEN: SEVERAL DAYS
1. FEELING NERVOUS, ANXIOUS, OR ON EDGE: NEARLY EVERY DAY
8. IF YOU CHECKED OFF ANY PROBLEMS, HOW DIFFICULT HAVE THESE MADE IT FOR YOU TO DO YOUR WORK, TAKE CARE OF THINGS AT HOME, OR GET ALONG WITH OTHER PEOPLE?: EXTREMELY DIFFICULT
GAD7 TOTAL SCORE: 15

## 2023-04-28 ASSESSMENT — PATIENT HEALTH QUESTIONNAIRE - PHQ9
10. IF YOU CHECKED OFF ANY PROBLEMS, HOW DIFFICULT HAVE THESE PROBLEMS MADE IT FOR YOU TO DO YOUR WORK, TAKE CARE OF THINGS AT HOME, OR GET ALONG WITH OTHER PEOPLE: EXTREMELY DIFFICULT
SUM OF ALL RESPONSES TO PHQ QUESTIONS 1-9: 18
SUM OF ALL RESPONSES TO PHQ QUESTIONS 1-9: 18
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
## 2023-05-12 ENCOUNTER — VIRTUAL VISIT (OUTPATIENT)
Dept: PSYCHOLOGY | Facility: CLINIC | Age: 48
End: 2023-05-12
Payer: COMMERCIAL

## 2023-05-12 DIAGNOSIS — F10.21 MODERATE ALCOHOL DEPENDENCE IN EARLY REMISSION (H): ICD-10-CM

## 2023-05-12 DIAGNOSIS — F41.1 GENERALIZED ANXIETY DISORDER: Primary | ICD-10-CM

## 2023-05-12 DIAGNOSIS — F60.3 BORDERLINE PERSONALITY DISORDER IN ADULT (H): ICD-10-CM

## 2023-05-12 DIAGNOSIS — F31.31 BIPOLAR AFFECTIVE DISORDER, DEPRESSED, MILD (H): ICD-10-CM

## 2023-05-12 PROCEDURE — 90834 PSYTX W PT 45 MINUTES: CPT | Mod: VID
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
## 2023-05-18 ENCOUNTER — VIRTUAL VISIT (OUTPATIENT)
Dept: PSYCHOLOGY | Facility: CLINIC | Age: 48
End: 2023-05-18
Payer: COMMERCIAL

## 2023-05-18 DIAGNOSIS — F60.3 BORDERLINE PERSONALITY DISORDER IN ADULT (H): ICD-10-CM

## 2023-05-18 DIAGNOSIS — F10.21 MODERATE ALCOHOL DEPENDENCE IN EARLY REMISSION (H): ICD-10-CM

## 2023-05-18 DIAGNOSIS — F31.31 BIPOLAR AFFECTIVE DISORDER, DEPRESSED, MILD (H): ICD-10-CM

## 2023-05-18 DIAGNOSIS — F41.1 GENERALIZED ANXIETY DISORDER: Primary | ICD-10-CM

## 2023-05-18 PROCEDURE — 90834 PSYTX W PT 45 MINUTES: CPT | Mod: VID
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
## 2023-05-25 ENCOUNTER — VIRTUAL VISIT (OUTPATIENT)
Dept: PSYCHOLOGY | Facility: CLINIC | Age: 48
End: 2023-05-25
Payer: COMMERCIAL

## 2023-05-25 DIAGNOSIS — F41.1 GENERALIZED ANXIETY DISORDER: Primary | ICD-10-CM

## 2023-05-25 DIAGNOSIS — F31.31 BIPOLAR AFFECTIVE DISORDER, DEPRESSED, MILD (H): ICD-10-CM

## 2023-05-25 DIAGNOSIS — F10.21 MODERATE ALCOHOL DEPENDENCE IN EARLY REMISSION (H): ICD-10-CM

## 2023-05-25 DIAGNOSIS — F60.3 BORDERLINE PERSONALITY DISORDER IN ADULT (H): ICD-10-CM

## 2023-05-25 PROCEDURE — 90834 PSYTX W PT 45 MINUTES: CPT | Mod: VID
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
## 2023-06-01 ENCOUNTER — VIRTUAL VISIT (OUTPATIENT)
Dept: PSYCHOLOGY | Facility: CLINIC | Age: 48
End: 2023-06-01
Payer: COMMERCIAL

## 2023-06-01 DIAGNOSIS — F31.31 BIPOLAR AFFECTIVE DISORDER, DEPRESSED, MILD (H): ICD-10-CM

## 2023-06-01 DIAGNOSIS — F60.3 BORDERLINE PERSONALITY DISORDER IN ADULT (H): ICD-10-CM

## 2023-06-01 DIAGNOSIS — F41.1 GENERALIZED ANXIETY DISORDER: Primary | ICD-10-CM

## 2023-06-01 DIAGNOSIS — F10.21 MODERATE ALCOHOL DEPENDENCE IN EARLY REMISSION (H): ICD-10-CM

## 2023-06-01 PROCEDURE — 90834 PSYTX W PT 45 MINUTES: CPT | Mod: VID

## 2023-06-01 ASSESSMENT — PATIENT HEALTH QUESTIONNAIRE - PHQ9
10. IF YOU CHECKED OFF ANY PROBLEMS, HOW DIFFICULT HAVE THESE PROBLEMS MADE IT FOR YOU TO DO YOUR WORK, TAKE CARE OF THINGS AT HOME, OR GET ALONG WITH OTHER PEOPLE: EXTREMELY DIFFICULT
SUM OF ALL RESPONSES TO PHQ QUESTIONS 1-9: 24
SUM OF ALL RESPONSES TO PHQ QUESTIONS 1-9: 24
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
## 2023-06-08 ENCOUNTER — VIRTUAL VISIT (OUTPATIENT)
Dept: PSYCHOLOGY | Facility: CLINIC | Age: 48
End: 2023-06-08
Payer: COMMERCIAL

## 2023-06-08 DIAGNOSIS — F60.3 BORDERLINE PERSONALITY DISORDER IN ADULT (H): ICD-10-CM

## 2023-06-08 DIAGNOSIS — F41.1 GENERALIZED ANXIETY DISORDER: Primary | ICD-10-CM

## 2023-06-08 DIAGNOSIS — F31.31 BIPOLAR AFFECTIVE DISORDER, DEPRESSED, MILD (H): ICD-10-CM

## 2023-06-08 PROCEDURE — 90834 PSYTX W PT 45 MINUTES: CPT | Mod: VID

## 2023-06-08 ASSESSMENT — PATIENT HEALTH QUESTIONNAIRE - PHQ9
SUM OF ALL RESPONSES TO PHQ QUESTIONS 1-9: 14
10. IF YOU CHECKED OFF ANY PROBLEMS, HOW DIFFICULT HAVE THESE PROBLEMS MADE IT FOR YOU TO DO YOUR WORK, TAKE CARE OF THINGS AT HOME, OR GET ALONG WITH OTHER PEOPLE: EXTREMELY DIFFICULT
SUM OF ALL RESPONSES TO PHQ QUESTIONS 1-9: 14
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
## 2023-06-15 ENCOUNTER — VIRTUAL VISIT (OUTPATIENT)
Dept: PSYCHOLOGY | Facility: CLINIC | Age: 48
End: 2023-06-15
Payer: COMMERCIAL

## 2023-06-15 DIAGNOSIS — F41.1 GENERALIZED ANXIETY DISORDER: Primary | ICD-10-CM

## 2023-06-15 DIAGNOSIS — F10.21 MODERATE ALCOHOL DEPENDENCE IN EARLY REMISSION (H): ICD-10-CM

## 2023-06-15 DIAGNOSIS — F31.31 BIPOLAR AFFECTIVE DISORDER, DEPRESSED, MILD (H): ICD-10-CM

## 2023-06-15 DIAGNOSIS — F60.3 BORDERLINE PERSONALITY DISORDER IN ADULT (H): ICD-10-CM

## 2023-06-15 PROCEDURE — 90834 PSYTX W PT 45 MINUTES: CPT | Mod: VID

## 2023-06-15 ASSESSMENT — PATIENT HEALTH QUESTIONNAIRE - PHQ9
10. IF YOU CHECKED OFF ANY PROBLEMS, HOW DIFFICULT HAVE THESE PROBLEMS MADE IT FOR YOU TO DO YOUR WORK, TAKE CARE OF THINGS AT HOME, OR GET ALONG WITH OTHER PEOPLE: EXTREMELY DIFFICULT
SUM OF ALL RESPONSES TO PHQ QUESTIONS 1-9: 20
SUM OF ALL RESPONSES TO PHQ QUESTIONS 1-9: 20
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
## 2023-06-22 ENCOUNTER — VIRTUAL VISIT (OUTPATIENT)
Dept: PSYCHOLOGY | Facility: CLINIC | Age: 48
End: 2023-06-22
Payer: COMMERCIAL

## 2023-06-22 DIAGNOSIS — F41.1 GENERALIZED ANXIETY DISORDER: Primary | ICD-10-CM

## 2023-06-22 DIAGNOSIS — F60.3 BORDERLINE PERSONALITY DISORDER IN ADULT (H): ICD-10-CM

## 2023-06-22 DIAGNOSIS — F31.31 BIPOLAR AFFECTIVE DISORDER, DEPRESSED, MILD (H): ICD-10-CM

## 2023-06-22 DIAGNOSIS — F10.21 MODERATE ALCOHOL DEPENDENCE IN EARLY REMISSION (H): ICD-10-CM

## 2023-06-22 PROCEDURE — 90834 PSYTX W PT 45 MINUTES: CPT | Mod: VID
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
## 2023-06-29 ENCOUNTER — VIRTUAL VISIT (OUTPATIENT)
Dept: PSYCHOLOGY | Facility: CLINIC | Age: 48
End: 2023-06-29
Payer: COMMERCIAL

## 2023-06-29 DIAGNOSIS — F41.1 GENERALIZED ANXIETY DISORDER: Primary | ICD-10-CM

## 2023-06-29 DIAGNOSIS — F60.3 BORDERLINE PERSONALITY DISORDER IN ADULT (H): ICD-10-CM

## 2023-06-29 DIAGNOSIS — F10.21 MODERATE ALCOHOL DEPENDENCE IN EARLY REMISSION (H): ICD-10-CM

## 2023-06-29 DIAGNOSIS — F31.31 BIPOLAR AFFECTIVE DISORDER, DEPRESSED, MILD (H): ICD-10-CM

## 2023-06-29 PROCEDURE — 90834 PSYTX W PT 45 MINUTES: CPT | Mod: VID
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
## 2023-07-06 ENCOUNTER — VIRTUAL VISIT (OUTPATIENT)
Dept: PSYCHOLOGY | Facility: CLINIC | Age: 48
End: 2023-07-06
Payer: COMMERCIAL

## 2023-07-06 DIAGNOSIS — F10.21 MODERATE ALCOHOL DEPENDENCE IN EARLY REMISSION (H): ICD-10-CM

## 2023-07-06 DIAGNOSIS — F41.1 GENERALIZED ANXIETY DISORDER: Primary | ICD-10-CM

## 2023-07-06 DIAGNOSIS — F31.31 BIPOLAR AFFECTIVE DISORDER, DEPRESSED, MILD (H): ICD-10-CM

## 2023-07-06 DIAGNOSIS — F60.3 BORDERLINE PERSONALITY DISORDER IN ADULT (H): ICD-10-CM

## 2023-07-06 PROCEDURE — 90834 PSYTX W PT 45 MINUTES: CPT | Mod: VID
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
## 2023-07-21 ENCOUNTER — VIRTUAL VISIT (OUTPATIENT)
Dept: PSYCHOLOGY | Facility: CLINIC | Age: 48
End: 2023-07-21
Payer: COMMERCIAL

## 2023-07-21 DIAGNOSIS — F31.31 BIPOLAR AFFECTIVE DISORDER, DEPRESSED, MILD (H): ICD-10-CM

## 2023-07-21 DIAGNOSIS — F60.3 BORDERLINE PERSONALITY DISORDER IN ADULT (H): ICD-10-CM

## 2023-07-21 DIAGNOSIS — F41.1 GENERALIZED ANXIETY DISORDER: Primary | ICD-10-CM

## 2023-07-21 PROCEDURE — 90834 PSYTX W PT 45 MINUTES: CPT | Mod: VID

## 2023-07-21 ASSESSMENT — PATIENT HEALTH QUESTIONNAIRE - PHQ9
SUM OF ALL RESPONSES TO PHQ QUESTIONS 1-9: 14
SUM OF ALL RESPONSES TO PHQ QUESTIONS 1-9: 14
10. IF YOU CHECKED OFF ANY PROBLEMS, HOW DIFFICULT HAVE THESE PROBLEMS MADE IT FOR YOU TO DO YOUR WORK, TAKE CARE OF THINGS AT HOME, OR GET ALONG WITH OTHER PEOPLE: EXTREMELY DIFFICULT
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
## 2023-08-14 ENCOUNTER — VIRTUAL VISIT (OUTPATIENT)
Dept: PSYCHOLOGY | Facility: CLINIC | Age: 48
End: 2023-08-14
Payer: COMMERCIAL

## 2023-08-14 DIAGNOSIS — F31.31 BIPOLAR AFFECTIVE DISORDER, DEPRESSED, MILD (H): ICD-10-CM

## 2023-08-14 DIAGNOSIS — F84.0 AUTISM SPECTRUM DISORDER: ICD-10-CM

## 2023-08-14 DIAGNOSIS — F41.1 GENERALIZED ANXIETY DISORDER: Primary | ICD-10-CM

## 2023-08-14 DIAGNOSIS — F60.3 BORDERLINE PERSONALITY DISORDER IN ADULT (H): ICD-10-CM

## 2023-08-14 PROCEDURE — 90834 PSYTX W PT 45 MINUTES: CPT | Mod: VID
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
## 2023-08-28 ENCOUNTER — VIRTUAL VISIT (OUTPATIENT)
Dept: PSYCHOLOGY | Facility: CLINIC | Age: 48
End: 2023-08-28
Payer: COMMERCIAL

## 2023-08-28 DIAGNOSIS — F10.21 MODERATE ALCOHOL DEPENDENCE IN EARLY REMISSION (H): ICD-10-CM

## 2023-08-28 DIAGNOSIS — F84.0 AUTISM SPECTRUM DISORDER: ICD-10-CM

## 2023-08-28 DIAGNOSIS — F41.1 GENERALIZED ANXIETY DISORDER: Primary | ICD-10-CM

## 2023-08-28 DIAGNOSIS — F31.31 BIPOLAR AFFECTIVE DISORDER, DEPRESSED, MILD (H): ICD-10-CM

## 2023-08-28 DIAGNOSIS — F60.3 BORDERLINE PERSONALITY DISORDER IN ADULT (H): ICD-10-CM

## 2023-08-28 PROCEDURE — 90834 PSYTX W PT 45 MINUTES: CPT | Mod: VID

## 2023-08-28 ASSESSMENT — ANXIETY QUESTIONNAIRES
2. NOT BEING ABLE TO STOP OR CONTROL WORRYING: NEARLY EVERY DAY
3. WORRYING TOO MUCH ABOUT DIFFERENT THINGS: NEARLY EVERY DAY
GAD7 TOTAL SCORE: 15
1. FEELING NERVOUS, ANXIOUS, OR ON EDGE: NEARLY EVERY DAY
7. FEELING AFRAID AS IF SOMETHING AWFUL MIGHT HAPPEN: NOT AT ALL
IF YOU CHECKED OFF ANY PROBLEMS ON THIS QUESTIONNAIRE, HOW DIFFICULT HAVE THESE PROBLEMS MADE IT FOR YOU TO DO YOUR WORK, TAKE CARE OF THINGS AT HOME, OR GET ALONG WITH OTHER PEOPLE: EXTREMELY DIFFICULT
6. BECOMING EASILY ANNOYED OR IRRITABLE: NOT AT ALL
4. TROUBLE RELAXING: NEARLY EVERY DAY
5. BEING SO RESTLESS THAT IT IS HARD TO SIT STILL: NEARLY EVERY DAY
GAD7 TOTAL SCORE: 15

## 2023-08-28 ASSESSMENT — PATIENT HEALTH QUESTIONNAIRE - PHQ9
10. IF YOU CHECKED OFF ANY PROBLEMS, HOW DIFFICULT HAVE THESE PROBLEMS MADE IT FOR YOU TO DO YOUR WORK, TAKE CARE OF THINGS AT HOME, OR GET ALONG WITH OTHER PEOPLE: EXTREMELY DIFFICULT
SUM OF ALL RESPONSES TO PHQ QUESTIONS 1-9: 14
SUM OF ALL RESPONSES TO PHQ QUESTIONS 1-9: 14
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
## 2023-09-12 ENCOUNTER — VIRTUAL VISIT (OUTPATIENT)
Dept: PSYCHOLOGY | Facility: CLINIC | Age: 48
End: 2023-09-12
Payer: COMMERCIAL

## 2023-09-12 DIAGNOSIS — F41.1 GENERALIZED ANXIETY DISORDER: Primary | ICD-10-CM

## 2023-09-12 DIAGNOSIS — F84.0 AUTISM SPECTRUM DISORDER: ICD-10-CM

## 2023-09-12 DIAGNOSIS — F60.3 BORDERLINE PERSONALITY DISORDER IN ADULT (H): ICD-10-CM

## 2023-09-12 DIAGNOSIS — F10.21 MODERATE ALCOHOL DEPENDENCE IN EARLY REMISSION (H): ICD-10-CM

## 2023-09-12 PROCEDURE — 90834 PSYTX W PT 45 MINUTES: CPT | Mod: VID
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
## 2023-09-26 ENCOUNTER — VIRTUAL VISIT (OUTPATIENT)
Dept: PSYCHOLOGY | Facility: CLINIC | Age: 48
End: 2023-09-26
Payer: COMMERCIAL

## 2023-09-26 DIAGNOSIS — F98.8 ADD (ATTENTION DEFICIT DISORDER): ICD-10-CM

## 2023-09-26 DIAGNOSIS — F43.10 PTSD (POST-TRAUMATIC STRESS DISORDER): ICD-10-CM

## 2023-09-26 DIAGNOSIS — F60.3 BORDERLINE PERSONALITY DISORDER IN ADULT (H): ICD-10-CM

## 2023-09-26 DIAGNOSIS — F41.1 GENERALIZED ANXIETY DISORDER: Primary | ICD-10-CM

## 2023-09-26 PROCEDURE — 90834 PSYTX W PT 45 MINUTES: CPT | Mod: VID
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
## 2023-10-03 ENCOUNTER — VIRTUAL VISIT (OUTPATIENT)
Dept: PSYCHOLOGY | Facility: CLINIC | Age: 48
End: 2023-10-03
Payer: COMMERCIAL

## 2023-10-03 DIAGNOSIS — F43.10 PTSD (POST-TRAUMATIC STRESS DISORDER): ICD-10-CM

## 2023-10-03 DIAGNOSIS — F60.3 BORDERLINE PERSONALITY DISORDER IN ADULT (H): ICD-10-CM

## 2023-10-03 DIAGNOSIS — F41.1 GENERALIZED ANXIETY DISORDER: Primary | ICD-10-CM

## 2023-10-03 PROCEDURE — 90834 PSYTX W PT 45 MINUTES: CPT | Mod: VID

## 2023-10-03 ASSESSMENT — PATIENT HEALTH QUESTIONNAIRE - PHQ9
10. IF YOU CHECKED OFF ANY PROBLEMS, HOW DIFFICULT HAVE THESE PROBLEMS MADE IT FOR YOU TO DO YOUR WORK, TAKE CARE OF THINGS AT HOME, OR GET ALONG WITH OTHER PEOPLE: SOMEWHAT DIFFICULT
SUM OF ALL RESPONSES TO PHQ QUESTIONS 1-9: 8
SUM OF ALL RESPONSES TO PHQ QUESTIONS 1-9: 8
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
## 2023-10-24 ENCOUNTER — VIRTUAL VISIT (OUTPATIENT)
Dept: PSYCHOLOGY | Facility: CLINIC | Age: 48
End: 2023-10-24
Payer: COMMERCIAL

## 2023-10-24 DIAGNOSIS — F41.1 GENERALIZED ANXIETY DISORDER: Primary | ICD-10-CM

## 2023-10-24 DIAGNOSIS — F43.10 PTSD (POST-TRAUMATIC STRESS DISORDER): ICD-10-CM

## 2023-10-24 DIAGNOSIS — F60.3 BORDERLINE PERSONALITY DISORDER IN ADULT (H): ICD-10-CM

## 2023-10-24 PROCEDURE — 90834 PSYTX W PT 45 MINUTES: CPT | Mod: VID
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
## 2023-11-07 ENCOUNTER — VIRTUAL VISIT (OUTPATIENT)
Dept: PSYCHOLOGY | Facility: CLINIC | Age: 48
End: 2023-11-07
Payer: COMMERCIAL

## 2023-11-07 DIAGNOSIS — F60.3 BORDERLINE PERSONALITY DISORDER IN ADULT (H): ICD-10-CM

## 2023-11-07 DIAGNOSIS — F84.0 AUTISM SPECTRUM DISORDER: ICD-10-CM

## 2023-11-07 DIAGNOSIS — F43.10 PTSD (POST-TRAUMATIC STRESS DISORDER): ICD-10-CM

## 2023-11-07 DIAGNOSIS — F41.1 GENERALIZED ANXIETY DISORDER: Primary | ICD-10-CM

## 2023-11-07 PROCEDURE — 90834 PSYTX W PT 45 MINUTES: CPT | Mod: VID

## 2023-11-07 ASSESSMENT — PATIENT HEALTH QUESTIONNAIRE - PHQ9
SUM OF ALL RESPONSES TO PHQ QUESTIONS 1-9: 9
SUM OF ALL RESPONSES TO PHQ QUESTIONS 1-9: 9
10. IF YOU CHECKED OFF ANY PROBLEMS, HOW DIFFICULT HAVE THESE PROBLEMS MADE IT FOR YOU TO DO YOUR WORK, TAKE CARE OF THINGS AT HOME, OR GET ALONG WITH OTHER PEOPLE: VERY DIFFICULT
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
## 2023-11-14 ENCOUNTER — VIRTUAL VISIT (OUTPATIENT)
Dept: PSYCHOLOGY | Facility: CLINIC | Age: 48
End: 2023-11-14
Payer: COMMERCIAL

## 2023-11-14 DIAGNOSIS — F41.1 GENERALIZED ANXIETY DISORDER: ICD-10-CM

## 2023-11-14 DIAGNOSIS — F60.3 BORDERLINE PERSONALITY DISORDER IN ADULT (H): Primary | ICD-10-CM

## 2023-11-14 DIAGNOSIS — F33.0 MILD EPISODE OF RECURRENT MAJOR DEPRESSIVE DISORDER (H): ICD-10-CM

## 2023-11-14 DIAGNOSIS — F43.10 PTSD (POST-TRAUMATIC STRESS DISORDER): ICD-10-CM

## 2023-11-14 PROCEDURE — 90834 PSYTX W PT 45 MINUTES: CPT | Mod: VID

## 2023-11-14 ASSESSMENT — PATIENT HEALTH QUESTIONNAIRE - PHQ9
10. IF YOU CHECKED OFF ANY PROBLEMS, HOW DIFFICULT HAVE THESE PROBLEMS MADE IT FOR YOU TO DO YOUR WORK, TAKE CARE OF THINGS AT HOME, OR GET ALONG WITH OTHER PEOPLE: VERY DIFFICULT
SUM OF ALL RESPONSES TO PHQ QUESTIONS 1-9: 13
SUM OF ALL RESPONSES TO PHQ QUESTIONS 1-9: 13
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
## 2023-11-21 ENCOUNTER — VIRTUAL VISIT (OUTPATIENT)
Dept: PSYCHOLOGY | Facility: CLINIC | Age: 48
End: 2023-11-21
Payer: COMMERCIAL

## 2023-11-21 DIAGNOSIS — F41.1 GENERALIZED ANXIETY DISORDER: ICD-10-CM

## 2023-11-21 DIAGNOSIS — F60.3 BORDERLINE PERSONALITY DISORDER IN ADULT (H): Primary | ICD-10-CM

## 2023-11-21 DIAGNOSIS — F43.10 PTSD (POST-TRAUMATIC STRESS DISORDER): ICD-10-CM

## 2023-11-21 DIAGNOSIS — F10.21 MODERATE ALCOHOL DEPENDENCE IN EARLY REMISSION (H): ICD-10-CM

## 2023-11-21 DIAGNOSIS — F33.0 MILD EPISODE OF RECURRENT MAJOR DEPRESSIVE DISORDER (H): ICD-10-CM

## 2023-11-21 PROCEDURE — 90834 PSYTX W PT 45 MINUTES: CPT | Mod: VID
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
## 2023-11-28 ENCOUNTER — VIRTUAL VISIT (OUTPATIENT)
Dept: PSYCHOLOGY | Facility: CLINIC | Age: 48
End: 2023-11-28
Payer: COMMERCIAL

## 2023-11-28 DIAGNOSIS — F41.1 GENERALIZED ANXIETY DISORDER: Primary | ICD-10-CM

## 2023-11-28 DIAGNOSIS — F60.3 BORDERLINE PERSONALITY DISORDER IN ADULT (H): ICD-10-CM

## 2023-11-28 DIAGNOSIS — F43.10 PTSD (POST-TRAUMATIC STRESS DISORDER): ICD-10-CM

## 2023-11-28 DIAGNOSIS — F33.0 MILD EPISODE OF RECURRENT MAJOR DEPRESSIVE DISORDER (H): ICD-10-CM

## 2023-11-28 PROCEDURE — 90834 PSYTX W PT 45 MINUTES: CPT | Mod: VID

## 2023-11-28 ASSESSMENT — ANXIETY QUESTIONNAIRES
3. WORRYING TOO MUCH ABOUT DIFFERENT THINGS: NEARLY EVERY DAY
4. TROUBLE RELAXING: NEARLY EVERY DAY
4. TROUBLE RELAXING: NEARLY EVERY DAY
2. NOT BEING ABLE TO STOP OR CONTROL WORRYING: NEARLY EVERY DAY
6. BECOMING EASILY ANNOYED OR IRRITABLE: NEARLY EVERY DAY
IF YOU CHECKED OFF ANY PROBLEMS ON THIS QUESTIONNAIRE, HOW DIFFICULT HAVE THESE PROBLEMS MADE IT FOR YOU TO DO YOUR WORK, TAKE CARE OF THINGS AT HOME, OR GET ALONG WITH OTHER PEOPLE: EXTREMELY DIFFICULT
6. BECOMING EASILY ANNOYED OR IRRITABLE: NEARLY EVERY DAY
5. BEING SO RESTLESS THAT IT IS HARD TO SIT STILL: NEARLY EVERY DAY
IF YOU CHECKED OFF ANY PROBLEMS ON THIS QUESTIONNAIRE, HOW DIFFICULT HAVE THESE PROBLEMS MADE IT FOR YOU TO DO YOUR WORK, TAKE CARE OF THINGS AT HOME, OR GET ALONG WITH OTHER PEOPLE: EXTREMELY DIFFICULT
1. FEELING NERVOUS, ANXIOUS, OR ON EDGE: NEARLY EVERY DAY
2. NOT BEING ABLE TO STOP OR CONTROL WORRYING: NEARLY EVERY DAY
1. FEELING NERVOUS, ANXIOUS, OR ON EDGE: NEARLY EVERY DAY
7. FEELING AFRAID AS IF SOMETHING AWFUL MIGHT HAPPEN: NEARLY EVERY DAY
5. BEING SO RESTLESS THAT IT IS HARD TO SIT STILL: NEARLY EVERY DAY
GAD7 TOTAL SCORE: 21
3. WORRYING TOO MUCH ABOUT DIFFERENT THINGS: NEARLY EVERY DAY
GAD7 TOTAL SCORE: 21
GAD7 TOTAL SCORE: 21
7. FEELING AFRAID AS IF SOMETHING AWFUL MIGHT HAPPEN: NEARLY EVERY DAY
GAD7 TOTAL SCORE: 21
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
## 2023-12-05 ENCOUNTER — VIRTUAL VISIT (OUTPATIENT)
Dept: PSYCHOLOGY | Facility: CLINIC | Age: 48
End: 2023-12-05
Payer: COMMERCIAL

## 2023-12-05 DIAGNOSIS — F41.1 GENERALIZED ANXIETY DISORDER: Primary | ICD-10-CM

## 2023-12-05 DIAGNOSIS — F84.0 AUTISM SPECTRUM DISORDER: ICD-10-CM

## 2023-12-05 DIAGNOSIS — F43.10 PTSD (POST-TRAUMATIC STRESS DISORDER): ICD-10-CM

## 2023-12-05 DIAGNOSIS — F33.0 MILD EPISODE OF RECURRENT MAJOR DEPRESSIVE DISORDER (H): ICD-10-CM

## 2023-12-05 DIAGNOSIS — F60.3 BORDERLINE PERSONALITY DISORDER IN ADULT (H): ICD-10-CM

## 2023-12-05 PROCEDURE — 90834 PSYTX W PT 45 MINUTES: CPT | Mod: VID
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
## 2023-12-12 ENCOUNTER — VIRTUAL VISIT (OUTPATIENT)
Dept: PSYCHOLOGY | Facility: CLINIC | Age: 48
End: 2023-12-12
Payer: COMMERCIAL

## 2023-12-12 DIAGNOSIS — F60.3 BORDERLINE PERSONALITY DISORDER IN ADULT (H): ICD-10-CM

## 2023-12-12 DIAGNOSIS — F41.1 GENERALIZED ANXIETY DISORDER: Primary | ICD-10-CM

## 2023-12-12 DIAGNOSIS — F33.0 MILD EPISODE OF RECURRENT MAJOR DEPRESSIVE DISORDER (H): ICD-10-CM

## 2023-12-12 DIAGNOSIS — F43.10 PTSD (POST-TRAUMATIC STRESS DISORDER): ICD-10-CM

## 2023-12-12 DIAGNOSIS — F84.0 AUTISM SPECTRUM DISORDER: ICD-10-CM

## 2023-12-12 PROCEDURE — 90834 PSYTX W PT 45 MINUTES: CPT | Mod: VID

## 2023-12-12 ASSESSMENT — ANXIETY QUESTIONNAIRES
4. TROUBLE RELAXING: NEARLY EVERY DAY
GAD7 TOTAL SCORE: 17
7. FEELING AFRAID AS IF SOMETHING AWFUL MIGHT HAPPEN: NEARLY EVERY DAY
3. WORRYING TOO MUCH ABOUT DIFFERENT THINGS: MORE THAN HALF THE DAYS
GAD7 TOTAL SCORE: 17
5. BEING SO RESTLESS THAT IT IS HARD TO SIT STILL: SEVERAL DAYS
6. BECOMING EASILY ANNOYED OR IRRITABLE: MORE THAN HALF THE DAYS
2. NOT BEING ABLE TO STOP OR CONTROL WORRYING: NEARLY EVERY DAY
IF YOU CHECKED OFF ANY PROBLEMS ON THIS QUESTIONNAIRE, HOW DIFFICULT HAVE THESE PROBLEMS MADE IT FOR YOU TO DO YOUR WORK, TAKE CARE OF THINGS AT HOME, OR GET ALONG WITH OTHER PEOPLE: EXTREMELY DIFFICULT
1. FEELING NERVOUS, ANXIOUS, OR ON EDGE: NEARLY EVERY DAY
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
## 2023-12-19 ENCOUNTER — VIRTUAL VISIT (OUTPATIENT)
Dept: PSYCHOLOGY | Facility: CLINIC | Age: 48
End: 2023-12-19
Payer: COMMERCIAL

## 2023-12-19 DIAGNOSIS — F43.10 PTSD (POST-TRAUMATIC STRESS DISORDER): ICD-10-CM

## 2023-12-19 DIAGNOSIS — F33.0 MILD EPISODE OF RECURRENT MAJOR DEPRESSIVE DISORDER (H): ICD-10-CM

## 2023-12-19 DIAGNOSIS — F41.1 GENERALIZED ANXIETY DISORDER: Primary | ICD-10-CM

## 2023-12-19 DIAGNOSIS — F60.3 BORDERLINE PERSONALITY DISORDER IN ADULT (H): ICD-10-CM

## 2023-12-19 PROCEDURE — 90834 PSYTX W PT 45 MINUTES: CPT | Mod: VID

## 2023-12-19 NOTE — PROGRESS NOTES
[facility]                                     Progress Note    Patient Name: Taylor Bryan  Date: 23         Service Type: Individual      Session Start Time: 12:00 pm  Session End Time: 12:45 pm     Session Length: 45 minutes    Session #: 37    Attendees: Client attended alone    Service Modality:  Video Visit:      Provider verified identity through the following two step process.  Patient provided:  Patient  and Patient address    Telemedicine Visit: The patient's condition can be safely assessed and treated via synchronous audio and visual telemedicine encounter.      Reason for Telemedicine Visit: Patient has requested telehealth visit    Originating Site (Patient Location): Patient's home    Distant Site (Provider Location): Provider Remote Setting- Home Office    Consent:  The patient/guardian has verbally consented to: the potential risks and benefits of telemedicine (video visit) versus in person care; bill my insurance or make self-payment for services provided; and responsibility for payment of non-covered services.     Patient would like the video invitation sent by: [employer]    Mode of Communication:  Video Conference via [employer] .     As the provider I attest to compliance with applicable laws and regulations related to telemedicine.    DATA  Interactive Complexity: No  Crisis: No        Progress Since Last Session (Related to Symptoms / Goals / Homework):   Symptoms: Improving self awareness interpersonal conflicts self validation    Homework: Completed in session      Episode of Care Goals: Satisfactory progress - MAINTENANCE (Working to maintain change, with risk of relapse); Intervened by continuing to positively reinforce healthy behavior choice      Current / Ongoing Stressors and Concerns:        Client examined feeling detached from reality. Client noted an increase in paranoia and beliefs that do not align with reality. She noted that  having a physical illness can be a trigger for her delusions. Client expressed that typically increasing her dose of Risperdal helps her symptoms. Client expressed that she currently has covid and had to cancel her appointment with her psychiatrist.Client stated that she will reach out to her psychiatrist office and see if they can prescribe despite the inability to be in person. Client stated that she manges her symptoms by utilizing sensory such as her headphones. Client expressed that she will try reading out loud to reduce internal stimuli. Client is transitioning insurance and will explore coverage when she gets her insurance cards replaced.             Treatment Objective(s) Addressed in This Session:  Depression: cognitive strategies  Anxiety:  cognitive strategies     Intervention:   Self exploration and examination  Trauma informed interventions guided imagery - safe container  ACT- Cognitive defiusion   PTSD- Somatic symptoms  IFS- mapping parts  Solution focused; self assessment checklist    Self assessment Red flags:    Physically:    -stop eating healthy foods (high sugar, high cholesterol )   Stop exercising  Stay in the same position for hours  Ignoring one's safety    Self Care:    Not doing one chore per day  Not showering    Emotionally:    Guilt/Shame  Self criticism  Sadness  paranoia    Cognitively:  Excuses/Placating to oneself (lie to self)  Disturbed by others interactions  Not setting boundaries  Performing  Ruminations: themes of rejection, mistakes    Cognitive distortions: Catastrophic thinking (What if)        PHQ9:       7/21/2023     8:00 AM 8/28/2023     2:29 PM 10/3/2023     7:00 AM 11/7/2023     8:59 AM 11/14/2023     8:58 AM 11/21/2023     9:00 AM 11/28/2023     8:48 AM   PHQ-9 SCORE   PHQ-9 Total Score Roman 14 (Moderate depression) 14 (Moderate depression) 8 (Mild depression) 9 (Mild depression) 13 (Moderate depression) 17 (Moderately severe depression) 18 (Moderately severe  depression)   PHQ-9 Total Score 14 14 8 9 13 17 18     GAD7:       12/16/2022    11:14 AM 1/6/2023     1:13 PM 4/14/2023     1:15 PM 4/28/2023     1:31 PM 8/28/2023     2:29 PM 11/28/2023     8:49 AM 12/12/2023     9:03 AM   KADEN-7 SCORE   Total Score 13 (moderate anxiety) 16 (severe anxiety) 20 (severe anxiety) 15 (severe anxiety) 15 (severe anxiety) 21 (severe anxiety) 17 (severe anxiety)   Total Score 13 16 20 15 15 21    21 17     CAGE-AID:       5/26/2020     4:00 PM   CAGE-AID Total Score   Total Score 1     PROMIS 10-Global Health (all questions and answers displayed):       12/16/2022    11:16 AM 1/6/2023     1:15 PM 4/14/2023     1:16 PM 4/28/2023     1:31 PM 8/28/2023     2:30 PM 11/28/2023     8:50 AM 12/12/2023     9:04 AM   PROMIS 10   In general, would you say your health is: Fair Fair Poor Fair Poor Good Fair   In general, would you say your quality of life is: Fair Fair Poor Fair Poor Fair Poor   In general, how would you rate your physical health? Fair Fair Poor Poor Poor Poor Very good   In general, how would you rate your mental health, including your mood and your ability to think? Good Fair Fair Poor Fair Good Very good   In general, how would you rate your satisfaction with your social activities and relationships? Poor Poor Poor Poor Poor Poor Poor   In general, please rate how well you carry out your usual social activities and roles Poor Poor Poor Poor Poor Poor Poor   To what extent are you able to carry out your everyday physical activities such as walking, climbing stairs, carrying groceries, or moving a chair? A little A little A little A little A little Moderately Mostly   In the past 7 days, how often have you been bothered by emotional problems such as feeling anxious, depressed, or irritable? Often Often Always Always Often Often Always   In the past 7 days, how would you rate your fatigue on average? Very severe Mild Very severe Very severe Very severe Severe Mild   In the past 7  days, how would you rate your pain on average, where 0 means no pain, and 10 means worst imaginable pain? 1 6 4 6 5 5 5   In general, would you say your health is: 2 2 1 2 1 3    3 2   In general, would you say your quality of life is: 2 2 1 2 1 2    2 1   In general, how would you rate your physical health? 2 2 1 1 1 1    1 4   In general, how would you rate your mental health, including your mood and your ability to think? 3 2 2 1 2 3    3 4   In general, how would you rate your satisfaction with your social activities and relationships? 1 1 1 1 1 1    1 1   In general, please rate how well you carry out your usual social activities and roles. (This includes activities at home, at work and in your community, and responsibilities as a parent, child, spouse, employee, friend, etc.) 1 1 1 1 1 1    1 1   To what extent are you able to carry out your everyday physical activities such as walking, climbing stairs, carrying groceries, or moving a chair? 2 2 2 2 2 3    3 4   In the past 7 days, how often have you been bothered by emotional problems such as feeling anxious, depressed, or irritable? 4 4 5 5 4 4    4 5   In the past 7 days, how would you rate your fatigue on average? 5 2 5 5 5 4    4 2   In the past 7 days, how would you rate your pain on average, where 0 means no pain, and 10 means worst imaginable pain? 1 6 4 6 5 5    5 5   Global Mental Health Score 8 7 5 5 6 8    8 7   Global Physical Health Score 9 11 7 7 7 9    9 15   PROMIS TOTAL - SUBSCORES 17 18 12 12 13 17    17 22     Straughn Suicide Severity Rating Scale (Lifetime/Recent)      10/17/2022     1:20 PM   Straughn Suicide Severity Rating (Lifetime/Recent)   Q1 Wish to be Dead (Lifetime) N   Q2 Non-Specific Active Suicidal Thoughts (Lifetime) N         ASSESSMENT: Current Emotional / Mental Status (status of significant symptoms):   Risk status (Self / Other harm or suicidal ideation)   Patient denies current fears or concerns for personal  safety.   Patient denies current or recent suicidal ideation or behaviors.   Patient denies current or recent homicidal ideation or behaviors.   Patient denies current or recent self injurious behavior or ideation.   Patient denies other safety concerns.   Patient reports there has been no change in risk factors since their last session.     Patient reports there has been no change in protective factors since their last session.     A safety plan was completed on 10/2020. Reports no current SI. Willing to review plan if SI emerges.     Appearance:   Appropriate    Eye Contact:   Good    Psychomotor Behavior: Normal    Attitude:   Cooperative  Interested   Orientation:   All   Speech    Rate / Production: Normal     Volume:  Normal    Mood:    Normal   Affect:    Appropriate    Thought Content:  Clear    Thought Form:  Goal Directed    Insight:    Good      Medication Review:   No changes to current psychiatric medication(s)     Medication Compliance:   Yes     Changes in Health Issues:   None reported     Chemical Use Review:   Substance Use: Chemical use reviewed, no active concerns identified       Tobacco Use: No current tobacco use.      Diagnosis:  1. Generalized anxiety disorder    2. Bipolar affective disorder, depressed, mild (H)    3. Borderline personality disorder in adult (H)        Collateral Reports Completed:   Not Applicable    PLAN: (Patient Tasks / Therapist Tasks / Other)  There has been demonstrated improvement in functioning while patient has been engaged in psychotherapy/psychological service- if withdrawn the patient would deteriorate and/or relapse.      Client to utilize metaphor Tug a war with a monster to examine control  Client to learn assertive communication skills  Client to utilize ACT (Explore openness and psychological flexibility)  Therapist to provide psychoeducation on communication skills  Therapist to teach ACT concepts, utilize metaphors and assist with application to life  skills.      Mandy Finch, [facility], LADC                                                                ______________________________________________________________________    Individual Treatment Plan    Patient's Name: Taylor Bryan  YOB: 1975    Date of Creation: original, 9/15/2022. Update 12/15/2023  Date Treatment Plan Last Reviewed/Revised: 03/17/2023, revised 09/22/2023 due to scheduling  revised 12/19/2023 due to scheduling    DSM5 Diagnoses:    Generalized anxiety disorder  Borderline Personality Disorder  Alcohol dependence in sustained remission  THC dependence in sustained remission     Psychosocial / Contextual Factors: Patient  lives in own apartment with room mate. Client  has meaningful employment. Finances are stressful however stable.   PROMIS (reviewed every 90 days):     PROMIS-10 Scores        8/28/2023     2:30 PM 11/28/2023     8:50 AM 12/12/2023     9:04 AM   PROMIS-10 Total Score w/o Sub Scores   PROMIS TOTAL - SUBSCORES 13 17    17 22         Referral / Collaboration:  Referral to another professional/service is not indicated at this time..    Anticipated number of session for this episode of care: 9-12 sessions  Anticipation frequency of session: Every other week  Anticipated Duration of each session: 38-52 minutes  Treatment plan will be reviewed in 90 days or when goals have been changed.       MeasurableTreatment Goal(s) related to diagnosis / functional impairment(s)  Goal 1: Patient will decrease KADEN 7 score from a 17 to a 14    I will know I've met my goal when I can communicate respectfully.      Objective #A (Patient Action)    Patient will learn & utilize at least 1 assertive communication skills weekly.  Status: Continued - Date(s): 12/19/2023       Intervention(s)  Therapist will teach  assertive communication vs aggressive communication .    Objective #B  Patient will identify 3 thoughts which contribute to anger or irritation.  Status: Continued - Date(s):  12/19/2023       Intervention(s)  Therapist will assign homework [place] learned concepts of ACT .    Objective #C  Patient will use at least 3 coping skills for anxiety management in the next 3 weeks.  Status: Continued - Date(s): 12/19/2023    Intervention(s)  Therapist will assign homework identifing learned coping skills .            Mandy Finch, [facility], [facility]  December 19 2023

## 2024-04-04 ENCOUNTER — TRANSCRIBE ORDERS (OUTPATIENT)
Dept: OTHER | Age: 49
End: 2024-04-04

## 2024-04-04 DIAGNOSIS — R51.9 CHRONIC NONINTRACTABLE HEADACHE, UNSPECIFIED HEADACHE TYPE: Primary | ICD-10-CM

## 2024-04-04 DIAGNOSIS — G89.29 CHRONIC NONINTRACTABLE HEADACHE, UNSPECIFIED HEADACHE TYPE: Primary | ICD-10-CM

## 2024-04-04 DIAGNOSIS — R42 DIZZINESS: ICD-10-CM

## 2024-07-27 ENCOUNTER — HEALTH MAINTENANCE LETTER (OUTPATIENT)
Age: 49
End: 2024-07-27

## 2025-08-10 ENCOUNTER — HEALTH MAINTENANCE LETTER (OUTPATIENT)
Age: 50
End: 2025-08-10